# Patient Record
Sex: FEMALE | Race: WHITE | NOT HISPANIC OR LATINO | Employment: FULL TIME | URBAN - METROPOLITAN AREA
[De-identification: names, ages, dates, MRNs, and addresses within clinical notes are randomized per-mention and may not be internally consistent; named-entity substitution may affect disease eponyms.]

---

## 2017-02-15 ENCOUNTER — APPOINTMENT (OUTPATIENT)
Dept: NEUROLOGY | Facility: MEDICAL CENTER | Age: 22
End: 2017-02-15
Payer: COMMERCIAL

## 2017-04-19 ENCOUNTER — OFFICE VISIT (OUTPATIENT)
Dept: NEUROLOGY | Facility: MEDICAL CENTER | Age: 22
End: 2017-04-19
Payer: COMMERCIAL

## 2017-04-19 VITALS
OXYGEN SATURATION: 97 % | HEIGHT: 63 IN | SYSTOLIC BLOOD PRESSURE: 104 MMHG | HEART RATE: 64 BPM | TEMPERATURE: 98.9 F | DIASTOLIC BLOOD PRESSURE: 62 MMHG | BODY MASS INDEX: 21.79 KG/M2 | WEIGHT: 123 LBS | RESPIRATION RATE: 16 BRPM

## 2017-04-19 DIAGNOSIS — G43.019 INTRACTABLE MIGRAINE WITHOUT AURA AND WITHOUT STATUS MIGRAINOSUS: ICD-10-CM

## 2017-04-19 PROCEDURE — 99213 OFFICE O/P EST LOW 20 MIN: CPT | Performed by: PSYCHIATRY & NEUROLOGY

## 2017-04-19 RX ORDER — SUMATRIPTAN AND NAPROXEN SODIUM 85; 500 MG/1; MG/1
TABLET, FILM COATED ORAL
Qty: 9 TAB | Refills: 6 | Status: SHIPPED | OUTPATIENT
Start: 2017-04-19 | End: 2018-05-17

## 2017-04-19 RX ORDER — PROPRANOLOL HCL 60 MG
120 CAPSULE, EXTENDED RELEASE 24HR ORAL DAILY
Qty: 60 CAP | Refills: 6 | Status: SHIPPED | OUTPATIENT
Start: 2017-04-19 | End: 2018-05-17 | Stop reason: SDUPTHER

## 2017-04-19 RX ORDER — PROPRANOLOL HYDROCHLORIDE 10 MG/1
10 TABLET ORAL 3 TIMES DAILY
COMMUNITY
End: 2017-04-19 | Stop reason: CLARIF

## 2017-04-19 ASSESSMENT — ENCOUNTER SYMPTOMS
LOSS OF CONSCIOUSNESS: 0
NAUSEA: 1
HEADACHES: 1
DEPRESSION: 0
MEMORY LOSS: 0

## 2017-04-19 NOTE — MR AVS SNAPSHOT
"        Brenda Priest McUbaldo   2017 4:40 PM   Office Visit   MRN: [id number]    Department:  [facility]   Dept Phone:  [phone]    Description:  Female : 1995   Provider:  Juni Velásquez M.D.           Reason for Visit     Follow-Up Migraines      Allergies as of 2017     Allergen Noted Reactions    Ciprofloxacin 2013   Vomiting    Codeine 10/17/2011   Hives    Doxycycline 2012   Itching, Vomiting    Keflex 2012   Itching      You were diagnosed with     Intractable migraine without aura and without status migrainosus   [[id number]]         Vital Signs     Blood Pressure Pulse Temperature Respirations Height Weight    104/62 mmHg 64 37.2 °C (98.9 °F) 16 1.6 m (5' 3\") 55.792 kg (123 lb)    Body Mass Index Oxygen Saturation Smoking Status             21.79 kg/m2 97% Never Smoker          Basic Information     Date Of Birth Sex Race Ethnicity Preferred Language    1995 Female White Non- English      Problem List              ICD-10-CM Priority Class Noted - Resolved    Asthma, exercise induced J45.990   Unknown - Present    Chronic cholecystitis K81.1   3/12/2015 - Present    Intractable migraine without aura and without status migrainosus G43.019   2017 - Present      Health Maintenance        Date Due Completion Dates    IMM HEP B VACCINE (1 of 3 - Primary Series) 1994 ---    IMM HEP A VACCINE (1 of 2 - Standard Series) 1995 ---    IMM HPV VACCINE (1 of 3 - Female 3 Dose Series) 2005 ---    IMM VARICELLA (CHICKENPOX) VACCINE (1 of 2 - 2 Dose Adolescent Series) 2007 ---    IMM DTaP/Tdap/Td Vaccine (1 - Tdap) 2013 ---    PAP SMEAR 2015 ---            Current Immunizations     MMR/Varicella Combined Vaccine  Incomplete    Tdap Vaccine  Incomplete      Below and/or attached are the medications your provider expects you to take. Review all of your home medications and newly ordered medications with your provider and/or " pharmacist. Follow medication instructions as directed by your provider and/or pharmacist. Please keep your medication list with you and share with your provider. Update the information when medications are discontinued, doses are changed, or new medications (including over-the-counter products) are added; and carry medication information at all times in the event of emergency situations     Allergies:  CIPROFLOXACIN - Vomiting     CODEINE - Hives     DOXYCYCLINE - Itching,Vomiting     KEFLEX - Itching               Medications  Valid as of: April 19, 2017 -  5:10 PM    Generic Name Brand Name Tablet Size Instructions for use    Hydrocodone-Acetaminophen (Tab) NORCO 5-325 MG Take 1-2 Tabs by mouth every 6 hours as needed (pain).        Nitrofurantoin Monohyd Macro (Cap) MACROBID 100 MG Take 1 Cap by mouth 2 times a day.        NON SPECIFIED   Birth control begins w 'z'        Ondansetron (TABLET DISPERSIBLE) ZOFRAN ODT 4 MG Take 1 Tab by mouth every 8 hours as needed.        Propranolol HCl (CAPSULE SR 24 HR) INDERAL LA 60 MG Take 2 Caps by mouth every day.        Sertraline HCl (Tab) ZOLOFT 100 MG Take 100 mg by mouth every day.        Sumatriptan-Naproxen Sodium (Tab) Sumatriptan-Naproxen Sodium  MG 1 tab at headache onset; repeat in 1 hour prn        .                 Medicines prescribed today were sent to:     [facility] [address] [place] [address] AT [address] [address]    Phone: [phone] Fax: [phone]    Open 24 Hours?: No      Medication refill instructions:       If your prescription bottle indicates you have medication refills left, it is not necessary to call your provider’s office. Please contact your pharmacy and they will refill your medication.    If your prescription bottle indicates you do not have any refills left, you may request refills at any time through one of the following ways: The online [employer] system  (except Urgent Care), by calling your provider’s office, or by asking your pharmacy to contact your provider’s office with a refill request. Medication refills are processed only during regular business hours and may not be available until the next business day. Your provider may request additional information or to have a follow-up visit with you prior to refilling your medication.   *Please Note: Medication refills are assigned a new Rx number when refilled electronically. Your pharmacy may indicate that no refills were authorized even though a new prescription for the same medication is available at the pharmacy. Please request the medicine by name with the pharmacy before contacting your provider for a refill.           [employer] Access Code: Activation code not generated  Current Identiv Status: Active

## 2017-04-20 NOTE — PROGRESS NOTES
"Subjective:      Brenda Westbrook is a 22 y.o. female who presents for follow-up with a history of intractable migraine without aura.     HPI    Since last seen, she states that the headaches had responded nicely to Inderal LA 60 mg daily, this after she failed a trial of Zonegran. She was tolerating the drug well. Over the last month or 2, she began to notice the headaches were occurring, not necessarily more often, still with a 5-6 day duration and on a monthly basis, it is simply the intensity has increased noticeably. They start over and around the left eye, now moving over the central forehead. The intensity and associated symptoms are more prominent. Treximet was being used with good benefit, she did not fill her prescription, now uses Motrin only. Needless to say, she is suffering for days at a time until the headache spontaneously remits.    Medical, surgical and family history is reviewed, there are no known drug allergies. She is on Inderal LA 60 mg daily, has Treximet to use available, Motrin 600 mg when necessary, Zofran ODT 4 mg when necessary, and she has stopped the Zoloft for the postpartum depression.    Review of Systems   Constitutional: Positive for malaise/fatigue.   Gastrointestinal: Positive for nausea.   Neurological: Positive for headaches. Negative for loss of consciousness.   Psychiatric/Behavioral: Negative for depression and memory loss.   All other systems reviewed and are negative.       Objective:     /62 mmHg  Pulse 64  Temp(Src) 37.2 °C (98.9 °F)  Resp 16  Ht 1.6 m (5' 3\")  Wt 55.792 kg (123 lb)  BMI 21.79 kg/m2  SpO2 97%     Physical Exam    She appears in no acute distress. Well-developed and well-nourished, she is cooperative. Vital signs are stable. There is some tenderness over the left supraorbital ridge and forehead, mild tenderness bilaterally over the cervical paraspinal muscle bodies. Range of motion of the cervical spine is full. Carotid pulses are " present without asymmetry. Cardiac evaluation is unremarkable. Including mental status, cranial nerves, motor, reflex, coordination, and sensory evaluations, the complete neurologic examination was done and reveals no evidence of deficits or toxicities.     Assessment/Plan:     1. Intractable migraine without aura and without status migrainosus  I will increase Inderal LA to 120 mg daily. I suspect a dose response will still be seen, but why the headaches have simply increased in severity is still a question. Beta blockers can lose efficacy/develop tachyphylaxis, hopefully this is not the case. Treximet will be renewed as well, she was told to stop low-dose ibuprofen. Follow-up in about 4-5 months otherwise, phone contact in the interim if the Inderal is not well tolerated (side effects were reviewed), or if there is no additional benefit.    - propranolol LA (INDERAL LA) 60 MG CAPSULE SR 24 HR; Take 2 Caps by mouth every day.  Dispense: 60 Cap; Refill: 6  - Sumatriptan-Naproxen Sodium (TREXIMET)  MG Tab; 1 tab at headache onset; repeat in 1 hour prn  Dispense: 9 Tab; Refill: 6    Time: Evaluation 20 minutes for exam, review, discussion, and education  Discussion: As mentioned in the assessment, over 50% of time spent face-to-face counseling and coordinating care

## 2017-05-10 ENCOUNTER — APPOINTMENT (OUTPATIENT)
Dept: RADIOLOGY | Facility: MEDICAL CENTER | Age: 22
End: 2017-05-10
Attending: EMERGENCY MEDICINE
Payer: COMMERCIAL

## 2017-05-10 ENCOUNTER — HOSPITAL ENCOUNTER (EMERGENCY)
Facility: MEDICAL CENTER | Age: 22
End: 2017-05-10
Attending: EMERGENCY MEDICINE
Payer: COMMERCIAL

## 2017-05-10 VITALS
BODY MASS INDEX: 22.68 KG/M2 | RESPIRATION RATE: 16 BRPM | SYSTOLIC BLOOD PRESSURE: 129 MMHG | HEIGHT: 63 IN | OXYGEN SATURATION: 100 % | TEMPERATURE: 98.9 F | HEART RATE: 71 BPM | WEIGHT: 128 LBS | DIASTOLIC BLOOD PRESSURE: 72 MMHG

## 2017-05-10 DIAGNOSIS — S46.912A SHOULDER STRAIN, LEFT, INITIAL ENCOUNTER: ICD-10-CM

## 2017-05-10 PROCEDURE — 99284 EMERGENCY DEPT VISIT MOD MDM: CPT

## 2017-05-10 PROCEDURE — 307740 HCHG GREEN TRAUMA TEAM SERVICES

## 2017-05-10 PROCEDURE — 73030 X-RAY EXAM OF SHOULDER: CPT | Mod: LT

## 2017-05-10 PROCEDURE — 72040 X-RAY EXAM NECK SPINE 2-3 VW: CPT
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
## 2017-05-10 NOTE — ED AVS SNAPSHOT
5/10/2017    Brenda Gaviria  [address]  [facility] [address]    Dear Brenda:    [facility] wants to ensure your discharge home is safe and you or your loved ones have had all of your questions answered regarding your care after you leave the hospital.    Below is a list of resources and contact information should you have any questions regarding your hospital stay, follow-up instructions, or active medical symptoms.    Questions or Concerns Regarding… Contact   Medical Questions Related to Your Discharge  (7 days a week, 8am-5pm) Contact a Nurse Care Coordinator   [phone]   Medical Questions Not Related to Your Discharge  (24 hours a day / 7 days a week)  Contact the Nurse Health Line   [phone]    Medications or Discharge Instructions Refer to your discharge packet   or contact your [facility] Primary Care Provider   [phone]   Follow-up Appointment(s) Schedule your appointment via edenes   or contact Scheduling [phone]   Billing Review your statement via edenes  or contact Billing [phone]   Medical Records Review your records via edenes   or contact Medical Records [phone]     You may receive a telephone call within two days of discharge. This call is to make certain you understand your discharge instructions and have the opportunity to have any questions answered. You can also easily access your medical information, test results and upcoming appointments via the edenes free online health management tool. You can learn more and sign up at [employer]/edenes. For assistance setting up your edenes account, please call [phone].    Once again, we want to ensure your discharge home is safe and that you have a clear understanding of any next steps in your care. If you have any questions or concerns, please do not hesitate to contact us, we are here for you. Thank you for choosing [facility] for your healthcare needs.    Sincerely,    Your [facility] Healthcare Team

## 2017-05-10 NOTE — ED AVS SNAPSHOT
Weimob Access Code: [email or web address]  Expires: 6/9/2017 12:04 PM    Weimob  A secure, online tool to manage your health information     [employer]’s Weimob® is a secure, online tool that connects you to your personalized health information from the privacy of your home -- day or night - making it very easy for you to manage your healthcare. Once the activation process is completed, you can even access your medical information using the Weimob lydia, which is available for free in the Apple Lydia store or Google Play store.     Weimob provides the following levels of access (as shown below):   My Chart Features   [facility] Primary Care Doctor [facility]  Specialists [facility]  Urgent  Care Non-[facility]  Primary Care  Doctor   Email your healthcare team securely and privately 24/7 X X X X   Manage appointments: schedule your next appointment; view details of past/upcoming appointments X      Request prescription refills. X      View recent personal medical records, including lab and immunizations X X X X   View health record, including health history, allergies, medications X X X X   Read reports about your outpatient visits, procedures, consult and ER notes X X X X   See your discharge summary, which is a recap of your hospital and/or ER visit that includes your diagnosis, lab results, and care plan. X X       How to register for Weimob:  1. Go to  [email or web address].  2. Click on the Sign Up Now box, which takes you to the New Member Sign Up page. You will need to provide the following information:  a. Enter your Weimob Access Code exactly as it appears at the top of this page. (You will not need to use this code after you’ve completed the sign-up process. If you do not sign up before the expiration date, you must request a new code.)   b. Enter your date of birth.   c. Enter your home email address.   d. Click Submit, and follow the next screen’s instructions.  3. Create a Weimob ID. This will be your Weimob  login ID and cannot be changed, so think of one that is secure and easy to remember.  4. Create a [employer] password. You can change your password at any time.  5. Enter your Password Reset Question and Answer. This can be used at a later time if you forget your password.   6. Enter your e-mail address. This allows you to receive e-mail notifications when new information is available in GamePix.  7. Click Sign Up. You can now view your health information.    For assistance activating your GamePix account, call [phone]
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
## 2017-05-10 NOTE — DISCHARGE INSTRUCTIONS
Shoulder Pain  The shoulder is the joint that connects your arm to your body. Muscles and band-like tissues that connect bones to muscles (tendons) hold the joint together. Shoulder pain is felt if an injury or medical problem affects one or more parts of the shoulder.  HOME CARE   · Put ice on the sore area.  ¨ Put ice in a plastic bag.  ¨ Place a towel between your skin and the bag.  ¨ Leave the ice on for 15-20 minutes, 03-04 times a day for the first 2 days.  · Stop using cold packs if they do not help with the pain.  · If you were given something to keep your shoulder from moving (sling; shoulder immobilizer), wear it as told. Only take it off to shower or bathe.  · Move your arm as little as possible, but keep your hand moving to prevent puffiness (swelling).  · Squeeze a soft ball or foam pad as much as possible to help prevent swelling.  · Take medicine as told by your doctor.  GET HELP IF:  · You have progressing new pain in your arm, hand, or fingers.  · Your hand or fingers get cold.  · Your medicine does not help lessen your pain.  GET HELP RIGHT AWAY IF:   · Your arm, hand, or fingers are numb or tingling.  · Your arm, hand, or fingers are puffy (swollen), painful, or turn white or blue.  MAKE SURE YOU:   · Understand these instructions.  · Will watch your condition.  · Will get help right away if you are not doing well or get worse.     This information is not intended to replace advice given to you by your health care provider. Make sure you discuss any questions you have with your health care provider.     Document Released: 06/05/2009 Document Revised: 01/08/2016 Document Reviewed: 04/11/2016  [employer] Interactive Patient Education ©2016 [employer]

## 2017-05-10 NOTE — ED AVS SNAPSHOT
Home Care Instructions                                                                                                                Brenda Gaviria   MRN: [id number]    Department:  [facility], Emergency Dept   Date of Visit:  5/10/2017            [facility], Emergency Dept    [address]    [place] [address]    Phone:  [phone]      You were seen by     Arslan Dumas M.D.      Your Diagnosis Was     Shoulder strain, left, initial encounter     S46.912A       Follow-up Information     1. Follow up with your primary care doctor In 2 weeks.    Why:  As needed      Medication Information     Review all of your home medications and newly ordered medications with your primary doctor and/or pharmacist as soon as possible. Follow medication instructions as directed by your doctor and/or pharmacist.     Please keep your complete medication list with you and share with your physician. Update the information when medications are discontinued, doses are changed, or new medications (including over-the-counter products) are added; and carry medication information at all times in the event of emergency situations.               Medication List      Notice     You have not been prescribed any medications.            Procedures and tests performed during your visit     DX-CERVICAL SPINE-2 OR 3 VIEWS    DX-SHOULDER 2+ LEFT    SLING        Discharge Instructions       Shoulder Pain  The shoulder is the joint that connects your arm to your body. Muscles and band-like tissues that connect bones to muscles (tendons) hold the joint together. Shoulder pain is felt if an injury or medical problem affects one or more parts of the shoulder.  HOME CARE   · Put ice on the sore area.  ¨ Put ice in a plastic bag.  ¨ Place a towel between your skin and the bag.  ¨ Leave the ice on for 15-20 minutes, 03-04 times a day for the first 2 days.  · Stop using cold packs if they do not help with the  pain.  · If you were given something to keep your shoulder from moving (sling; shoulder immobilizer), wear it as told. Only take it off to shower or bathe.  · Move your arm as little as possible, but keep your hand moving to prevent puffiness (swelling).  · Squeeze a soft ball or foam pad as much as possible to help prevent swelling.  · Take medicine as told by your doctor.  GET HELP IF:  · You have progressing new pain in your arm, hand, or fingers.  · Your hand or fingers get cold.  · Your medicine does not help lessen your pain.  GET HELP RIGHT AWAY IF:   · Your arm, hand, or fingers are numb or tingling.  · Your arm, hand, or fingers are puffy (swollen), painful, or turn white or blue.  MAKE SURE YOU:   · Understand these instructions.  · Will watch your condition.  · Will get help right away if you are not doing well or get worse.     This information is not intended to replace advice given to you by your health care provider. Make sure you discuss any questions you have with your health care provider.     Document Released: 06/05/2009 Document Revised: 01/08/2016 Document Reviewed: 04/11/2016  "[employer] Interactive Patient Education ©2016 "[employer] Inc.            Patient Information     Patient Information    Following emergency treatment: all patient requiring follow-up care must return either to a private physician or a clinic if your condition worsens before you are able to obtain further medical attention, please return to the emergency room.     Billing Information    At [facility], we work to make the billing process streamlined for our patients.  Our Representatives are here to answer any questions you may have regarding your hospital bill.  If you have insurance coverage and have supplied your insurance information to us, we will submit a claim to your insurer on your behalf.  Should you have any questions regarding your bill, we can be reached online or by phone as follows:  Online: You are able pay  your bills online or live chat with our representatives about any billing questions you may have. We are here to help Monday - Friday from 8:00am to 7:30pm and 9:00am - 12:00pm on Saturdays.  Please visit [email or web address]  for more information.   Phone:  [phone] or [phone]    Please note that your emergency physician, surgeon, pathologist, radiologist, anesthesiologist, and other specialists are not employed by [facility] and will therefore bill separately for their services.  Please contact them directly for any questions concerning their bills at the numbers below:     Emergency Physician Services:  [phone]  [facility]:  [phone]  [facility]:  [phone]  [facility]:  [phone]    1. Your final bill may vary from the amount quoted upon discharge if all procedures are not complete at that time, or if your doctor has additional procedures of which we are not aware. You will receive an additional bill if you return to the Emergency Department at [facility] for suture removal regardless of the facility of which the sutures were placed.     2. Please arrange for settlement of this account at the emergency registration.    3. All self-pay accounts are due in full at the time of treatment.  If you are unable to meet this obligation then payment is expected within 4-5 days.     4. If you have had radiology studies (CT, X-ray, Ultrasound, MRI), you have received a preliminary result during your emergency department visit. Please contact the radiology department [phone] to receive a copy of your final result. Please discuss the Final result with your primary physician or with the follow up physician provided.     Crisis Hotline:  [place] Crisis Hotline:  9-187-KVDJRAZ or 1-797.432.6345  [place] Crisis Hotline:    1-364.694.1822 or 765-669-0410         ED Discharge Follow Up Questions    1. In order to  provide you with very good care, we would like to follow up with a phone call in the next few days.  May we have your permission to contact you?     YES /  NO    2. What is the best phone number to call you? (       )_____-__________    3. What is the best time to call you?      Morning  /  Afternoon  /  Evening                   Patient Signature:  ____________________________________________________________    Date:  ____________________________________________________________

## 2017-07-19 ENCOUNTER — APPOINTMENT (OUTPATIENT)
Dept: PHYSICAL THERAPY | Facility: REHABILITATION | Age: 22
End: 2017-07-19
Attending: FAMILY MEDICINE
Payer: COMMERCIAL

## 2017-07-24 ENCOUNTER — APPOINTMENT (OUTPATIENT)
Dept: PHYSICAL THERAPY | Facility: REHABILITATION | Age: 22
End: 2017-07-24
Attending: FAMILY MEDICINE
Payer: COMMERCIAL

## 2017-07-26 ENCOUNTER — APPOINTMENT (OUTPATIENT)
Dept: PHYSICAL THERAPY | Facility: REHABILITATION | Age: 22
End: 2017-07-26
Attending: FAMILY MEDICINE
Payer: COMMERCIAL

## 2017-07-27 ENCOUNTER — APPOINTMENT (OUTPATIENT)
Dept: PHYSICAL THERAPY | Facility: REHABILITATION | Age: 22
End: 2017-07-27
Attending: FAMILY MEDICINE
Payer: COMMERCIAL

## 2017-07-31 ENCOUNTER — APPOINTMENT (OUTPATIENT)
Dept: PHYSICAL THERAPY | Facility: REHABILITATION | Age: 22
End: 2017-07-31
Attending: FAMILY MEDICINE
Payer: COMMERCIAL

## 2017-08-01 ENCOUNTER — APPOINTMENT (OUTPATIENT)
Dept: PHYSICAL THERAPY | Facility: REHABILITATION | Age: 22
End: 2017-08-01
Attending: FAMILY MEDICINE
Payer: COMMERCIAL

## 2017-08-02 ENCOUNTER — APPOINTMENT (OUTPATIENT)
Dept: PHYSICAL THERAPY | Facility: REHABILITATION | Age: 22
End: 2017-08-02
Attending: FAMILY MEDICINE
Payer: COMMERCIAL

## 2017-08-03 ENCOUNTER — APPOINTMENT (OUTPATIENT)
Dept: PHYSICAL THERAPY | Facility: REHABILITATION | Age: 22
End: 2017-08-03
Attending: FAMILY MEDICINE
Payer: COMMERCIAL

## 2017-08-07 ENCOUNTER — APPOINTMENT (OUTPATIENT)
Dept: PHYSICAL THERAPY | Facility: REHABILITATION | Age: 22
End: 2017-08-07
Attending: FAMILY MEDICINE
Payer: COMMERCIAL

## 2017-08-08 ENCOUNTER — APPOINTMENT (OUTPATIENT)
Dept: PHYSICAL THERAPY | Facility: REHABILITATION | Age: 22
End: 2017-08-08
Attending: FAMILY MEDICINE
Payer: COMMERCIAL

## 2017-08-09 ENCOUNTER — APPOINTMENT (OUTPATIENT)
Dept: PHYSICAL THERAPY | Facility: REHABILITATION | Age: 22
End: 2017-08-09
Attending: FAMILY MEDICINE
Payer: COMMERCIAL

## 2017-08-14 ENCOUNTER — APPOINTMENT (OUTPATIENT)
Dept: PHYSICAL THERAPY | Facility: REHABILITATION | Age: 22
End: 2017-08-14
Attending: FAMILY MEDICINE
Payer: COMMERCIAL

## 2017-08-22 ENCOUNTER — APPOINTMENT (OUTPATIENT)
Dept: NEUROLOGY | Facility: MEDICAL CENTER | Age: 22
End: 2017-08-22
Payer: COMMERCIAL

## 2017-11-30 ENCOUNTER — APPOINTMENT (OUTPATIENT)
Dept: RADIOLOGY | Facility: MEDICAL CENTER | Age: 22
End: 2017-11-30
Attending: EMERGENCY MEDICINE
Payer: COMMERCIAL

## 2017-11-30 ENCOUNTER — HOSPITAL ENCOUNTER (EMERGENCY)
Facility: MEDICAL CENTER | Age: 22
End: 2017-11-30
Attending: EMERGENCY MEDICINE
Payer: COMMERCIAL

## 2017-11-30 VITALS
HEIGHT: 63 IN | TEMPERATURE: 98.3 F | DIASTOLIC BLOOD PRESSURE: 74 MMHG | BODY MASS INDEX: 22.32 KG/M2 | HEART RATE: 100 BPM | RESPIRATION RATE: 16 BRPM | OXYGEN SATURATION: 99 % | WEIGHT: 126 LBS | SYSTOLIC BLOOD PRESSURE: 136 MMHG

## 2017-11-30 DIAGNOSIS — S16.1XXA STRAIN OF NECK MUSCLE, INITIAL ENCOUNTER: ICD-10-CM

## 2017-11-30 DIAGNOSIS — M79.604 RIGHT LEG PAIN: ICD-10-CM
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
## 2017-11-30 LAB — HCG UR QL: NEGATIVE

## 2017-11-30 PROCEDURE — 72125 CT NECK SPINE W/O DYE: CPT

## 2017-11-30 PROCEDURE — 700102 HCHG RX REV CODE 250 W/ 637 OVERRIDE(OP): Performed by: EMERGENCY MEDICINE

## 2017-11-30 PROCEDURE — 73562 X-RAY EXAM OF KNEE 3: CPT | Mod: RT

## 2017-11-30 PROCEDURE — 81025 URINE PREGNANCY TEST: CPT

## 2017-11-30 PROCEDURE — 99284 EMERGENCY DEPT VISIT MOD MDM: CPT

## 2017-11-30 PROCEDURE — A9270 NON-COVERED ITEM OR SERVICE: HCPCS | Performed by: EMERGENCY MEDICINE

## 2017-11-30 RX ORDER — ACETAMINOPHEN 325 MG/1
650 TABLET ORAL ONCE
Status: COMPLETED | OUTPATIENT
Start: 2017-11-30 | End: 2017-11-30

## 2017-11-30 RX ORDER — HYDROCODONE BITARTRATE AND ACETAMINOPHEN 5; 325 MG/1; MG/1
1-2 TABLET ORAL EVERY 6 HOURS PRN
Qty: 15 TAB | Refills: 0 | Status: SHIPPED | OUTPATIENT
Start: 2017-11-30 | End: 2018-03-08

## 2017-11-30 RX ADMIN — ACETAMINOPHEN 650 MG: 325 TABLET, FILM COATED ORAL at 10:10

## 2017-11-30 ASSESSMENT — ENCOUNTER SYMPTOMS
NECK PAIN: 1
LOSS OF CONSCIOUSNESS: 0

## 2017-11-30 ASSESSMENT — PAIN SCALES - GENERAL: PAINLEVEL_OUTOF10: 8
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
## 2017-11-30 NOTE — ED NOTES
Patient given d/c instructions, crutches, and prescription. Verbalized understanding. AAOx4, VSS d/c'd home.

## 2017-11-30 NOTE — DISCHARGE INSTRUCTIONS
Cervical Sprain  Return to the ER if any worsening of your pain. Contact her doctor for follow-up next week  A cervical sprain is an injury in the neck in which the strong, fibrous tissues (ligaments) that connect your neck bones stretch or tear. Cervical sprains can range from mild to severe. Severe cervical sprains can cause the neck vertebrae to be unstable. This can lead to damage of the spinal cord and can result in serious nervous system problems. The amount of time it takes for a cervical sprain to get better depends on the cause and extent of the injury. Most cervical sprains heal in 1 to 3 weeks.  CAUSES   Severe cervical sprains may be caused by:   · Contact sport injuries (such as from football, rugby, wrestling, hockey, auto racing, gymnastics, diving, martial arts, or boxing).    · Motor vehicle collisions.    · Whiplash injuries. This is an injury from a sudden forward and backward whipping movement of the head and neck.   · Falls.    Mild cervical sprains may be caused by:   · Being in an awkward position, such as while cradling a telephone between your ear and shoulder.    · Sitting in a chair that does not offer proper support.    · Working at a poorly designed computer station.    · Looking up or down for long periods of time.    SYMPTOMS   · Pain, soreness, stiffness, or a burning sensation in the front, back, or sides of the neck. This discomfort may develop immediately after the injury or slowly, 24 hours or more after the injury.    · Pain or tenderness directly in the middle of the back of the neck.    · Shoulder or upper back pain.    · Limited ability to move the neck.    · Headache.    · Dizziness.    · Weakness, numbness, or tingling in the hands or arms.    · Muscle spasms.    · Difficulty swallowing or chewing.    · Tenderness and swelling of the neck.    DIAGNOSIS   Most of the time your health care provider can diagnose a cervical sprain by taking your history and doing a physical  exam. Your health care provider will ask about previous neck injuries and any known neck problems, such as arthritis in the neck. X-rays may be taken to find out if there are any other problems, such as with the bones of the neck. Other tests, such as a CT scan or MRI, may also be needed.   TREATMENT   Treatment depends on the severity of the cervical sprain. Mild sprains can be treated with rest, keeping the neck in place (immobilization), and pain medicines. Severe cervical sprains are immediately immobilized. Further treatment is done to help with pain, muscle spasms, and other symptoms and may include:  · Medicines, such as pain relievers, numbing medicines, or muscle relaxants.    · Physical therapy. This may involve stretching exercises, strengthening exercises, and posture training. Exercises and improved posture can help stabilize the neck, strengthen muscles, and help stop symptoms from returning.    HOME CARE INSTRUCTIONS   · Put ice on the injured area.    ¨ Put ice in a plastic bag.    ¨ Place a towel between your skin and the bag.    ¨ Leave the ice on for 15-20 minutes, 3-4 times a day.    · If your injury was severe, you may have been given a cervical collar to wear. A cervical collar is a two-piece collar designed to keep your neck from moving while it heals.  ¨ Do not remove the collar unless instructed by your health care provider.  ¨ If you have long hair, keep it outside of the collar.  ¨ Ask your health care provider before making any adjustments to your collar. Minor adjustments may be required over time to improve comfort and reduce pressure on your chin or on the back of your head.  ¨ If you are allowed to remove the collar for cleaning or bathing, follow your health care provider's instructions on how to do so safely.  ¨ Keep your collar clean by wiping it with mild soap and water and drying it completely. If the collar you have been given includes removable pads, remove them every 1-2 days  and hand wash them with soap and water. Allow them to air dry. They should be completely dry before you wear them in the collar.  ¨ If you are allowed to remove the collar for cleaning and bathing, wash and dry the skin of your neck. Check your skin for irritation or sores. If you see any, tell your health care provider.  ¨ Do not drive while wearing the collar.    · Only take over-the-counter or prescription medicines for pain, discomfort, or fever as directed by your health care provider.    · Keep all follow-up appointments as directed by your health care provider.    · Keep all physical therapy appointments as directed by your health care provider.    · Make any needed adjustments to your workstation to promote good posture.    · Avoid positions and activities that make your symptoms worse.    · Warm up and stretch before being active to help prevent problems.    SEEK MEDICAL CARE IF:   · Your pain is not controlled with medicine.    · You are unable to decrease your pain medicine over time as planned.    · Your activity level is not improving as expected.    SEEK IMMEDIATE MEDICAL CARE IF:   · You develop any bleeding.  · You develop stomach upset.  · You have signs of an allergic reaction to your medicine.    · Your symptoms get worse.    · You develop new, unexplained symptoms.    · You have numbness, tingling, weakness, or paralysis in any part of your body.    MAKE SURE YOU:   · Understand these instructions.  · Will watch your condition.  · Will get help right away if you are not doing well or get worse.     This information is not intended to replace advice given to you by your health care provider. Make sure you discuss any questions you have with your health care provider.     Document Released: 10/14/2008 Document Revised: 12/23/2014 Document Reviewed: 06/25/2014  [employer] Interactive Patient Education ©2016 [employer]  Motor Vehicle Collision  It is common to have multiple bruises and sore muscles  after a motor vehicle collision (MVC). These tend to feel worse for the first 24 hours. You may have the most stiffness and soreness over the first several hours. You may also feel worse when you wake up the first morning after your collision. After this point, you will usually begin to improve with each day. The speed of improvement often depends on the severity of the collision, the number of injuries, and the location and nature of these injuries.  HOME CARE INSTRUCTIONS  · Put ice on the injured area.  ¨ Put ice in a plastic bag.  ¨ Place a towel between your skin and the bag.  ¨ Leave the ice on for 15-20 minutes, 3-4 times a day, or as directed by your health care provider.  · Drink enough fluids to keep your urine clear or pale yellow. Do not drink alcohol.  · Take a warm shower or bath once or twice a day. This will increase blood flow to sore muscles.  · You may return to activities as directed by your caregiver. Be careful when lifting, as this may aggravate neck or back pain.  · Only take over-the-counter or prescription medicines for pain, discomfort, or fever as directed by your caregiver. Do not use aspirin. This may increase bruising and bleeding.  SEEK IMMEDIATE MEDICAL CARE IF:  · You have numbness, tingling, or weakness in the arms or legs.  · You develop severe headaches not relieved with medicine.  · You have severe neck pain, especially tenderness in the middle of the back of your neck.  · You have changes in bowel or bladder control.  · There is increasing pain in any area of the body.  · You have shortness of breath, light-headedness, dizziness, or fainting.  · You have chest pain.  · You feel sick to your stomach (nauseous), throw up (vomit), or sweat.  · You have increasing abdominal discomfort.  · There is blood in your urine, stool, or vomit.  · You have pain in your shoulder (shoulder strap areas).  · You feel your symptoms are getting worse.  MAKE SURE YOU:  · Understand these  instructions.  · Will watch your condition.  · Will get help right away if you are not doing well or get worse.     This information is not intended to replace advice given to you by your health care provider. Make sure you discuss any questions you have with your health care provider.     Document Released: 12/18/2006 Document Revised: 01/08/2016 Document Reviewed: 05/16/2012  [employer] Interactive Patient Education ©2016 [employer]

## 2017-12-01 ENCOUNTER — PATIENT OUTREACH (OUTPATIENT)
Dept: HEALTH INFORMATION MANAGEMENT | Facility: OTHER | Age: 22
End: 2017-12-01

## 2018-01-30 ENCOUNTER — HOSPITAL ENCOUNTER (OUTPATIENT)
Dept: RADIOLOGY | Facility: MEDICAL CENTER | Age: 23
End: 2018-01-30
Attending: OBSTETRICS & GYNECOLOGY
Payer: COMMERCIAL

## 2018-01-30 DIAGNOSIS — R10.31 ABDOMINAL PAIN, RIGHT LOWER QUADRANT: ICD-10-CM

## 2018-01-30 PROCEDURE — 76817 TRANSVAGINAL US OBSTETRIC: CPT

## 2018-02-01 ENCOUNTER — GYNECOLOGY VISIT (OUTPATIENT)
Dept: OBGYN | Facility: CLINIC | Age: 23
End: 2018-02-01
Payer: COMMERCIAL

## 2018-02-01 VITALS — HEIGHT: 63 IN

## 2018-02-01 DIAGNOSIS — Z34.81 PRENATAL CARE, SUBSEQUENT PREGNANCY IN FIRST TRIMESTER: ICD-10-CM

## 2018-02-01 DIAGNOSIS — O09.91 HIGH-RISK PREGNANCY IN FIRST TRIMESTER: ICD-10-CM

## 2018-02-01 PROCEDURE — 59401 PR NEW OB VISIT: CPT | Performed by: OBSTETRICS & GYNECOLOGY

## 2018-02-01 RX ORDER — DOXYLAMINE SUCCINATE AND PYRIDOXINE HYDROCHLORIDE, DELAYED RELEASE TABLETS 10 MG/10 MG 10; 10 MG/1; MG/1
2 TABLET, DELAYED RELEASE ORAL
Qty: 120 TAB | Refills: 3 | Status: SHIPPED | OUTPATIENT
Start: 2018-02-01 | End: 2018-05-17

## 2018-02-01 ASSESSMENT — ENCOUNTER SYMPTOMS
NAUSEA: 1
DEPRESSION: 0
CONSTIPATION: 0
HEARTBURN: 0
COUGH: 0
DIARRHEA: 0
BLURRED VISION: 0
ABDOMINAL PAIN: 1
WEIGHT LOSS: 1
VOMITING: 0
CHILLS: 0
MYALGIAS: 0
PALPITATIONS: 0
DIZZINESS: 0
FEVER: 0

## 2018-02-01 NOTE — PROGRESS NOTES
"Subjective:      Brenda Westbrook is a 23 y.o. female who presents with Other (DUB)            HPI   23-year-old  001 whose last menstrual cycle was 2017. Patient states she is sure of this date. This would make her approximately 7 weeks and 6 days with an SHERRI of . Patient is reporting some nausea but no vomiting does desire treatment at this time. Patient also has a large corpus luteum cyst on the right ovary. The patient is complaining of abdominal pain, worse with movement. Also complaining of abdominal pressure  Patient denies vaginal bleeding    Pap smear, patient denies history of abnormal Pap smear or sexually transmitted diseases    Review of Systems   Constitutional: Positive for weight loss. Negative for chills, fever and malaise/fatigue.   Eyes: Negative for blurred vision.   Respiratory: Negative for cough.    Cardiovascular: Negative for chest pain and palpitations.   Gastrointestinal: Positive for abdominal pain and nausea. Negative for constipation, diarrhea, heartburn and vomiting.   Genitourinary: Negative for dysuria.   Musculoskeletal: Negative for myalgias.   Skin: Negative for rash.   Neurological: Negative for dizziness.   Psychiatric/Behavioral: Negative for depression.          Objective:     Ht 1.6 m (5' 3\")   LMP 2017   Breastfeeding? No      Physical Exam   Constitutional: She is oriented to person, place, and time. She appears well-developed and well-nourished.   HENT:   Head: Normocephalic and atraumatic.   Neck: Normal range of motion. Neck supple.   Cardiovascular: Normal rate and regular rhythm.    Pulmonary/Chest: Effort normal and breath sounds normal.   Abdominal: Soft.   Neurological: She is alert and oriented to person, place, and time.   Skin: Skin is warm and dry.   Nursing note and vitals reviewed.      Remainder of physical exam is deferred, patient states she recently had a Pap GC and chlamydia done at previous providers office  Will " obtain to obtain records may need to repeat later            Assessment/Plan:     1. Prenatal care, subsequent pregnancy in first trimester    - Prenatal MV-Min-Fe Fum-FA-DHA (PRENATAL 1 PO); Take  by mouth.    2. High-risk pregnancy in first trimester  Previous , documented low transverse, discussed  with patient she is to consider options at this time    IUP at 7 weeks and 6 days  Follow-up in 4 weeks  Declined first trimester screening

## 2018-02-16 ENCOUNTER — GYNECOLOGY VISIT (OUTPATIENT)
Dept: OBGYN | Facility: CLINIC | Age: 23
End: 2018-02-16
Payer: COMMERCIAL

## 2018-02-16 ENCOUNTER — HOSPITAL ENCOUNTER (OUTPATIENT)
Facility: MEDICAL CENTER | Age: 23
End: 2018-02-16
Attending: OBSTETRICS & GYNECOLOGY
Payer: COMMERCIAL

## 2018-02-16 DIAGNOSIS — N39.0 URINARY TRACT INFECTION WITHOUT HEMATURIA, SITE UNSPECIFIED: ICD-10-CM

## 2018-02-16 LAB
AMBIGUOUS DTTM AMBI4: NORMAL
APPEARANCE UR: CLEAR
APPEARANCE UR: NORMAL
BACTERIA #/AREA URNS HPF: ABNORMAL /HPF
BILIRUB UR QL STRIP.AUTO: NEGATIVE
BILIRUB UR STRIP-MCNC: NORMAL MG/DL
CAOX CRY #/AREA URNS HPF: ABNORMAL /HPF
COLOR UR AUTO: NORMAL
COLOR UR: YELLOW
CULTURE IF INDICATED INDCX: YES UA CULTURE
EPI CELLS #/AREA URNS HPF: ABNORMAL /HPF
GLUCOSE UR STRIP.AUTO-MCNC: NEGATIVE MG/DL
GLUCOSE UR STRIP.AUTO-MCNC: NORMAL MG/DL
HYALINE CASTS #/AREA URNS LPF: ABNORMAL /LPF
KETONES UR STRIP.AUTO-MCNC: ABNORMAL MG/DL
KETONES UR STRIP.AUTO-MCNC: NORMAL MG/DL
LEUKOCYTE ESTERASE UR QL STRIP.AUTO: ABNORMAL
LEUKOCYTE ESTERASE UR QL STRIP.AUTO: NORMAL
MICRO URNS: ABNORMAL
NITRITE UR QL STRIP.AUTO: NEGATIVE
NITRITE UR QL STRIP.AUTO: NORMAL
PH UR STRIP.AUTO: 5.5 [PH]
PH UR STRIP.AUTO: 6 [PH] (ref 5–8)
PROT UR QL STRIP: NEGATIVE MG/DL
PROT UR QL STRIP: NORMAL MG/DL
RBC # URNS HPF: ABNORMAL /HPF
RBC UR QL AUTO: NEGATIVE
RBC UR QL AUTO: NORMAL
SP GR UR STRIP.AUTO: 1.02
SP GR UR STRIP.AUTO: 1.02
UROBILINOGEN UR STRIP-MCNC: NORMAL MG/DL
UROBILINOGEN UR STRIP.AUTO-MCNC: 1 MG/DL
WBC #/AREA URNS HPF: ABNORMAL /HPF

## 2018-02-16 PROCEDURE — 87086 URINE CULTURE/COLONY COUNT: CPT

## 2018-02-16 PROCEDURE — 81001 URINALYSIS AUTO W/SCOPE: CPT

## 2018-02-16 PROCEDURE — 81002 URINALYSIS NONAUTO W/O SCOPE: CPT | Performed by: OBSTETRICS & GYNECOLOGY

## 2018-02-18 LAB
BACTERIA UR CULT: NORMAL
SIGNIFICANT IND 70042: NORMAL
SITE SITE: NORMAL
SOURCE SOURCE: NORMAL

## 2018-03-08 ENCOUNTER — ROUTINE PRENATAL (OUTPATIENT)
Dept: OBGYN | Facility: CLINIC | Age: 23
End: 2018-03-08
Payer: COMMERCIAL

## 2018-03-08 ENCOUNTER — HOSPITAL ENCOUNTER (OUTPATIENT)
Facility: MEDICAL CENTER | Age: 23
End: 2018-03-08
Attending: OBSTETRICS & GYNECOLOGY
Payer: COMMERCIAL

## 2018-03-08 VITALS — SYSTOLIC BLOOD PRESSURE: 122 MMHG | WEIGHT: 132 LBS | DIASTOLIC BLOOD PRESSURE: 82 MMHG | BODY MASS INDEX: 23.38 KG/M2

## 2018-03-08 DIAGNOSIS — Z98.891 HX OF CESAREAN SECTION: ICD-10-CM

## 2018-03-08 DIAGNOSIS — Z12.4 SCREENING FOR MALIGNANT NEOPLASM OF CERVIX: ICD-10-CM

## 2018-03-08 DIAGNOSIS — Z11.3 SCREEN FOR SEXUALLY TRANSMITTED DISEASES: ICD-10-CM

## 2018-03-08 DIAGNOSIS — O09.91 HIGH-RISK PREGNANCY IN FIRST TRIMESTER: ICD-10-CM

## 2018-03-08 DIAGNOSIS — N83.201 RIGHT OVARIAN CYST: ICD-10-CM

## 2018-03-08 DIAGNOSIS — R76.8 BIOLOGICAL FALSE POSITIVE RPR TEST: ICD-10-CM

## 2018-03-08 PROBLEM — N80.9 ENDOMETRIOSIS DETERMINED BY LAPAROSCOPY: Status: ACTIVE | Noted: 2018-03-08

## 2018-03-08 PROCEDURE — 87591 N.GONORRHOEAE DNA AMP PROB: CPT

## 2018-03-08 PROCEDURE — 88175 CYTOPATH C/V AUTO FLUID REDO: CPT

## 2018-03-08 PROCEDURE — 87491 CHLMYD TRACH DNA AMP PROBE: CPT

## 2018-03-08 PROCEDURE — 90040 PR PRENATAL FOLLOW UP: CPT | Performed by: OBSTETRICS & GYNECOLOGY

## 2018-03-08 NOTE — PROGRESS NOTES
Cc: New OB visit    HPI:  The patient is a 23 y.o.  12w6d based upon LMP and 6wk u/s  Patient's last menstrual period was 2017.    She presents for her new obstetric visit.    She denies fetal movement, denies vaginal bleeding, denies leakage of fluid, denies contractions.  Has a rash on her lower legs since got pregnant, using lotion w/ topical steroid which is not helping, itches. Not on other parts of body.       She denies nausea/vomiting, headaches, or urinary symptoms.      OB History    Para Term  AB Living   2 1 1 0 0 1   SAB TAB Ectopic Molar Multiple Live Births   0 0 0     1      # Outcome Date GA Lbr Bhupinder/2nd Weight Sex Delivery Anes PTL Lv   2 Current            1 Term 16 41w0d  3.09 kg (6 lb 13 oz) M CS-Unspec Spinal N MARYSOL        Past Medical History:   Diagnosis Date   • Asthma, exercise induced    • Bronchitis    • Chlamydia     Oct 2011   • Cold     02/6/15   • Endometriosis    • Indigestion    • Migraine without aura, with intractable migraine, so stated, without mention of status migrainosus    • Other specified symptom associated with female genital organs     endometriosis   • Pain     pelvic   • Pain 12    stomach   • Psychiatric problem     depression   • Sleep disturbances    • Unspecified disorder of thyroid 2015    hyperactive in the past not taking any meds currently   • Urinary bladder disorder     frequent uti's     Past Surgical History:   Procedure Laterality Date   • PRIMARY C SECTION Bilateral 2016    Procedure: PRIMARY C SECTION;  Surgeon: Trever Beckman M.D.;  Location: LABOR AND DELIVERY;  Service:    • MANNY BY LAPAROSCOPY  3/12/2015    Procedure: MANNY BY LAPAROSCOPY;  Surgeon: Sean Mitchell M.D.;  Location: [facility];  Service:    • PELVISCOPY     • CYSTOSCOPY  2012    Performed by QUINTIN LOPES at SURGERY [facility]   • PELVISCOPY  2012    Performed by CHRISTIANO PUGH at [facility]   • MYRINGOTOMY     • STRABISMUS REPAIR      gerardo eyes   • TONSILLECTOMY       Social History     Social History   • Marital status:      Spouse name: N/A   • Number of children: N/A   • Years of education: N/A     Occupational History   • Not on file.     Social History Main Topics   • Smoking status: Never Smoker   • Smokeless tobacco: Never Used   • Alcohol use No   • Drug use: No   • Sexual activity: Yes     Partners: Male     Birth control/ protection: Condom     Other Topics Concern   • Not on file     Social History Narrative    ** Merged History Encounter **          Family History   Problem Relation Age of Onset   • Other Mother      Migraine   • Other Brother      Migraine   • Diabetes     • Hypertension     • Stroke     • Cancer     • Heart Disease       Allergies:   Allergies as of 2018 - Reviewed 2018   Allergen Reaction Noted   • Ciprofloxacin Vomiting 2013   • Ciprofloxacin  05/10/2017   • Codeine Hives 10/17/2011   • Codeine  05/10/2017   • Doxycycline Itching and Vomiting 2012   • Doxycycline  05/10/2017   • Keflex Itching 2012   • Keflex  05/10/2017       PE:    Blood pressure 122/82, weight 59.9 kg (132 lb), last menstrual period 2017, not currently breastfeeding.    see prenatal physical    LABS: Pending    A/P:  23 y.o.  12w6d based upon  Patient's last menstrual period was 2017..  She is here for her new obstetric visit.    No diagnosis found.    1.  Ultrasound for dates and anatomy to be scheduled in 6-7 weeks.  2.  Claritin or Benadryl for prn use for rash  3.  [facility] [place] will call lab for results  4.  NOB packet given with ACOG prenatal book.  5.  Increase water intake and encouraged healthy nutrition.  6.  Pap smear collected today.  7.  Begin prenatal vitamins.  8.  Follow-up in 4 weeks.

## 2018-03-10 LAB
C TRACH DNA GENITAL QL NAA+PROBE: NEGATIVE
CYTOLOGY REG CYTOL: NORMAL
N GONORRHOEA DNA GENITAL QL NAA+PROBE: NEGATIVE
SPECIMEN SOURCE: NORMAL

## 2018-03-30 ENCOUNTER — TELEPHONE (OUTPATIENT)
Dept: OBGYN | Facility: CLINIC | Age: 23
End: 2018-03-30

## 2018-03-30 NOTE — TELEPHONE ENCOUNTER
Called and explained that we have no providers in the office today and I advised her to go to urgent care and be seen about her migraines since she has tried the tylenol and it is not helping her at all. Pt verbalizes understanding and states she will comply.

## 2018-03-30 NOTE — TELEPHONE ENCOUNTER
----- Message from Rosalind Ceballos sent at 3/30/2018  8:34 AM PDT -----  Regarding: questions  Contact: [phone]  Patient called and states that for the past week she has been having really bad migraine and she cant even get out of bed. She states she has tried tylenol and it wont go away.She wants to know if there is anything else she can try? Thank you.

## 2018-04-03 ENCOUNTER — HOSPITAL ENCOUNTER (EMERGENCY)
Facility: MEDICAL CENTER | Age: 23
End: 2018-04-03
Attending: EMERGENCY MEDICINE
Payer: COMMERCIAL

## 2018-04-03 ENCOUNTER — APPOINTMENT (OUTPATIENT)
Dept: RADIOLOGY | Facility: MEDICAL CENTER | Age: 23
End: 2018-04-03
Attending: EMERGENCY MEDICINE
Payer: COMMERCIAL

## 2018-04-03 ENCOUNTER — TELEPHONE (OUTPATIENT)
Dept: OBGYN | Facility: MEDICAL CENTER | Age: 23
End: 2018-04-03

## 2018-04-03 VITALS
DIASTOLIC BLOOD PRESSURE: 62 MMHG | BODY MASS INDEX: 24.18 KG/M2 | TEMPERATURE: 98.3 F | RESPIRATION RATE: 16 BRPM | SYSTOLIC BLOOD PRESSURE: 118 MMHG | WEIGHT: 136.47 LBS | OXYGEN SATURATION: 100 % | HEART RATE: 72 BPM | HEIGHT: 63 IN

## 2018-04-03 DIAGNOSIS — R10.9 ABDOMINAL PAIN DURING PREGNANCY IN SECOND TRIMESTER: ICD-10-CM

## 2018-04-03 DIAGNOSIS — O26.892 ABDOMINAL PAIN DURING PREGNANCY IN SECOND TRIMESTER: ICD-10-CM
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
## 2018-04-03 LAB
B-HCG SERPL-ACNC: ABNORMAL MIU/ML (ref 0–5)
BASOPHILS # BLD AUTO: 0.4 % (ref 0–1.8)
BASOPHILS # BLD: 0.03 K/UL (ref 0–0.12)
EOSINOPHIL # BLD AUTO: 0.02 K/UL (ref 0–0.51)
EOSINOPHIL NFR BLD: 0.3 % (ref 0–6.9)
ERYTHROCYTE [DISTWIDTH] IN BLOOD BY AUTOMATED COUNT: 46.5 FL (ref 35.9–50)
HCT VFR BLD AUTO: 34.9 % (ref 37–47)
HGB BLD-MCNC: 11.8 G/DL (ref 12–16)
IMM GRANULOCYTES # BLD AUTO: 0.03 K/UL (ref 0–0.11)
IMM GRANULOCYTES NFR BLD AUTO: 0.4 % (ref 0–0.9)
LYMPHOCYTES # BLD AUTO: 1.43 K/UL (ref 1–4.8)
LYMPHOCYTES NFR BLD: 19.5 % (ref 22–41)
MCH RBC QN AUTO: 29.9 PG (ref 27–33)
MCHC RBC AUTO-ENTMCNC: 33.8 G/DL (ref 33.6–35)
MCV RBC AUTO: 88.6 FL (ref 81.4–97.8)
MONOCYTES # BLD AUTO: 0.37 K/UL (ref 0–0.85)
MONOCYTES NFR BLD AUTO: 5.1 % (ref 0–13.4)
NEUTROPHILS # BLD AUTO: 5.44 K/UL (ref 2–7.15)
NEUTROPHILS NFR BLD: 74.3 % (ref 44–72)
NRBC # BLD AUTO: 0 K/UL
NRBC BLD-RTO: 0 /100 WBC
NUMBER OF RH DOSES IND 8505RD: NORMAL
PLATELET # BLD AUTO: 209 K/UL (ref 164–446)
PMV BLD AUTO: 9.2 FL (ref 9–12.9)
RBC # BLD AUTO: 3.94 M/UL (ref 4.2–5.4)
RH BLD: NORMAL
WBC # BLD AUTO: 7.3 K/UL (ref 4.8–10.8)

## 2018-04-03 PROCEDURE — 85025 COMPLETE CBC W/AUTO DIFF WBC: CPT

## 2018-04-03 PROCEDURE — 76815 OB US LIMITED FETUS(S): CPT

## 2018-04-03 PROCEDURE — 84702 CHORIONIC GONADOTROPIN TEST: CPT

## 2018-04-03 PROCEDURE — A9270 NON-COVERED ITEM OR SERVICE: HCPCS | Performed by: EMERGENCY MEDICINE

## 2018-04-03 PROCEDURE — 99285 EMERGENCY DEPT VISIT HI MDM: CPT

## 2018-04-03 PROCEDURE — 86901 BLOOD TYPING SEROLOGIC RH(D): CPT

## 2018-04-03 PROCEDURE — 700102 HCHG RX REV CODE 250 W/ 637 OVERRIDE(OP): Performed by: EMERGENCY MEDICINE

## 2018-04-03 RX ORDER — ONDANSETRON 4 MG/1
4 TABLET, ORALLY DISINTEGRATING ORAL EVERY 8 HOURS PRN
Qty: 15 TAB | Refills: 0 | Status: ON HOLD | OUTPATIENT
Start: 2018-04-03 | End: 2018-09-15

## 2018-04-03 RX ORDER — ACETAMINOPHEN 325 MG/1
650 TABLET ORAL ONCE
Status: COMPLETED | OUTPATIENT
Start: 2018-04-03 | End: 2018-04-03

## 2018-04-03 RX ADMIN — ACETAMINOPHEN 650 MG: 325 TABLET, FILM COATED ORAL at 10:25

## 2018-04-03 ASSESSMENT — PAIN SCALES - GENERAL: PAINLEVEL_OUTOF10: 0

## 2018-04-03 NOTE — DISCHARGE INSTRUCTIONS
Abdominal Pain During Pregnancy  Belly (abdominal) pain is common during pregnancy. Most of the time, it is not a serious problem. Other times, it can be a sign that something is wrong with the pregnancy. Always tell your doctor if you have belly pain.  Follow these instructions at home:  Monitor your belly pain for any changes. The following actions may help you feel better:  · Do not have sex (intercourse) or put anything in your vagina until you feel better.  · Rest until your pain stops.  · Drink clear fluids if you feel sick to your stomach (nauseous). Do not eat solid food until you feel better.  · Only take medicine as told by your doctor.  · Keep all doctor visits as told.  Get help right away if:  · You are bleeding, leaking fluid, or pieces of tissue come out of your vagina.  · You have more pain or cramping.  · You keep throwing up (vomiting).  · You have pain when you pee (urinate) or have blood in your pee.  · You have a fever.  · You do not feel your baby moving as much.  · You feel very weak or feel like passing out.  · You have trouble breathing, with or without belly pain.  · You have a very bad headache and belly pain.  · You have fluid leaking from your vagina and belly pain.  · You keep having watery poop (diarrhea).  · Your belly pain does not go away after resting, or the pain gets worse.  This information is not intended to replace advice given to you by your health care provider. Make sure you discuss any questions you have with your health care provider.  Document Released: 12/06/2010 Document Revised: 07/26/2017 Document Reviewed: 07/17/2014  Elsevier Interactive Patient Education © 2017 [employer]        Subchorionic Hematoma  A subchorionic hematoma is a gathering of blood between the outer wall of the placenta and the inner wall of the womb (uterus). The placenta is the organ that connects the fetus to the wall of the uterus. The placenta performs the feeding, breathing (oxygen to the  fetus), and waste removal (excretory work) of the fetus.   Subchorionic hematoma is the most common abnormality found on a result from ultrasonography done during the first trimester or early second trimester of pregnancy. If there has been little or no vaginal bleeding, early small hematomas usually shrink on their own and do not affect your baby or pregnancy. The blood is gradually absorbed over 1-2 weeks. When bleeding starts later in pregnancy or the hematoma is larger or occurs in an older pregnant woman, the outcome may not be as good. Larger hematomas may get bigger, which increases the chances for miscarriage. Subchorionic hematoma also increases the risk of premature detachment of the placenta from the uterus,  (premature) labor, and stillbirth.  HOME CARE INSTRUCTIONS  · Stay on bed rest if your health care provider recommends this. Although bed rest will not prevent more bleeding or prevent a miscarriage, your health care provider may recommend bed rest until you are advised otherwise.  · Avoid heavy lifting (more than 10 lb [4.5 kg]), exercise, sexual intercourse, or douching as directed by your health care provider.  · Keep track of the number of pads you use each day and how soaked (saturated) they are. Write down this information.  · Do not use tampons.  · Keep all follow-up appointments as directed by your health care provider. Your health care provider may ask you to have follow-up blood tests or ultrasound tests or both.  SEEK IMMEDIATE MEDICAL CARE IF:  · You have severe cramps in your stomach, back, abdomen, or pelvis.  · You have a fever.  · You pass large clots or tissue. Save any tissue for your health care provider to look at.  · Your bleeding increases or you become lightheaded, feel weak, or have fainting episodes.  This information is not intended to replace advice given to you by your health care provider. Make sure you discuss any questions you have with your health care  provider.  Document Released: 04/03/2008 Document Revised: 01/08/2016 Document Reviewed: 07/17/2014  [employer] Interactive Patient Education © 2017 [employer]
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
## 2018-04-04 ENCOUNTER — ROUTINE PRENATAL (OUTPATIENT)
Dept: OBGYN | Facility: MEDICAL CENTER | Age: 23
End: 2018-04-04
Payer: COMMERCIAL

## 2018-04-04 ENCOUNTER — PATIENT OUTREACH (OUTPATIENT)
Dept: HEALTH INFORMATION MANAGEMENT | Facility: OTHER | Age: 23
End: 2018-04-04

## 2018-04-04 VITALS — SYSTOLIC BLOOD PRESSURE: 125 MMHG | BODY MASS INDEX: 23.38 KG/M2 | DIASTOLIC BLOOD PRESSURE: 80 MMHG | WEIGHT: 132 LBS

## 2018-04-04 DIAGNOSIS — Z34.82 ENCOUNTER FOR SUPERVISION OF OTHER NORMAL PREGNANCY IN SECOND TRIMESTER: ICD-10-CM

## 2018-04-04 DIAGNOSIS — N83.201 RIGHT OVARIAN CYST: ICD-10-CM

## 2018-04-04 DIAGNOSIS — R76.8 BIOLOGICAL FALSE POSITIVE RPR TEST: ICD-10-CM

## 2018-04-04 DIAGNOSIS — Z98.891 HX OF CESAREAN SECTION: ICD-10-CM

## 2018-04-04 PROCEDURE — 90040 PR PRENATAL FOLLOW UP: CPT | Performed by: OBSTETRICS & GYNECOLOGY
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
## 2018-04-10 ENCOUNTER — HOSPITAL ENCOUNTER (OUTPATIENT)
Dept: LAB | Facility: MEDICAL CENTER | Age: 23
End: 2018-04-10
Attending: OBSTETRICS & GYNECOLOGY
Payer: COMMERCIAL

## 2018-04-10 DIAGNOSIS — Z34.82 ENCOUNTER FOR SUPERVISION OF OTHER NORMAL PREGNANCY IN SECOND TRIMESTER: ICD-10-CM

## 2018-04-10 LAB
HBV SURFACE AG SER QL: NEGATIVE
HIV 1+2 AB+HIV1 P24 AG SERPL QL IA: NON REACTIVE
RUBV AB SER QL: 249.3 IU/ML
TREPONEMA PALLIDUM IGG+IGM AB [PRESENCE] IN SERUM OR PLASMA BY IMMUNOASSAY: NON REACTIVE

## 2018-04-10 PROCEDURE — 36415 COLL VENOUS BLD VENIPUNCTURE: CPT

## 2018-04-10 PROCEDURE — 87389 HIV-1 AG W/HIV-1&-2 AB AG IA: CPT

## 2018-04-10 PROCEDURE — 86762 RUBELLA ANTIBODY: CPT

## 2018-04-10 PROCEDURE — 86780 TREPONEMA PALLIDUM: CPT

## 2018-04-10 PROCEDURE — 87340 HEPATITIS B SURFACE AG IA: CPT

## 2018-04-16 ENCOUNTER — HOSPITAL ENCOUNTER (OUTPATIENT)
Dept: RADIOLOGY | Facility: MEDICAL CENTER | Age: 23
End: 2018-04-16
Attending: OBSTETRICS & GYNECOLOGY
Payer: COMMERCIAL

## 2018-04-16 DIAGNOSIS — Z34.82 ENCOUNTER FOR SUPERVISION OF OTHER NORMAL PREGNANCY IN SECOND TRIMESTER: ICD-10-CM

## 2018-04-16 PROCEDURE — 76805 OB US >/= 14 WKS SNGL FETUS: CPT

## 2018-05-02 ENCOUNTER — ROUTINE PRENATAL (OUTPATIENT)
Dept: OBGYN | Facility: CLINIC | Age: 23
End: 2018-05-02
Payer: COMMERCIAL

## 2018-05-02 VITALS — BODY MASS INDEX: 24.62 KG/M2 | SYSTOLIC BLOOD PRESSURE: 118 MMHG | DIASTOLIC BLOOD PRESSURE: 62 MMHG | WEIGHT: 139 LBS

## 2018-05-02 DIAGNOSIS — N83.201 RIGHT OVARIAN CYST: ICD-10-CM

## 2018-05-02 DIAGNOSIS — G44.52 NEW DAILY PERSISTENT HEADACHE: ICD-10-CM

## 2018-05-02 DIAGNOSIS — G43.019 INTRACTABLE MIGRAINE WITHOUT AURA AND WITHOUT STATUS MIGRAINOSUS: ICD-10-CM

## 2018-05-02 DIAGNOSIS — Z98.891 HX OF CESAREAN SECTION: ICD-10-CM

## 2018-05-02 DIAGNOSIS — R76.8 BIOLOGICAL FALSE POSITIVE RPR TEST: ICD-10-CM

## 2018-05-02 PROCEDURE — 90040 PR PRENATAL FOLLOW UP: CPT | Performed by: OBSTETRICS & GYNECOLOGY

## 2018-05-02 RX ORDER — BUTALBITAL, ACETAMINOPHEN AND CAFFEINE 50; 325; 40 MG/1; MG/1; MG/1
1 TABLET ORAL EVERY 4 HOURS PRN
Qty: 30 TAB | Refills: 0 | Status: SHIPPED | OUTPATIENT
Start: 2018-05-02 | End: 2018-05-17 | Stop reason: SDUPTHER

## 2018-05-02 NOTE — PROGRESS NOTES
Brenda Westbrook is a 23 y.o.  at 20w5d here today for obstetrical visit.  Patient is with complaints.    She reports fair fetal movement.  She denies vaginal bleeding.  She denies rupture of membranes.  She denies contractions.     has Asthma, exercise induced; Intractable migraine without aura and without status migrainosus; High-risk pregnancy in first trimester; Endometriosis determined by laparoscopy; Biological false positive RPR test; Hx of  section; and Right ovarian cyst on her problem list.    Patient reports daily headaches. She states that she has had a history of migraines prior to pregnancy but resolved with a piercing of the ear. She now states that her headaches have returned she has tried Tylenol and Phenergan without relief. Patient used to see Dr. Leone in neurology clinic will place referral for follow-up.  Prescription for Fioricet was given.    Discussed with patient appropriate candidacy for trial of labor after  delivery. Patient does have a previous  ×1, 2 layer closure. Patient is appropriate candidate for trial of labor if so desires. After discussion of risks and benefits associated with vaginal birth after  including risk of uterine rupture being one in 1000, also discussed with patient the possibility that if uterus ruptures may be impossible for us to deliver the baby without having fetal compromise. Also discussed the risks of repeat  delivery. Discussed office policy regarding  which is patient should go into spontaneous labor by 40 weeks otherwise repeat  will be scheduled at 40 weeks. Consent has been given patient to review and will return at next visit    A/P IUP at 20w5d  AFP declined  1 hour glucola   Rhogam   GBS     F/U in 4 weeks

## 2018-05-17 ENCOUNTER — OFFICE VISIT (OUTPATIENT)
Dept: NEUROLOGY | Facility: MEDICAL CENTER | Age: 23
End: 2018-05-17
Payer: COMMERCIAL

## 2018-05-17 VITALS
HEART RATE: 84 BPM | BODY MASS INDEX: 24.71 KG/M2 | SYSTOLIC BLOOD PRESSURE: 108 MMHG | HEIGHT: 63 IN | OXYGEN SATURATION: 96 % | TEMPERATURE: 97.7 F | RESPIRATION RATE: 16 BRPM | DIASTOLIC BLOOD PRESSURE: 58 MMHG | WEIGHT: 139.44 LBS

## 2018-05-17 DIAGNOSIS — G43.019 INTRACTABLE MIGRAINE WITHOUT AURA AND WITHOUT STATUS MIGRAINOSUS: Primary | ICD-10-CM

## 2018-05-17 PROCEDURE — 99214 OFFICE O/P EST MOD 30 MIN: CPT | Performed by: PSYCHIATRY & NEUROLOGY

## 2018-05-17 RX ORDER — PROPRANOLOL HYDROCHLORIDE 120 MG/1
120 CAPSULE, EXTENDED RELEASE ORAL DAILY
Qty: 30 CAP | Refills: 4 | Status: ON HOLD | OUTPATIENT
Start: 2018-05-17 | End: 2018-09-18

## 2018-05-17 RX ORDER — BUTALBITAL, ACETAMINOPHEN AND CAFFEINE 50; 325; 40 MG/1; MG/1; MG/1
1 TABLET ORAL EVERY 4 HOURS PRN
Qty: 30 TAB | Refills: 2 | Status: SHIPPED | OUTPATIENT
Start: 2018-05-17 | End: 2018-10-09

## 2018-05-17 ASSESSMENT — ENCOUNTER SYMPTOMS
MEMORY LOSS: 0
HEADACHES: 1
SEIZURES: 0
LOSS OF CONSCIOUSNESS: 0

## 2018-05-17 ASSESSMENT — PAIN SCALES - GENERAL: PAINLEVEL: 4=SLIGHT-MODERATE PAIN

## 2018-05-17 ASSESSMENT — PATIENT HEALTH QUESTIONNAIRE - PHQ9: CLINICAL INTERPRETATION OF PHQ2 SCORE: 0

## 2018-05-26 ENCOUNTER — HOSPITAL ENCOUNTER (OUTPATIENT)
Facility: MEDICAL CENTER | Age: 23
End: 2018-05-26
Attending: OBSTETRICS & GYNECOLOGY | Admitting: OBSTETRICS & GYNECOLOGY
Payer: COMMERCIAL

## 2018-05-26 VITALS
SYSTOLIC BLOOD PRESSURE: 106 MMHG | TEMPERATURE: 98.6 F | HEART RATE: 77 BPM | HEIGHT: 63 IN | RESPIRATION RATE: 16 BRPM | DIASTOLIC BLOOD PRESSURE: 55 MMHG | BODY MASS INDEX: 24.1 KG/M2 | WEIGHT: 136 LBS

## 2018-05-26 DIAGNOSIS — R10.2 PELVIC PAIN: ICD-10-CM

## 2018-05-26 DIAGNOSIS — N83.201 CYST OF RIGHT OVARY: ICD-10-CM

## 2018-05-27 NOTE — PROGRESS NOTES
1800-report received from TRICIA Pillai RN, care assumed, POC discussed,  1825-in to talk to pt, pt states that the pain she is experiencing feels like the ovarian cyst pain that she was diagnosed with earlier in pregnancy, states that today the pain is 5/10 and before she was 8/10  1845-report given to Dr Salamanca, will write slip for pt to have MRI done on Tuesday and then to follow up in [facility] for MRI results  1905-SARAH Oh CNM gave orders for MRI and discharge  1910-pt discharged home with PTL precautions and MRI slip, verbalized understanding, left ambulatory with SO

## 2018-05-27 NOTE — PROGRESS NOTES
, EDC  = 24-1, hx previous c/s, endometriosis, 6.9 cm right corpus luteum cyst    Pt presents with c/o constant right sided abdominal pain since Thursday which feels like the pain she's had when her ovarian cyst was first diagnosed early in pregnancy. Denies fevers, chills, vomiting. Had constant nausea t/o pregnancy. No real alleviating or aggravating symptom; position changes sometimes make it worse. Denies ctx, leaking, and bleeding. Only felt FM once today.     Pt unable to leave urine sample at this time. TOCO/EFM placed. Audible FM noted and perceived by pt.

## 2018-05-31 ENCOUNTER — ROUTINE PRENATAL (OUTPATIENT)
Dept: OBGYN | Facility: CLINIC | Age: 23
End: 2018-05-31
Payer: COMMERCIAL

## 2018-05-31 VITALS — BODY MASS INDEX: 24.8 KG/M2 | WEIGHT: 140 LBS | SYSTOLIC BLOOD PRESSURE: 98 MMHG | DIASTOLIC BLOOD PRESSURE: 58 MMHG

## 2018-05-31 DIAGNOSIS — Z98.891 HX OF CESAREAN SECTION: ICD-10-CM

## 2018-05-31 DIAGNOSIS — N83.201 RIGHT OVARIAN CYST: ICD-10-CM

## 2018-05-31 DIAGNOSIS — R76.8 BIOLOGICAL FALSE POSITIVE RPR TEST: ICD-10-CM

## 2018-05-31 PROCEDURE — 90040 PR PRENATAL FOLLOW UP: CPT | Performed by: OBSTETRICS & GYNECOLOGY

## 2018-05-31 RX ORDER — ONDANSETRON 4 MG/1
4 TABLET, FILM COATED ORAL EVERY 4 HOURS PRN
Qty: 20 TAB | Refills: 2 | Status: ON HOLD | OUTPATIENT
Start: 2018-05-31 | End: 2018-09-15

## 2018-05-31 NOTE — PROGRESS NOTES
Brenda Westbrook is a 23 y.o.  at 24w6d here today for obstetrical visit.  Patient is without complaints.    She reports good fetal movement.  She denies vaginal bleeding.  She denies rupture of membranes.  She denies contractions.     has Asthma, exercise induced; Intractable migraine without aura and without status migrainosus; High-risk pregnancy in first trimester; Endometriosis determined by laparoscopy; Biological false positive RPR test; Hx of  section; and Right ovarian cyst on her problem list.    Patient has MRI for abd pain with hx of ovarian cyst earlier in pregnancy    A/P IUP at 24w6d  AFP done  1 hour glucola ordered  Rhogam RH pos  GBS     F/U in 4 weeks

## 2018-05-31 NOTE — PROGRESS NOTES
Pt. Here for OB/FU today. Reports Good FM.   Good # [phone]  Pt states having a sharp pain on her right abdomen.   Pharmacy verified.   Pt given 1 HR GTT along with instructions.   Pt states went to [facility] on 5/26/18 for right abdomen pain has an MRI on 6/5/18

## 2018-06-05 ENCOUNTER — HOSPITAL ENCOUNTER (OUTPATIENT)
Dept: RADIOLOGY | Facility: MEDICAL CENTER | Age: 23
End: 2018-06-05
Attending: OBSTETRICS & GYNECOLOGY
Payer: COMMERCIAL

## 2018-06-05 DIAGNOSIS — R10.2 PELVIC PAIN: ICD-10-CM

## 2018-06-05 DIAGNOSIS — N83.201 CYST OF RIGHT OVARY: ICD-10-CM

## 2018-06-05 PROCEDURE — 72195 MRI PELVIS W/O DYE: CPT

## 2018-06-11 ENCOUNTER — HOSPITAL ENCOUNTER (OUTPATIENT)
Dept: LAB | Facility: MEDICAL CENTER | Age: 23
End: 2018-06-11
Attending: OBSTETRICS & GYNECOLOGY
Payer: COMMERCIAL

## 2018-06-11 DIAGNOSIS — Z98.891 HX OF CESAREAN SECTION: ICD-10-CM

## 2018-06-11 LAB
ABO GROUP BLD: NORMAL
BLD GP AB SCN SERPL QL: NORMAL
GLUCOSE 1H P 50 G GLC PO SERPL-MCNC: 109 MG/DL (ref 70–139)
RH BLD: NORMAL

## 2018-06-11 PROCEDURE — 86850 RBC ANTIBODY SCREEN: CPT

## 2018-06-11 PROCEDURE — 82950 GLUCOSE TEST: CPT

## 2018-06-11 PROCEDURE — 86900 BLOOD TYPING SEROLOGIC ABO: CPT

## 2018-06-11 PROCEDURE — 86901 BLOOD TYPING SEROLOGIC RH(D): CPT

## 2018-06-11 PROCEDURE — 36415 COLL VENOUS BLD VENIPUNCTURE: CPT

## 2018-06-18 ENCOUNTER — TELEPHONE (OUTPATIENT)
Dept: NEUROLOGY | Facility: MEDICAL CENTER | Age: 23
End: 2018-06-18

## 2018-06-18 NOTE — TELEPHONE ENCOUNTER
Left voicemail for patient to call me back at [facility]. She had called and stated she needs to change her pharmacy from [facility] to [facility] due to her insurance. I just need to know exactly which [facility] she would like her prescriptions sent to. Office number was provided for the patient.

## 2018-06-25 ENCOUNTER — ROUTINE PRENATAL (OUTPATIENT)
Dept: OBGYN | Facility: CLINIC | Age: 23
End: 2018-06-25
Payer: COMMERCIAL

## 2018-06-25 VITALS — SYSTOLIC BLOOD PRESSURE: 86 MMHG | DIASTOLIC BLOOD PRESSURE: 52 MMHG | WEIGHT: 145 LBS | BODY MASS INDEX: 25.69 KG/M2

## 2018-06-25 DIAGNOSIS — N83.201 RIGHT OVARIAN CYST: ICD-10-CM

## 2018-06-25 DIAGNOSIS — R76.8 BIOLOGICAL FALSE POSITIVE RPR TEST: ICD-10-CM

## 2018-06-25 DIAGNOSIS — Z98.891 HX OF CESAREAN SECTION: ICD-10-CM

## 2018-06-25 PROCEDURE — 90040 PR PRENATAL FOLLOW UP: CPT | Performed by: OBSTETRICS & GYNECOLOGY

## 2018-06-25 NOTE — PROGRESS NOTES
OB Followup;    28w3d    Patient Active Problem List    Diagnosis Date Noted   • Endometriosis determined by laparoscopy 2018   • Biological false positive RPR test 2018   • Hx of  section 2018   • Right ovarian cyst 2018   • High-risk pregnancy in first trimester 2018   • Intractable migraine without aura and without status migrainosus 2017   • Asthma, exercise induced        Vitals:    18 1441   BP: (!) 86/52   Weight: 65.8 kg (145 lb)       Patient presents for followup of OB care. Currently doing well . Good fetal movement no leakage of fluid no contractions or vaginal bleeding        Size equals dates, normal fetal heart rate      Labs; CBC GTT RPR normal    Labor precautions given  Increase oral hydration  Discussed proper weight gain during pregnancy  Continue prenatal vitamins  Increase water intake  Reviewed our group's policy on prenatal visits with all providers in the group    Followup in 2 weeks

## 2018-06-25 NOTE — LETTER
"Count Your Baby's Movements  Another step to a healthy delivery    Brenda Westbrook    How Many Weeks Pregnant? 28 weeks   Date to Begin Countin18              How to use this chart    One way for your physician to keep track of your baby's health is by knowing how often the baby moves (or \"kicks\") in your womb.  You can help your physician to do this by using this chart every day.    Every day, you should see how many hours it takes for your baby to move 10 times.  Start in the morning, as soon as you get up.    · First, write down the time your baby moves until you get to 10.  · Check off one box every time your baby moves until you get to 10.  · Write down the time you finished counting in the last column.  · Total how long it took to count up all 10 movements.  · Finally, fill in the box that shows how long this took.  After counting 10 movements, you no longer have to count any more that day.  The next morning, just start counting again as soon as you get up.    What should you call a \"movement\"?  It is hard to say, because it will feel different from one mother to another and from one pregnancy to the next.  The important thing is that you count the movements the same way throughout your pregnancy.  If you have more questions, you should ask your physician.    Count carefully every day!  SAMPLE:  Week 28    How many hours did it take to feel 10 movements?       Start  Time     1     2     3     4     5     6     7     8     9     10   Finish Time   Mon 8:20 ·  ·  ·  ·  ·  ·  ·  ·  ·  ·  11:40   Tu               [facility]                 IMPORTANT: You should contact your physician if it takes more than two hours for you to feel 10 movements.  Each morning, write down the time and start to count the movements of your baby.  Keep track by checking off one box every time you feel one movement.  When you have felt 10 \"kicks\", write down the " time you finished counting in the last column.  Then fill in the   box (over the check edgar) for the number of hours it took.  Be sure to read the complete instructions on the previous page.

## 2018-07-10 ENCOUNTER — ROUTINE PRENATAL (OUTPATIENT)
Dept: OBGYN | Facility: CLINIC | Age: 23
End: 2018-07-10
Payer: COMMERCIAL

## 2018-07-10 VITALS — BODY MASS INDEX: 26.22 KG/M2 | WEIGHT: 148 LBS | SYSTOLIC BLOOD PRESSURE: 98 MMHG | DIASTOLIC BLOOD PRESSURE: 64 MMHG

## 2018-07-10 DIAGNOSIS — Z98.891 HX OF CESAREAN SECTION: ICD-10-CM

## 2018-07-10 DIAGNOSIS — R76.8 BIOLOGICAL FALSE POSITIVE RPR TEST: ICD-10-CM

## 2018-07-10 DIAGNOSIS — N83.201 RIGHT OVARIAN CYST: ICD-10-CM

## 2018-07-10 DIAGNOSIS — O36.8130 DECREASED FETAL MOVEMENT AFFECTING MANAGEMENT OF PREGNANCY IN THIRD TRIMESTER, SINGLE OR UNSPECIFIED FETUS: ICD-10-CM

## 2018-07-10 LAB
NST ACOUSTIC STIMULATION: NO
NST ACTION NECESSARY: NORMAL
NST ASSESSMENT: NORMAL
NST BASELINE: 140
NST INDICATIONS: NORMAL
NST OTHER DATA: NORMAL
NST READ BY: NORMAL
NST RETURN: NORMAL
NST UTERINE ACTIVITY: NO

## 2018-07-10 PROCEDURE — 90040 PR PRENATAL FOLLOW UP: CPT | Performed by: OBSTETRICS & GYNECOLOGY

## 2018-07-10 PROCEDURE — 59025 FETAL NON-STRESS TEST: CPT | Performed by: OBSTETRICS & GYNECOLOGY

## 2018-07-10 NOTE — PROGRESS NOTES
Brenda Westbrook is a 23 y.o.  at 30w4d here today for obstetrical visit.  Patient is with complaints.    She reports poor fetal movement.  She denies vaginal bleeding.  She denies rupture of membranes.  She denies contractions.     has Asthma, exercise induced; Intractable migraine without aura and without status migrainosus; High-risk pregnancy in first trimester; Endometriosis determined by laparoscopy; Biological false positive RPR test; Hx of  section; and Right ovarian cyst - resolved on MRI pelvis 18 on her problem list.    NST today    A/P IUP at 30w4d  AFP done  1 hour glucola done  Rhogam a pos  GBS     F/U in 2 weeks

## 2018-07-10 NOTE — LETTER
"Count Your Baby's Movements  Another step to a healthy delivery        How Many Weeks Pregnant? 30 wks 4 days  Date to Begin Countin/10/18              How to use this chart    One way for your physician to keep track of your baby's health is by knowing how often the baby moves (or \"kicks\") in your womb.  You can help your physician to do this by using this chart every day.    Every day, you should see how many hours it takes for your baby to move 10 times.  Start in the morning, as soon as you get up.    · First, write down the time your baby moves until you get to 10.  · Check off one box every time your baby moves until you get to 10.  · Write down the time you finished counting in the last column.  · Total how long it took to count up all 10 movements.  · Finally, fill in the box that shows how long this took.  After counting 10 movements, you no longer have to count any more that day.  The next morning, just start counting again as soon as you get up.    What should you call a \"movement\"?  It is hard to say, because it will feel different from one mother to another and from one pregnancy to the next.  The important thing is that you count the movements the same way throughout your pregnancy.  If you have more questions, you should ask your physician.    Count carefully every day!  SAMPLE:  Week 28    How many hours did it take to feel 10 movements?       Start  Time     1     2     3     4     5     6     7     8     9     10   Finish Time   Mon 8:20 ·  ·  ·  ·  ·  ·  ·  ·  ·  ·  11:40   Tu               Sat               Sun                 IMPORTANT: You should contact your physician if it takes more than two hours for you to feel 10 movements.  Each morning, write down the time and start to count the movements of your baby.  Keep track by checking off one box every time you feel one movement.  When you have felt 10 \"kicks\", write down the time you " finished counting in the last column.  Then fill in the   box (over the check edgar) for the number of hours it took.  Be sure to read the complete instructions on the previous page.

## 2018-07-19 ENCOUNTER — HOSPITAL ENCOUNTER (OUTPATIENT)
Facility: MEDICAL CENTER | Age: 23
End: 2018-07-19
Attending: OBSTETRICS & GYNECOLOGY | Admitting: OBSTETRICS & GYNECOLOGY
Payer: COMMERCIAL

## 2018-07-19 VITALS
HEIGHT: 63 IN | SYSTOLIC BLOOD PRESSURE: 110 MMHG | WEIGHT: 140 LBS | BODY MASS INDEX: 24.8 KG/M2 | TEMPERATURE: 98.7 F | DIASTOLIC BLOOD PRESSURE: 57 MMHG | HEART RATE: 90 BPM

## 2018-07-19 LAB
A1 MICROGLOB PLACENTAL VAG QL: NEGATIVE
APPEARANCE UR: ABNORMAL
COLOR UR AUTO: YELLOW
GLUCOSE UR QL STRIP.AUTO: NEGATIVE MG/DL
KETONES UR QL STRIP.AUTO: NEGATIVE MG/DL
LEUKOCYTE ESTERASE UR QL STRIP.AUTO: NEGATIVE
NITRITE UR QL STRIP.AUTO: NEGATIVE
PH UR STRIP.AUTO: 7 [PH]
PROT UR QL STRIP: NEGATIVE MG/DL
RBC UR QL AUTO: NEGATIVE
SP GR UR: 1.01

## 2018-07-19 PROCEDURE — 59025 FETAL NON-STRESS TEST: CPT | Performed by: OBSTETRICS & GYNECOLOGY

## 2018-07-19 PROCEDURE — 84112 EVAL AMNIOTIC FLUID PROTEIN: CPT

## 2018-07-19 PROCEDURE — 81002 URINALYSIS NONAUTO W/O SCOPE: CPT

## 2018-07-20 NOTE — PROGRESS NOTES
24 YO  SHERRI  and GA 31.6.     pt presents to floor w/ c/o decreased FM. Pt reports small gush of fluid on Monday afternoon that continued to 'feel wet' for the rest of the day, but has since stopped. Pt reports cramping starting on Monday, irregular but no large breaks in time between feeling cramping in lower pelvi and backs. Pt reports needing to 'have cxns stopped twice' with previous pregnancy starting at 28 weeks. Pt denies vaginal bleeding. Pt placed on monitor- VSS. Will notify MD. FOB at bedside.     MD notified of pt. Orders for SVE and amnisure received.     pt updated on POC. Pt understands. All questions answered.     sterile spec exam-no pooling noted. amnisure collected. SVE ft/thick. Pt reports increased FM.    amnisure negative.     Coty SPAULDING notified. Discharge orders received.     Pt updated on POC. Discharge instructions given and discussed. All questions answered. Pt denies further needs at this time.     Pt off floor w/ DC instructions and all belongings in hand. Pt stable and alert w/ steady gait.

## 2018-07-23 ENCOUNTER — ROUTINE PRENATAL (OUTPATIENT)
Dept: OBGYN | Facility: CLINIC | Age: 23
End: 2018-07-23
Payer: COMMERCIAL

## 2018-07-23 VITALS — BODY MASS INDEX: 26.22 KG/M2 | SYSTOLIC BLOOD PRESSURE: 112 MMHG | WEIGHT: 148 LBS | DIASTOLIC BLOOD PRESSURE: 60 MMHG

## 2018-07-23 DIAGNOSIS — O09.91 HIGH-RISK PREGNANCY IN FIRST TRIMESTER: ICD-10-CM

## 2018-07-23 DIAGNOSIS — O36.8130 DECREASED FETAL MOVEMENTS IN THIRD TRIMESTER, SINGLE OR UNSPECIFIED FETUS: ICD-10-CM

## 2018-07-23 DIAGNOSIS — R76.8 BIOLOGICAL FALSE POSITIVE RPR TEST: ICD-10-CM

## 2018-07-23 DIAGNOSIS — Z98.891 HX OF CESAREAN SECTION: ICD-10-CM

## 2018-07-23 DIAGNOSIS — O36.8121: ICD-10-CM

## 2018-07-23 LAB
NST ACOUSTIC STIMULATION: NO
NST ACTION NECESSARY: NORMAL
NST ASSESSMENT: REACTIVE
NST BASELINE: 135
NST INDICATIONS: NORMAL
NST OTHER DATA: NORMAL
NST READ BY: NORMAL
NST RETURN: NORMAL
NST UTERINE ACTIVITY: NORMAL

## 2018-07-23 PROCEDURE — 59025 FETAL NON-STRESS TEST: CPT | Performed by: OBSTETRICS & GYNECOLOGY

## 2018-07-23 PROCEDURE — 90040 PR PRENATAL FOLLOW UP: CPT | Performed by: OBSTETRICS & GYNECOLOGY

## 2018-07-23 NOTE — PROGRESS NOTES
Ob visit @ 32w4d. Pt was seen on L+D last week Thursday for DFM. Sent home with reactive NST after 3 hours. Pt now states continued DFM and is not able to get kick counts even after drinking cold and sugary fluids/foods. Denies bleeding or leaking fluid.

## 2018-07-23 NOTE — PROGRESS NOTES
OB Followup;    32w3d    Patient Active Problem List    Diagnosis Date Noted   • Endometriosis determined by laparoscopy 2018   • Biological false positive RPR test 2018   • Hx of  section 2018   • Right ovarian cyst - resolved on MRI pelvis 2018   • High-risk pregnancy in first trimester 2018   • Intractable migraine without aura and without status migrainosus 2017   • Asthma, exercise induced        Vitals:    18 1450   Weight: 67.1 kg (148 lb)       Patient presents for followup of OB care. Currently doing well . Good fetal movement no leakage of fluid no contractions or vaginal bleeding    Decreased fetal movement-NST pending    Size equals dates, normal fetal heart rate        Labor precautions given  Increase oral hydration  Discussed proper weight gain during pregnancy  Continue prenatal vitamins  Increase water intake  Reviewed our group's policy on prenatal visits with all providers in the group    Followup in  2 weeks

## 2018-08-07 ENCOUNTER — ROUTINE PRENATAL (OUTPATIENT)
Dept: OBGYN | Facility: CLINIC | Age: 23
End: 2018-08-07
Payer: COMMERCIAL

## 2018-08-07 VITALS — WEIGHT: 150 LBS | DIASTOLIC BLOOD PRESSURE: 66 MMHG | SYSTOLIC BLOOD PRESSURE: 104 MMHG | BODY MASS INDEX: 26.57 KG/M2

## 2018-08-07 DIAGNOSIS — R76.8 BIOLOGICAL FALSE POSITIVE RPR TEST: ICD-10-CM

## 2018-08-07 DIAGNOSIS — Z98.891 HX OF CESAREAN SECTION: ICD-10-CM

## 2018-08-07 DIAGNOSIS — N83.201 RIGHT OVARIAN CYST: ICD-10-CM

## 2018-08-07 PROCEDURE — 90040 PR PRENATAL FOLLOW UP: CPT | Performed by: OBSTETRICS & GYNECOLOGY

## 2018-08-07 NOTE — PROGRESS NOTES
OB Followup;    34w4d    Patient Active Problem List    Diagnosis Date Noted   • Endometriosis determined by laparoscopy 2018   • Biological false positive RPR test 2018   • Hx of  section 2018   • Right ovarian cyst - resolved on MRI pelvis 2018   • High-risk pregnancy in first trimester 2018   • Intractable migraine without aura and without status migrainosus 2017   • Asthma, exercise induced        Vitals:    18 0905   BP: 104/66   Weight: 68 kg (150 lb)       Patient presents for followup of OB care. Currently doing well . Good fetal movement no leakage of fluid no contractions or vaginal bleeding        Size equals dates, normal fetal heart rate      Patient's previous primary low transverse uterine  section-documented in record for fetal intolerance to labor-patient elects to have  after counseling  Patient understands that  section will be scheduled for her due date if she does not deliver on her own-referral placed    Labor precautions given  Increase oral hydration  Discussed proper weight gain during pregnancy  Continue prenatal vitamins  Increase water intake  Reviewed our group's policy on prenatal visits with all providers in the group    Followup in  1 weeks

## 2018-08-15 ENCOUNTER — ROUTINE PRENATAL (OUTPATIENT)
Dept: OBGYN | Facility: CLINIC | Age: 23
End: 2018-08-15
Payer: COMMERCIAL

## 2018-08-15 ENCOUNTER — HOSPITAL ENCOUNTER (OUTPATIENT)
Facility: MEDICAL CENTER | Age: 23
End: 2018-08-15
Attending: OBSTETRICS & GYNECOLOGY
Payer: COMMERCIAL

## 2018-08-15 VITALS — SYSTOLIC BLOOD PRESSURE: 100 MMHG | BODY MASS INDEX: 26.57 KG/M2 | DIASTOLIC BLOOD PRESSURE: 70 MMHG | WEIGHT: 150 LBS

## 2018-08-15 DIAGNOSIS — R76.8 BIOLOGICAL FALSE POSITIVE RPR TEST: ICD-10-CM

## 2018-08-15 DIAGNOSIS — N83.201 RIGHT OVARIAN CYST: ICD-10-CM

## 2018-08-15 DIAGNOSIS — O09.93 HIGH-RISK PREGNANCY IN THIRD TRIMESTER: ICD-10-CM

## 2018-08-15 DIAGNOSIS — Z98.891 HX OF CESAREAN SECTION: ICD-10-CM

## 2018-08-15 PROCEDURE — 90715 TDAP VACCINE 7 YRS/> IM: CPT | Performed by: OBSTETRICS & GYNECOLOGY

## 2018-08-15 PROCEDURE — 90471 IMMUNIZATION ADMIN: CPT | Performed by: OBSTETRICS & GYNECOLOGY

## 2018-08-15 PROCEDURE — 90040 PR PRENATAL FOLLOW UP: CPT | Performed by: OBSTETRICS & GYNECOLOGY

## 2018-08-15 PROCEDURE — 87653 STREP B DNA AMP PROBE: CPT

## 2018-08-15 NOTE — PROGRESS NOTES
Pt here today for OB follow up, good fetal movement, denies LOF, VB. tdap today, informed pt about GBS today.

## 2018-08-15 NOTE — PROGRESS NOTES
S: Pt presents for routine OB follow up. Good fetal movement.  Periods of regular painful contractions lasting an hour that resolve past couple of days  No vaginal bleeding or leakage of fluid.    Questions answered.    O: /70   Wt 68 kg (150 lb)   LMP 2017   BMI 26.57 kg/m²   Patients' weight gain, fluid intake and exercise level discussed.  Vitals, fundal height , fetal position, and FHR reviewed on flowsheet    Lab:No results found for this or any previous visit (from the past 336 hour(s)).    A/P:  23 y.o.  at 35w5d presents for routine obstetric follow-up.  Size equals dates and/or scan    1.  Continue prenatal vitamins.  2.  Fetal kick counts.  3.  Exercise at least 30 minutes daily.  4.  Increase water intake.  5.  Labor precautions educated.  6.  Follow-up in 1-2 weeks.  7.  GBS done today  8.  Tdap vaccine today

## 2018-08-16 DIAGNOSIS — O09.93 HIGH-RISK PREGNANCY IN THIRD TRIMESTER: ICD-10-CM

## 2018-08-16 DIAGNOSIS — Z98.891 HX OF CESAREAN SECTION: ICD-10-CM

## 2018-08-17 LAB — GP B STREP DNA SPEC QL NAA+PROBE: NEGATIVE

## 2018-08-19 ENCOUNTER — HOSPITAL ENCOUNTER (OUTPATIENT)
Facility: MEDICAL CENTER | Age: 23
End: 2018-08-19
Attending: OBSTETRICS & GYNECOLOGY | Admitting: OBSTETRICS & GYNECOLOGY
Payer: COMMERCIAL

## 2018-08-19 VITALS
HEIGHT: 63 IN | TEMPERATURE: 99 F | SYSTOLIC BLOOD PRESSURE: 108 MMHG | DIASTOLIC BLOOD PRESSURE: 64 MMHG | HEART RATE: 75 BPM | BODY MASS INDEX: 26.58 KG/M2 | WEIGHT: 150 LBS

## 2018-08-19 LAB
APPEARANCE UR: ABNORMAL
COLOR UR AUTO: YELLOW
GLUCOSE UR QL STRIP.AUTO: NEGATIVE MG/DL
KETONES UR QL STRIP.AUTO: NEGATIVE MG/DL
LEUKOCYTE ESTERASE UR QL STRIP.AUTO: NEGATIVE
NITRITE UR QL STRIP.AUTO: NEGATIVE
PH UR STRIP.AUTO: 7 [PH]
PROT UR QL STRIP: NEGATIVE MG/DL
RBC UR QL AUTO: NEGATIVE
SP GR UR: 1.02

## 2018-08-19 PROCEDURE — 59025 FETAL NON-STRESS TEST: CPT | Performed by: OBSTETRICS & GYNECOLOGY

## 2018-08-19 PROCEDURE — 700111 HCHG RX REV CODE 636 W/ 250 OVERRIDE (IP): Performed by: OBSTETRICS & GYNECOLOGY

## 2018-08-19 PROCEDURE — 81002 URINALYSIS NONAUTO W/O SCOPE: CPT

## 2018-08-19 PROCEDURE — 96372 THER/PROPH/DIAG INJ SC/IM: CPT

## 2018-08-19 RX ORDER — TERBUTALINE SULFATE 1 MG/ML
0.25 INJECTION, SOLUTION SUBCUTANEOUS ONCE
Status: COMPLETED | OUTPATIENT
Start: 2018-08-19 | End: 2018-08-19

## 2018-08-19 RX ADMIN — TERBUTALINE SULFATE 0.25 MG: 1 INJECTION, SOLUTION SUBCUTANEOUS at 16:08

## 2018-08-19 NOTE — PROGRESS NOTES
EDC- , EGA- 36.2    1530- Pt arrived to L&D with c/o UC's that started last night.  Denies LOF or VB, reports +FM.  SVE- /-2, no change from last exam in office.  Abdomen palpates soft.  1556- Report to Dr. Alvarenga via phone.  Orders received to give terbutaline then continue to monitor UC's.  1608- Terbutaline given per orders (See MAR).  1626- Pt up to bathroom, urine sample obtained for POC UA.  1720- Pt reports cramping feels the same.  Abdomen palpates soft.  Report to Dr. Alvarenga via phone.  Orders received to discharge pt home.  Discussed labor precautions and kick counts with pt and FOB, verbalize understanding.  Pt reports she feels comfortable going home at this time.  1725- Pt discharged home ambulatory in stable condition with FOB at side.

## 2018-08-22 ENCOUNTER — ROUTINE PRENATAL (OUTPATIENT)
Dept: OBGYN | Facility: CLINIC | Age: 23
End: 2018-08-22
Payer: COMMERCIAL

## 2018-08-22 VITALS — WEIGHT: 151 LBS | BODY MASS INDEX: 26.75 KG/M2 | DIASTOLIC BLOOD PRESSURE: 66 MMHG | SYSTOLIC BLOOD PRESSURE: 112 MMHG

## 2018-08-22 DIAGNOSIS — O09.93 HIGH-RISK PREGNANCY IN THIRD TRIMESTER: ICD-10-CM

## 2018-08-22 DIAGNOSIS — N83.201 RIGHT OVARIAN CYST: ICD-10-CM

## 2018-08-22 DIAGNOSIS — R76.8 BIOLOGICAL FALSE POSITIVE RPR TEST: ICD-10-CM

## 2018-08-22 DIAGNOSIS — Z98.891 HX OF CESAREAN SECTION: ICD-10-CM

## 2018-08-22 PROCEDURE — 90040 PR PRENATAL FOLLOW UP: CPT | Performed by: OBSTETRICS & GYNECOLOGY

## 2018-08-22 NOTE — PROGRESS NOTES
OB Followup;    36w5d    Patient Active Problem List    Diagnosis Date Noted   • Endometriosis determined by laparoscopy 2018   • Biological false positive RPR test 2018   • Hx of  section 2018   • Right ovarian cyst - resolved on MRI pelvis 2018   • High-risk pregnancy in third trimester 2018   • Intractable migraine without aura and without status migrainosus 2017   • Asthma, exercise induced        Vitals:    18 1013   BP: 112/66   Weight: 68.5 kg (151 lb)       Patient presents for followup of OB care. Currently doing well . Good fetal movement no leakage of fluid no contractions or vaginal bleeding        Size equals dates, normal fetal heart rate      Labs; GBS culture negative    Labor precautions given  Increase oral hydration  Discussed proper weight gain during pregnancy  Continue prenatal vitamins  Increase water intake  Reviewed our group's policy on prenatal visits with all providers in the group    Followup in one weeks    Patient still wants

## 2018-08-28 ENCOUNTER — ROUTINE PRENATAL (OUTPATIENT)
Dept: OBGYN | Facility: CLINIC | Age: 23
End: 2018-08-28
Payer: COMMERCIAL

## 2018-08-28 VITALS — BODY MASS INDEX: 26.57 KG/M2 | WEIGHT: 150 LBS | DIASTOLIC BLOOD PRESSURE: 71 MMHG | SYSTOLIC BLOOD PRESSURE: 110 MMHG

## 2018-08-28 DIAGNOSIS — R76.8 BIOLOGICAL FALSE POSITIVE RPR TEST: ICD-10-CM

## 2018-08-28 DIAGNOSIS — N83.201 RIGHT OVARIAN CYST: ICD-10-CM

## 2018-08-28 DIAGNOSIS — O09.93 HIGH-RISK PREGNANCY IN THIRD TRIMESTER: ICD-10-CM

## 2018-08-28 DIAGNOSIS — Z98.891 HX OF CESAREAN SECTION: ICD-10-CM

## 2018-08-28 PROCEDURE — 90040 PR PRENATAL FOLLOW UP: CPT | Performed by: OBSTETRICS & GYNECOLOGY

## 2018-08-28 NOTE — PROGRESS NOTES
OB Followup;    37w4d    Patient Active Problem List    Diagnosis Date Noted   • Endometriosis determined by laparoscopy 2018   • Biological false positive RPR test 2018   • Hx of  section 2018   • Right ovarian cyst - resolved on MRI pelvis 2018   • High-risk pregnancy in third trimester 2018   • Intractable migraine without aura and without status migrainosus 2017   • Asthma, exercise induced        Vitals:    18 0934   BP: 110/71   Weight: 68 kg (150 lb)       Patient presents for followup of OB care. Currently doing well . Good fetal movement no leakage of fluid no contractions or vaginal bleeding        Size equals dates, normal fetal heart rate      Brief bedside ultrasound-cephalic presentation, NATALIA greater than 10    Labor precautions given  Increase oral hydration  Discussed proper weight gain during pregnancy  Continue prenatal vitamins  Increase water intake  Reviewed our group's policy on prenatal visits with all providers in the group    Followup in 1 weeks     Referral has been placed for  section at 40 weeks if patient does not go into labor on her own but it has not been scheduled yet.

## 2018-08-28 NOTE — PROGRESS NOTES
OB Visit @37w4d Pt states active Fm. Denies Bleeding and leaking. Contractions 12 minutes apart. Pt denies any concerns.

## 2018-09-02 ENCOUNTER — HOSPITAL ENCOUNTER (OUTPATIENT)
Facility: MEDICAL CENTER | Age: 23
End: 2018-09-02
Attending: OBSTETRICS & GYNECOLOGY | Admitting: OBSTETRICS & GYNECOLOGY
Payer: COMMERCIAL

## 2018-09-02 VITALS
BODY MASS INDEX: 26.58 KG/M2 | HEIGHT: 63 IN | DIASTOLIC BLOOD PRESSURE: 78 MMHG | SYSTOLIC BLOOD PRESSURE: 121 MMHG | WEIGHT: 150 LBS | TEMPERATURE: 97.7 F | HEART RATE: 114 BPM

## 2018-09-02 LAB
APPEARANCE UR: ABNORMAL
COLOR UR AUTO: YELLOW
GLUCOSE UR QL STRIP.AUTO: NEGATIVE MG/DL
KETONES UR QL STRIP.AUTO: 15 MG/DL
LEUKOCYTE ESTERASE UR QL STRIP.AUTO: ABNORMAL
NITRITE UR QL STRIP.AUTO: NEGATIVE
PH UR STRIP.AUTO: 5.5 [PH]
PROT UR QL STRIP: 30 MG/DL
RBC UR QL AUTO: NEGATIVE
SP GR UR: 1.02

## 2018-09-02 PROCEDURE — 81002 URINALYSIS NONAUTO W/O SCOPE: CPT

## 2018-09-02 PROCEDURE — 96374 THER/PROPH/DIAG INJ IV PUSH: CPT

## 2018-09-02 PROCEDURE — 59025 FETAL NON-STRESS TEST: CPT | Performed by: OBSTETRICS & GYNECOLOGY

## 2018-09-02 PROCEDURE — 700111 HCHG RX REV CODE 636 W/ 250 OVERRIDE (IP)

## 2018-09-02 PROCEDURE — 700105 HCHG RX REV CODE 258: Performed by: OBSTETRICS & GYNECOLOGY

## 2018-09-02 RX ORDER — SODIUM CHLORIDE, SODIUM LACTATE, POTASSIUM CHLORIDE, CALCIUM CHLORIDE 600; 310; 30; 20 MG/100ML; MG/100ML; MG/100ML; MG/100ML
1000 INJECTION, SOLUTION INTRAVENOUS ONCE
Status: COMPLETED | OUTPATIENT
Start: 2018-09-02 | End: 2018-09-02

## 2018-09-02 RX ORDER — ONDANSETRON 2 MG/ML
INJECTION INTRAMUSCULAR; INTRAVENOUS
Status: COMPLETED
Start: 2018-09-02 | End: 2018-09-02

## 2018-09-02 RX ORDER — ONDANSETRON 2 MG/ML
4 INJECTION INTRAMUSCULAR; INTRAVENOUS ONCE
Status: COMPLETED | OUTPATIENT
Start: 2018-09-02 | End: 2018-09-02

## 2018-09-02 RX ADMIN — ONDANSETRON 4 MG: 2 INJECTION INTRAMUSCULAR; INTRAVENOUS at 11:23

## 2018-09-02 RX ADMIN — SODIUM CHLORIDE, POTASSIUM CHLORIDE, SODIUM LACTATE AND CALCIUM CHLORIDE 1000 ML: 600; 310; 30; 20 INJECTION, SOLUTION INTRAVENOUS at 11:23

## 2018-09-02 NOTE — PROGRESS NOTES
" SHERRI  EGA 38.2    Denies LOF or VB    PT presenting with CO decreased FM and vomiting x6 since last night.  She tried 2 anti nausea meds without relieve.     1105 Call to MD orders per MAR.     update to Dr. Wagner. Per MD give PT water and cracers.    PT able to tolerate PO, PT still denies feeling FM, While adjusting monitors PT did say she is feeling \"pushing\".    1205 Update to MD, tracing reviewed, no additional orders okay to DC with precautions.    1215 RN at BS for DC, PT given instructions and verbalized understanding.  Discussed fetal movement in length and when to return.     "

## 2018-09-07 ENCOUNTER — ROUTINE PRENATAL (OUTPATIENT)
Dept: OBGYN | Facility: CLINIC | Age: 23
End: 2018-09-07
Payer: COMMERCIAL

## 2018-09-07 VITALS — SYSTOLIC BLOOD PRESSURE: 125 MMHG | BODY MASS INDEX: 26.75 KG/M2 | DIASTOLIC BLOOD PRESSURE: 78 MMHG | WEIGHT: 151 LBS

## 2018-09-07 DIAGNOSIS — R76.8 BIOLOGICAL FALSE POSITIVE RPR TEST: ICD-10-CM

## 2018-09-07 DIAGNOSIS — Z98.891 HX OF CESAREAN SECTION: ICD-10-CM

## 2018-09-07 DIAGNOSIS — N83.201 RIGHT OVARIAN CYST: ICD-10-CM

## 2018-09-07 DIAGNOSIS — O09.93 HIGH-RISK PREGNANCY IN THIRD TRIMESTER: ICD-10-CM

## 2018-09-07 PROCEDURE — 90040 PR PRENATAL FOLLOW UP: CPT | Performed by: OBSTETRICS & GYNECOLOGY

## 2018-09-07 NOTE — PROGRESS NOTES
OB Visit @39w0d. Active Fm. Pt denies bleeding and leaking and no concerns at this time. Pt aware of C section date and time.

## 2018-09-07 NOTE — PROGRESS NOTES
OB Followup;    39w0d    Patient Active Problem List    Diagnosis Date Noted   • Endometriosis determined by laparoscopy 2018   • Biological false positive RPR test 2018   • Hx of  section 2018   • Right ovarian cyst - resolved on MRI pelvis 2018   • High-risk pregnancy in third trimester 2018   • Intractable migraine without aura and without status migrainosus 2017   • Asthma, exercise induced        Vitals:    18 0921   BP: 125/78   Weight: 68.5 kg (151 lb)       Patient presents for followup of OB care. Currently doing well . Good fetal movement no leakage of fluid no contractions or vaginal bleeding        Size equals dates, normal fetal heart rate      Labs; patient is scheduled for repeat  section on September 15 if she does not go into labor prior to that and deliver via     Labor precautions given  Increase oral hydration  Discussed proper weight gain during pregnancy  Continue prenatal vitamins  Increase water intake  Reviewed our group's policy on prenatal visits with all providers in the group    Followup in 1 weeks

## 2018-09-11 ENCOUNTER — HOSPITAL ENCOUNTER (OUTPATIENT)
Facility: MEDICAL CENTER | Age: 23
End: 2018-09-11
Attending: OBSTETRICS & GYNECOLOGY | Admitting: OBSTETRICS & GYNECOLOGY
Payer: COMMERCIAL

## 2018-09-11 VITALS
SYSTOLIC BLOOD PRESSURE: 120 MMHG | HEIGHT: 63 IN | WEIGHT: 150 LBS | DIASTOLIC BLOOD PRESSURE: 74 MMHG | TEMPERATURE: 99.1 F | HEART RATE: 122 BPM | BODY MASS INDEX: 26.58 KG/M2

## 2018-09-11 PROCEDURE — 59025 FETAL NON-STRESS TEST: CPT | Performed by: OBSTETRICS & GYNECOLOGY

## 2018-09-12 NOTE — PROGRESS NOTES
2P1 SHERRI 9/14 39w4d. Pt presents to L&D with complaints of UC's. Pt taken to triage for assessment.     1902 TOCO and EFM applied, VSS. Pt states that she has been jasmin irregularly since this morning. Pt reports +FM, denies LOF or VB. Pt has a history of a c/s but has signed consents to TOLAC. SVE 1/30/-3. Will update Dr. Yeh once out of delivery.     1948 Dr. Yeh still in delivery, will update ASAP.     1952 Dr. Yeh updated on pt status, orders for discharge received.     1955 RN at bedside, labor precautions given and pt educated on when to return to L&D.     2000 Pt discharged home in stable condition.

## 2018-09-14 ENCOUNTER — ROUTINE PRENATAL (OUTPATIENT)
Dept: OBGYN | Facility: CLINIC | Age: 23
End: 2018-09-14
Payer: COMMERCIAL

## 2018-09-14 VITALS — BODY MASS INDEX: 26.57 KG/M2 | SYSTOLIC BLOOD PRESSURE: 112 MMHG | WEIGHT: 150 LBS | DIASTOLIC BLOOD PRESSURE: 60 MMHG

## 2018-09-14 DIAGNOSIS — O09.93 HIGH-RISK PREGNANCY IN THIRD TRIMESTER: ICD-10-CM

## 2018-09-14 PROCEDURE — 90040 PR PRENATAL FOLLOW UP: CPT | Performed by: OBSTETRICS & GYNECOLOGY

## 2018-09-14 NOTE — PROGRESS NOTES
OB Followup;    40w0d    Patient Active Problem List    Diagnosis Date Noted   • Endometriosis determined by laparoscopy 2018   • Biological false positive RPR test 2018   • Hx of  section 2018   • Right ovarian cyst - resolved on MRI pelvis 2018   • High-risk pregnancy in third trimester 2018   • Intractable migraine without aura and without status migrainosus 2017   • Asthma, exercise induced        Vitals:    18 0841   BP: 112/60   Weight: 68 kg (150 lb)       Patient presents for followup of OB care. Currently doing well . Good fetal movement no leakage of fluid no contractions or vaginal bleeding        Size equals dates, normal fetal heart rate      Patient is scheduled for repeat  section on 9/15 with Dr. Faith  Questions answered regarding  section.    Labor precautions given  Increase oral hydration  Discussed proper weight gain during pregnancy  Continue prenatal vitamins  Increase water intake  Reviewed our group's policy on prenatal visits with all providers in the group    Followup in 1 weeks for postop

## 2018-09-15 ENCOUNTER — HOSPITAL ENCOUNTER (INPATIENT)
Facility: MEDICAL CENTER | Age: 23
LOS: 3 days | End: 2018-09-18
Attending: OBSTETRICS & GYNECOLOGY | Admitting: OBSTETRICS & GYNECOLOGY
Payer: COMMERCIAL

## 2018-09-15 DIAGNOSIS — Z98.891 S/P CESAREAN SECTION: ICD-10-CM

## 2018-09-15 LAB
BASOPHILS # BLD AUTO: 0.7 % (ref 0–1.8)
BASOPHILS # BLD: 0.05 K/UL (ref 0–0.12)
EOSINOPHIL # BLD AUTO: 0.04 K/UL (ref 0–0.51)
EOSINOPHIL NFR BLD: 0.5 % (ref 0–6.9)
ERYTHROCYTE [DISTWIDTH] IN BLOOD BY AUTOMATED COUNT: 50.2 FL (ref 35.9–50)
HCT VFR BLD AUTO: 36.4 % (ref 37–47)
HGB BLD-MCNC: 11.7 G/DL (ref 12–16)
HOLDING TUBE BB 8507: NORMAL
IMM GRANULOCYTES # BLD AUTO: 0.05 K/UL (ref 0–0.11)
IMM GRANULOCYTES NFR BLD AUTO: 0.7 % (ref 0–0.9)
LYMPHOCYTES # BLD AUTO: 1.85 K/UL (ref 1–4.8)
LYMPHOCYTES NFR BLD: 24.9 % (ref 22–41)
MCH RBC QN AUTO: 28.6 PG (ref 27–33)
MCHC RBC AUTO-ENTMCNC: 32.1 G/DL (ref 33.6–35)
MCV RBC AUTO: 89 FL (ref 81.4–97.8)
MONOCYTES # BLD AUTO: 0.57 K/UL (ref 0–0.85)
MONOCYTES NFR BLD AUTO: 7.7 % (ref 0–13.4)
NEUTROPHILS # BLD AUTO: 4.86 K/UL (ref 2–7.15)
NEUTROPHILS NFR BLD: 65.5 % (ref 44–72)
NRBC # BLD AUTO: 0 K/UL
NRBC BLD-RTO: 0 /100 WBC
PLATELET # BLD AUTO: 222 K/UL (ref 164–446)
PMV BLD AUTO: 10.5 FL (ref 9–12.9)
RBC # BLD AUTO: 4.09 M/UL (ref 4.2–5.4)
WBC # BLD AUTO: 7.4 K/UL (ref 4.8–10.8)

## 2018-09-15 PROCEDURE — 700112 HCHG RX REV CODE 229: Performed by: OBSTETRICS & GYNECOLOGY

## 2018-09-15 PROCEDURE — 700101 HCHG RX REV CODE 250

## 2018-09-15 PROCEDURE — 59514 CESAREAN DELIVERY ONLY: CPT

## 2018-09-15 PROCEDURE — 305385 HCHG SURGICAL SERVICES 1/4 HOUR: Performed by: OBSTETRICS & GYNECOLOGY

## 2018-09-15 PROCEDURE — 700111 HCHG RX REV CODE 636 W/ 250 OVERRIDE (IP)

## 2018-09-15 PROCEDURE — 304964 HCHG RECOVERY ROOM TIME 1HR: Performed by: OBSTETRICS & GYNECOLOGY

## 2018-09-15 PROCEDURE — 36415 COLL VENOUS BLD VENIPUNCTURE: CPT

## 2018-09-15 PROCEDURE — 700102 HCHG RX REV CODE 250 W/ 637 OVERRIDE(OP): Performed by: ANESTHESIOLOGY

## 2018-09-15 PROCEDURE — 700111 HCHG RX REV CODE 636 W/ 250 OVERRIDE (IP): Performed by: ANESTHESIOLOGY

## 2018-09-15 PROCEDURE — 700105 HCHG RX REV CODE 258: Performed by: ANESTHESIOLOGY

## 2018-09-15 PROCEDURE — A9270 NON-COVERED ITEM OR SERVICE: HCPCS | Performed by: ANESTHESIOLOGY

## 2018-09-15 PROCEDURE — 85025 COMPLETE CBC W/AUTO DIFF WBC: CPT

## 2018-09-15 PROCEDURE — 306828 HCHG ANES-TIME GENERAL: Performed by: OBSTETRICS & GYNECOLOGY

## 2018-09-15 PROCEDURE — A9270 NON-COVERED ITEM OR SERVICE: HCPCS | Performed by: OBSTETRICS & GYNECOLOGY

## 2018-09-15 PROCEDURE — 770002 HCHG ROOM/CARE - OB PRIVATE (112)

## 2018-09-15 RX ORDER — KETOROLAC TROMETHAMINE 30 MG/ML
30 INJECTION, SOLUTION INTRAMUSCULAR; INTRAVENOUS EVERY 6 HOURS
Status: DISCONTINUED | OUTPATIENT
Start: 2018-09-15 | End: 2018-09-16

## 2018-09-15 RX ORDER — DIPHENHYDRAMINE HYDROCHLORIDE 50 MG/ML
12.5 INJECTION INTRAMUSCULAR; INTRAVENOUS EVERY 6 HOURS PRN
Status: DISCONTINUED | OUTPATIENT
Start: 2018-09-15 | End: 2018-09-16

## 2018-09-15 RX ORDER — SODIUM CHLORIDE, SODIUM LACTATE, POTASSIUM CHLORIDE, CALCIUM CHLORIDE 600; 310; 30; 20 MG/100ML; MG/100ML; MG/100ML; MG/100ML
INJECTION, SOLUTION INTRAVENOUS PRN
Status: DISCONTINUED | OUTPATIENT
Start: 2018-09-15 | End: 2018-09-18 | Stop reason: HOSPADM

## 2018-09-15 RX ORDER — METOCLOPRAMIDE HYDROCHLORIDE 5 MG/ML
10 INJECTION INTRAMUSCULAR; INTRAVENOUS EVERY 6 HOURS PRN
Status: DISCONTINUED | OUTPATIENT
Start: 2018-09-15 | End: 2018-09-16

## 2018-09-15 RX ORDER — CITRIC ACID/SODIUM CITRATE 334-500MG
30 SOLUTION, ORAL ORAL ONCE
Status: COMPLETED | OUTPATIENT
Start: 2018-09-15 | End: 2018-09-15

## 2018-09-15 RX ORDER — METOCLOPRAMIDE HYDROCHLORIDE 5 MG/ML
10 INJECTION INTRAMUSCULAR; INTRAVENOUS ONCE
Status: COMPLETED | OUTPATIENT
Start: 2018-09-15 | End: 2018-09-15

## 2018-09-15 RX ORDER — MISOPROSTOL 200 UG/1
800 TABLET ORAL
Status: DISCONTINUED | OUTPATIENT
Start: 2018-09-15 | End: 2018-09-18 | Stop reason: HOSPADM

## 2018-09-15 RX ORDER — CLINDAMYCIN PHOSPHATE 900 MG/50ML
900 INJECTION, SOLUTION INTRAVENOUS ONCE
Status: DISCONTINUED | OUTPATIENT
Start: 2018-09-15 | End: 2018-09-15 | Stop reason: HOSPADM

## 2018-09-15 RX ORDER — HYDROMORPHONE HYDROCHLORIDE 2 MG/ML
0.4 INJECTION, SOLUTION INTRAMUSCULAR; INTRAVENOUS; SUBCUTANEOUS
Status: DISCONTINUED | OUTPATIENT
Start: 2018-09-15 | End: 2018-09-16

## 2018-09-15 RX ORDER — ONDANSETRON 2 MG/ML
4 INJECTION INTRAMUSCULAR; INTRAVENOUS
Status: DISCONTINUED | OUTPATIENT
Start: 2018-09-15 | End: 2018-09-15

## 2018-09-15 RX ORDER — KETOROLAC TROMETHAMINE 30 MG/ML
30 INJECTION, SOLUTION INTRAMUSCULAR; INTRAVENOUS EVERY 6 HOURS
Status: CANCELLED | OUTPATIENT
Start: 2018-09-15 | End: 2018-09-16

## 2018-09-15 RX ORDER — OXYCODONE HYDROCHLORIDE AND ACETAMINOPHEN 5; 325 MG/1; MG/1
2 TABLET ORAL EVERY 4 HOURS PRN
Status: DISCONTINUED | OUTPATIENT
Start: 2018-09-15 | End: 2018-09-16

## 2018-09-15 RX ORDER — DIPHENHYDRAMINE HYDROCHLORIDE 50 MG/ML
25 INJECTION INTRAMUSCULAR; INTRAVENOUS EVERY 6 HOURS PRN
Status: DISCONTINUED | OUTPATIENT
Start: 2018-09-15 | End: 2018-09-16

## 2018-09-15 RX ORDER — SIMETHICONE 80 MG
80 TABLET,CHEWABLE ORAL 4 TIMES DAILY PRN
Status: DISCONTINUED | OUTPATIENT
Start: 2018-09-15 | End: 2018-09-18 | Stop reason: HOSPADM

## 2018-09-15 RX ORDER — DOCUSATE SODIUM 100 MG/1
100 CAPSULE, LIQUID FILLED ORAL 2 TIMES DAILY PRN
Status: DISCONTINUED | OUTPATIENT
Start: 2018-09-15 | End: 2018-09-18 | Stop reason: HOSPADM

## 2018-09-15 RX ORDER — HYDROMORPHONE HYDROCHLORIDE 2 MG/ML
0.2 INJECTION, SOLUTION INTRAMUSCULAR; INTRAVENOUS; SUBCUTANEOUS
Status: DISCONTINUED | OUTPATIENT
Start: 2018-09-15 | End: 2018-09-16

## 2018-09-15 RX ORDER — SODIUM CHLORIDE, SODIUM LACTATE, POTASSIUM CHLORIDE, CALCIUM CHLORIDE 600; 310; 30; 20 MG/100ML; MG/100ML; MG/100ML; MG/100ML
INJECTION, SOLUTION INTRAVENOUS CONTINUOUS
Status: DISCONTINUED | OUTPATIENT
Start: 2018-09-15 | End: 2018-09-15 | Stop reason: HOSPADM

## 2018-09-15 RX ORDER — NALOXONE HYDROCHLORIDE 0.4 MG/ML
0.1 INJECTION, SOLUTION INTRAMUSCULAR; INTRAVENOUS; SUBCUTANEOUS PRN
Status: DISCONTINUED | OUTPATIENT
Start: 2018-09-15 | End: 2018-09-16

## 2018-09-15 RX ORDER — OXYCODONE HYDROCHLORIDE AND ACETAMINOPHEN 5; 325 MG/1; MG/1
1 TABLET ORAL EVERY 4 HOURS PRN
Status: DISCONTINUED | OUTPATIENT
Start: 2018-09-15 | End: 2018-09-16

## 2018-09-15 RX ORDER — SODIUM CHLORIDE, SODIUM GLUCONATE, SODIUM ACETATE, POTASSIUM CHLORIDE AND MAGNESIUM CHLORIDE 526; 502; 368; 37; 30 MG/100ML; MG/100ML; MG/100ML; MG/100ML; MG/100ML
1500 INJECTION, SOLUTION INTRAVENOUS ONCE
Status: COMPLETED | OUTPATIENT
Start: 2018-09-15 | End: 2018-09-15

## 2018-09-15 RX ORDER — MISOPROSTOL 200 UG/1
800 TABLET ORAL
Status: DISCONTINUED | OUTPATIENT
Start: 2018-09-15 | End: 2018-09-15 | Stop reason: HOSPADM

## 2018-09-15 RX ORDER — ONDANSETRON 2 MG/ML
4 INJECTION INTRAMUSCULAR; INTRAVENOUS EVERY 6 HOURS PRN
Status: DISCONTINUED | OUTPATIENT
Start: 2018-09-15 | End: 2018-09-16

## 2018-09-15 RX ORDER — MIDAZOLAM HYDROCHLORIDE 1 MG/ML
0.5 INJECTION INTRAMUSCULAR; INTRAVENOUS
Status: DISCONTINUED | OUTPATIENT
Start: 2018-09-15 | End: 2018-09-15

## 2018-09-15 RX ADMIN — FENTANYL CITRATE 50 MCG: 50 INJECTION, SOLUTION INTRAMUSCULAR; INTRAVENOUS at 16:09

## 2018-09-15 RX ADMIN — OXYCODONE HYDROCHLORIDE AND ACETAMINOPHEN 2 TABLET: 5; 325 TABLET ORAL at 17:15

## 2018-09-15 RX ADMIN — METOCLOPRAMIDE 10 MG: 5 INJECTION, SOLUTION INTRAMUSCULAR; INTRAVENOUS at 13:27

## 2018-09-15 RX ADMIN — OXYCODONE HYDROCHLORIDE AND ACETAMINOPHEN 1 TABLET: 5; 325 TABLET ORAL at 21:12

## 2018-09-15 RX ADMIN — Medication 20 UNITS: at 16:05

## 2018-09-15 RX ADMIN — DOCUSATE SODIUM 100 MG: 100 CAPSULE, LIQUID FILLED ORAL at 21:12

## 2018-09-15 RX ADMIN — SODIUM CITRATE AND CITRIC ACID MONOHYDRATE 30 ML: 500; 334 SOLUTION ORAL at 13:27

## 2018-09-15 RX ADMIN — KETOROLAC TROMETHAMINE 30 MG: 30 INJECTION, SOLUTION INTRAMUSCULAR; INTRAVENOUS at 21:10

## 2018-09-15 RX ADMIN — SODIUM CHLORIDE, SODIUM GLUCONATE, SODIUM ACETATE, POTASSIUM CHLORIDE AND MAGNESIUM CHLORIDE 1500 ML: 526; 502; 368; 37; 30 INJECTION, SOLUTION INTRAVENOUS at 12:45

## 2018-09-15 RX ADMIN — FAMOTIDINE 20 MG: 10 INJECTION, SOLUTION INTRAVENOUS at 13:27

## 2018-09-15 ASSESSMENT — LIFESTYLE VARIABLES
ALCOHOL_USE: NO
EVER_SMOKED: NEVER

## 2018-09-15 ASSESSMENT — COPD QUESTIONNAIRES
DO YOU EVER COUGH UP ANY MUCUS OR PHLEGM?: NO/ONLY WITH OCCASIONAL COLDS OR INFECTIONS
HAVE YOU SMOKED AT LEAST 100 CIGARETTES IN YOUR ENTIRE LIFE: NO/DON'T KNOW
DURING THE PAST 4 WEEKS HOW MUCH DID YOU FEEL SHORT OF BREATH: NONE/LITTLE OF THE TIME
IN THE PAST 12 MONTHS DO YOU DO LESS THAN YOU USED TO BECAUSE OF YOUR BREATHING PROBLEMS: DISAGREE/UNSURE
COPD SCREENING SCORE: 0

## 2018-09-15 ASSESSMENT — PATIENT HEALTH QUESTIONNAIRE - PHQ9
SUM OF ALL RESPONSES TO PHQ9 QUESTIONS 1 AND 2: 0
2. FEELING DOWN, DEPRESSED, IRRITABLE, OR HOPELESS: NOT AT ALL
1. LITTLE INTEREST OR PLEASURE IN DOING THINGS: NOT AT ALL

## 2018-09-15 ASSESSMENT — PAIN SCALES - GENERAL
PAINLEVEL_OUTOF10: 3
PAINLEVEL_OUTOF10: 0
PAINLEVEL_OUTOF10: 7
PAINLEVEL_OUTOF10: 0
PAINLEVEL_OUTOF10: 0
PAINLEVEL_OUTOF10: 4
PAINLEVEL_OUTOF10: 0

## 2018-09-15 NOTE — PROGRESS NOTES
All charting by MASSIMO Marquez RN, reviewed by LORENE Squires RN. Any changes have been discussed.

## 2018-09-15 NOTE — OR SURGEON
Immediate Post OP Note    PreOp Diagnosis: Term 40 weeks and 1 day, Previous LTCS x 1, desired a TOLAC,                                 Not in labor    PostOp Diagnosis: same    Procedure(s):  REPEAT LOW TRANSVERSE  SECTION VIA PFANNESTIEL - Wound Class: Clean Contaminated  Scar Revision    Surgeon(s):  ROX Adams MD PGY-1    Anesthesiologist/Type of Anesthesia:  No anesthesia staff entered./Spinal    Surgical Staff:  * No surgical staff found *    Specimens removed if any:  Cord gas    Estimated Blood Loss: 600 cc    Findings: delivered to live born baby boy, vertex nuchal cord x 2, tight                  BW - 2545 gm (5 lb 9.8 oz)   AS                   Clear AF                  Placenta complete and intact 3 VC                  Uterus bilateral tubes and ovaries grossly normal    Complications: none    9/15/2018 3:03 PM Rachna Patterson M.D.

## 2018-09-15 NOTE — PROGRESS NOTES
1615 Pt transferred via gurney with baby in arms to [id number]. Report to SLOAN Cortez, at bedside. Cuddles and bands checked by 3 RNs.

## 2018-09-15 NOTE — H&P
History and Physical      Brenda Westbrook is a 23 y.o. female  at 40w1d who presents for elective repeat  section    Subjective:   positive  For CTXS.   negative Feels pain   negative for LOF  negative for vaginal bleeding.   positive for fetal movement    ROS: A comprehensive review of systems was negative.    Past Medical History:   Diagnosis Date   • Asthma, exercise induced    • Bronchitis    • Chlamydia     Oct 2011   • Cold     02/6/15   • Endometriosis    • Head ache    • Indigestion    • Intractable migraine without aura and without status migrainosus 2017    Referral to neuro   • Other specified symptom associated with female genital organs     endometriosis   • Pain     pelvic   • Pain 12    stomach   • Psychiatric problem     depression   • Sleep disturbances    • Unspecified disorder of thyroid 2015    hyperactive in the past not taking any meds currently   • Urinary bladder disorder     frequent uti's     Past Surgical History:   Procedure Laterality Date   • PRIMARY C SECTION Bilateral 2016    Procedure: PRIMARY C SECTION;  Surgeon: Trever Beckman M.D.;  Location: LABOR AND DELIVERY;  Service:    • MANNY BY LAPAROSCOPY  3/12/2015    Procedure: MANNY BY LAPAROSCOPY;  Surgeon: Sean Mitchell M.D.;  Location: SURGERY [facility];  Service:    • PELVISCOPY     • CYSTOSCOPY  2012    Performed by QUINTIN LOPES at SURGERY [facility] ORS   • PELVISCOPY  2012    Performed by CHRISTIANO PUGH at SURGERY SAME DAY [facility] ORS   • MYRINGOTOMY     • STRABISMUS REPAIR      gerardo eyes   • TONSILLECTOMY       OB History    Para Term  AB Living   2 1 1 0 0 1   SAB TAB Ectopic Molar Multiple Live Births   0 0 0     1      # Outcome Date GA Lbr Bhupinder/2nd Weight Sex Delivery Anes PTL Lv   2 Current            1 Term 16 41w0d  3.09 kg (6 lb 13 oz) M CS-LTranv Spinal N MARYSOL      Complications: Fetal Intolerance      Birth Comments: double  nuchal cord, NRFS        Social History     Social History   • Marital status:      Spouse name: N/A   • Number of children: N/A   • Years of education: N/A     Occupational History   • Not on file.     Social History Main Topics   • Smoking status: Never Smoker   • Smokeless tobacco: Never Used   • Alcohol use No   • Drug use: No   • Sexual activity: Yes     Partners: Male     Birth control/ protection: Condom     Other Topics Concern   • Not on file     Social History Narrative    ** Merged History Encounter **          Allergies: Ciprofloxacin; Ciprofloxacin; Codeine; Codeine; Doxycycline; Doxycycline; Keflex; and Keflex    Current Facility-Administered Medications:   •  electrolyte-A (PLASMALYTE-A) infusion 1,500 mL, 1,500 mL, Intravenous, Once, Eldon Friedman M.D.  •  Sod Citrate-Citric Acid (BICITRA) 500-334 MG/5ML solution 30 mL, 30 mL, Oral, Once, Eldon Friedman M.D.  •  famotidine (PEPCID) injection 20 mg, 20 mg, Intravenous, Once, Eldon Friedman M.D.  •  metoclopramide (REGLAN) injection 10 mg, 10 mg, Intravenous, Once, Eldon Friedman M.D.  •  lactated ringers (LR) infusion, , Intravenous, Continuous, Rachna Patterson M.D.  •  LR infusion, , Intravenous, Continuous, Rachna Patterson M.D.  •  clindamycin (CLEOCIN) IVPB premix 900 mg, 900 mg, Intravenous, Once, Rachna Patterson M.D.  •  miSOPROStol (CYTOTEC) tablet 800 mcg, 800 mcg, Rectal, Once PRN, Rachna Patterson M.D.    Prenatal care with [facility] starting at 12 weeks with following problems:  Patient Active Problem List    Diagnosis Date Noted   • Endometriosis determined by laparoscopy 2018   • Biological false positive RPR test 2018   • Hx of  section 2018   • Right ovarian cyst - resolved on MRI pelvis 2018   • High-risk pregnancy in third trimester 2018   • Intractable migraine without aura and without status migrainosus 2017   •  "Asthma, exercise induced      Objective:      Blood pressure 132/83, pulse 80, height 1.6 m (5' 2.99\"), weight 68 kg (150 lb), last menstrual period 12/08/2017.    General:   no acute distress, alert, cooperative   Skin:   normal   HEENT:  Sclera clear, anicteric   Lungs:   CTA bilateral   Heart:   S1, S2 normal, no murmur, click, rub or gallop, regular rate and rhythm   Abdomen:   gravid, NT   EFW:  [id number]   Pelvis:  adequate with gynecoid pelvis   FHT:  150 BPM; Category I   Uterine Size: S=D   Presentations: Cephalic   Cervix:     Dilation: 1cm    Effacement: Long    Station:  0    Consistency: Soft    Position: Anterior     Lab Review  Lab:   Blood type: A     Recent Results (from the past 5880 hour(s))   POCT Urinalysis    Collection Time: 02/16/18 10:05 AM   Result Value Ref Range    POC Color  Negative    POC Appearance  Negative    POC Leukocyte Esterase neg Negative    POC Nitrites neg Negative    POC Urobiligen  Negative (0.2) mg/dL    POC Protein trace Negative mg/dL    POC Urine PH 6.0 5.0 - 8.0    POC Blood neg Negative    POC Specific Gravity 1.025 <1.005 - >1.030    POC Ketones neg Negative mg/dL    POC Bilirubin  Negative mg/dL    POC Glucose neg Negative mg/dL   URINALYSIS,CULTURE IF INDICATED    Collection Time: 02/16/18 10:12 AM   Result Value Ref Range    Color Yellow     Character Clear     Specific Gravity 1.023 <1.035    Ph 5.5 5.0 - 8.0    Glucose Negative Negative mg/dL    Ketones Trace (A) Negative mg/dL    Protein Negative Negative mg/dL    Bilirubin Negative Negative    Urobilinogen, Urine 1.0 Negative    Nitrite Negative Negative    Leukocyte Esterase Trace (A) Negative    Occult Blood Negative Negative    Micro Urine Req Microscopic     Culture Indicated Yes UA Culture   URINE CULTURE(NEW)    Collection Time: 02/16/18 10:12 AM   Result Value Ref Range    Significant Indicator NEG     Source UR     Site      Urine Culture No growth at 48 hours.    URINE MICROSCOPIC (W/UA)    " Collection Time: 02/16/18 10:12 AM   Result Value Ref Range    WBC 10-20 (A) /hpf    RBC 0-2 /hpf    Bacteria Moderate (A) None /hpf    Epithelial Cells Few /hpf    Ca Oxalate Crystal Rare /hpf    Hyaline Cast 3-5 (A) /lpf   AMBIGUOUS DATE/TIME    Collection Time: 02/16/18 12:21 PM   Result Value Ref Range    Ambiguous Date/Time See Below:    THINPREP RFLX HPV ASCUS W/CTNG    Collection Time: 03/08/18  1:02 PM   Result Value Ref Range    Cytology Reg See Path Report     Source Cervical     C. trachomatis by PCR Negative Negative    N. gonorrhoeae by PCR Negative Negative   RH TYPE FOR RHOGAM FROM E.D.    Collection Time: 04/03/18 10:22 AM   Result Value Ref Range    Emergency Department Rh Typing POS     Number Of Rh Doses Indicated ZERO    HCG QUANTITATIVE SERUM    Collection Time: 04/03/18 10:25 AM   Result Value Ref Range    Bhcg 82415.7 (H) 0.0 - 5.0 mIU/mL   CBC WITH DIFFERENTIAL    Collection Time: 04/03/18 10:26 AM   Result Value Ref Range    WBC 7.3 4.8 - 10.8 K/uL    RBC 3.94 (L) 4.20 - 5.40 M/uL    Hemoglobin 11.8 (L) 12.0 - 16.0 g/dL    Hematocrit 34.9 (L) 37.0 - 47.0 %    MCV 88.6 81.4 - 97.8 fL    MCH 29.9 27.0 - 33.0 pg    MCHC 33.8 33.6 - 35.0 g/dL    RDW 46.5 35.9 - 50.0 fL    Platelet Count 209 164 - 446 K/uL    MPV 9.2 9.0 - 12.9 fL    Neutrophils-Polys 74.30 (H) 44.00 - 72.00 %    Lymphocytes 19.50 (L) 22.00 - 41.00 %    Monocytes 5.10 0.00 - 13.40 %    Eosinophils 0.30 0.00 - 6.90 %    Basophils 0.40 0.00 - 1.80 %    Immature Granulocytes 0.40 0.00 - 0.90 %    Nucleated RBC 0.00 /100 WBC    Neutrophils (Absolute) 5.44 2.00 - 7.15 K/uL    Lymphs (Absolute) 1.43 1.00 - 4.80 K/uL    Monos (Absolute) 0.37 0.00 - 0.85 K/uL    Eos (Absolute) 0.02 0.00 - 0.51 K/uL    Baso (Absolute) 0.03 0.00 - 0.12 K/uL    Immature Granulocytes (abs) 0.03 0.00 - 0.11 K/uL    NRBC (Absolute) 0.00 K/uL   HIV AG/AB COMBO ASSAY SCREENING    Collection Time: 04/10/18 12:22 PM   Result Value Ref Range    HIV Ag/Ab Combo  Assay Non Reactive Non Reactive   HEP B SURFACE ANTIGEN    Collection Time: 04/10/18 12:22 PM   Result Value Ref Range    Hepatitis B Surface Antigen Negative Negative   RUBELLA ABS IGG    Collection Time: 04/10/18 12:22 PM   Result Value Ref Range    Rubella IgG Antibody 249.30 IU/mL   T.PALLIDUM AB EIA    Collection Time: 04/10/18 12:22 PM   Result Value Ref Range    Syphilis, Treponemal Qual Non Reactive Non Reactive   GLUCOSE 1HR GESTATIONAL    Collection Time: 06/11/18  9:56 AM   Result Value Ref Range    Glucose, Post Dose 109 70 - 139 mg/dL   ABO AND RH DETERMINATION    Collection Time: 06/11/18  9:56 AM   Result Value Ref Range    ABO Grouping Only A     Rh Grouping Only POS    ANTIBODY SCREEN    Collection Time: 06/11/18  9:56 AM   Result Value Ref Range    Antibody Screen Scrn NEG    POCT Fetal Nonstress Test    Collection Time: 07/10/18 11:22 AM   Result Value Ref Range    NST Indications decreased FM     NST Baseline 140     NST Uterine Activity no     NST Acoustic Stimulation no     NST Assessment Appropriate for gestational age     NST Action Necessary      NST Other Data      NST Return      NST Read By     POC UA    Collection Time: 07/19/18  8:00 PM   Result Value Ref Range    POC Color Yellow     POC Appearance Slightly Cloudy (A)     POC Glucose Negative Negative mg/dL    POC Ketones Negative Negative mg/dL    POC Specific Gravity 1.015 1.005 - 1.030    POC Blood Negative Negative    POC Urine PH 7.0 5.0 - 8.0    POC Protein Negative Negative mg/dL    POC Nitrites Negative Negative    POC Leukocyte Esterase Negative Negative   AMNISURE ROM ASSAY    Collection Time: 07/19/18  8:05 PM   Result Value Ref Range    AmniSure ROM Negative Negative   POCT Fetal Nonstress Test    Collection Time: 07/23/18  3:46 PM   Result Value Ref Range    NST Indications DFM     NST Baseline 135     NST Uterine Activity      NST Acoustic Stimulation no     NST Assessment reactive     NST Action Necessary none     NST  Other Data      NST Return      NST Read By     GRP B STREP, BY PCR (RICHARD BROTH)    Collection Time: 08/15/18  3:34 PM   Result Value Ref Range    Strep Gp B DNA PCR Negative Negative   POC UA    Collection Time: 18  4:20 PM   Result Value Ref Range    POC Color Yellow     POC Appearance Cloudy (A)     POC Glucose Negative Negative mg/dL    POC Ketones Negative Negative mg/dL    POC Specific Gravity 1.020 1.005 - 1.030    POC Blood Negative Negative    POC Urine PH 7.0 5.0 - 8.0    POC Protein Negative Negative mg/dL    POC Nitrites Negative Negative    POC Leukocyte Esterase Negative Negative   POC UA    Collection Time: 18 10:22 AM   Result Value Ref Range    POC Color Yellow     POC Appearance Slightly Cloudy (A)     POC Glucose Negative Negative mg/dL    POC Ketones 15 (A) Negative mg/dL    POC Specific Gravity 1.025 1.005 - 1.030    POC Blood Negative Negative    POC Urine PH 5.5 5.0 - 8.0    POC Protein 30 (A) Negative mg/dL    POC Nitrites Negative Negative    POC Leukocyte Esterase Small (A) Negative        Assessment:   Brenda Westbrook at 40w1d  Labor status: Not in labor.  Obstetrical history significant for   Patient Active Problem List    Diagnosis Date Noted   • Endometriosis determined by laparoscopy 2018   • Biological false positive RPR test 2018   • Hx of  section 2018   • Right ovarian cyst - resolved on MRI pelvis 2018   • High-risk pregnancy in third trimester 2018   • Intractable migraine without aura and without status migrainosus 2017   • Asthma, exercise induced    .      Plan:     Admit to L&D  For repeat LTCS. Pt was desiring to TOLAC, however she is past her due date today. Cervix on exam is unfavorable.   Patient and  discussed about the R/I/B/A of the procedure. Risks which include but not limited to bleeding requiring blood transfusion, injury to bowel and bladder and other intraabdominal organs; injury to the  baby during the delivery process.  Risk of infection and wound dehiscence. intraop and postop DVT or PE development which could be fatal.  Questions answered to stated satisfaction and the patient is agreeable to proceed. Signed consents.

## 2018-09-15 NOTE — PROGRESS NOTES
Pt up from L&D via paulina. Assessment complete. VSS. Fundus firm, lochia light. Pt oriented to room, call light, emergency light, skylight, bulb syringe and putting baby on back to sleep. Call light within reach. Will continue to monitor.

## 2018-09-15 NOTE — OP REPORT
Section Operative Note    Date of Operation: 09/15/18    Preoperative Diagnosis:  Term 40 weeks and 1 day, Previous LTCS x 1, desired a TOLAC,                                                Not in labor    Postoperative Diagnosis: same    Principal Procedure: Repeat low vertical  Section via Pfannenstiel                                       Scar Revision    Surgeon: Rachna Patterson MD    Assistant: Joselyn Olivarez MD PGY-1    Anesthesiologist: Eldon Friedman MD/Spinal    Anesthesia: regional    Principal Procedure As Follows:  After adequate regional anesthesia was ascertained, the patient was prepped and draped in a normal supine position with a wedge under the right hip.  A pfannensteil incision was made and the previous scar was removed.  The incision was taken down to the fascia, which was incised medial to lateral.  The rectus muscles were dissected off the rectus sheath.  There was separation of the rectus sheath superiorly and the peritoneum was entered atraumatically, extended superiorly and inferiorly.  The lower uterine segment was identified.  A low transverse incision was made in the uterus.  It was then extended digitally and the membranes were intact with clear fluid.  The infant was born in a vertex position.  Nuchal cord x 2, tight. It was a viable male infant, Apgar 8 and 9, and weight 2545 gm.  Placenta was manually delivered.  The uterus was exteriorized, cleansed of all clots and membranes.  The lower segment was dilated for lochia egress.  Attention was made towards closing the uterus in a 1-layer fashion with 0 Chromic suture ligature in a running interlocking stitch with hemostatis assured.  The gutters were cleansed of all clots.  Tubal ligation was not performed.  The uterus was reduced with normal tubes and ovaries and multiple sheets of Interceed were placed in the peritoneal cavity to prevent additional adhesion formation.  The peritoneum was closed with  running 3-0 Vicryl.  The rectus muscles were inspected, found to be hemostatic, and were closed with 0 Chromic in a simple mattress fashion.  The fascia was closed with 0 Vicryl going right to left, left to right, crossing in the midline with a running interlocking stitch.  The subcutaneous tissues was copiously irrigated.  Small capillary bleeders individually coagulated.  The subcutaneous tissues were approximated with interrupted 3-0 Vicryl and the skin with Insorb. Mepilex dressing placed.  Estimated blood loss 600 mL and sponge and needle count were correct x3.    Rachna Patterson M.D.

## 2018-09-16 LAB
ERYTHROCYTE [DISTWIDTH] IN BLOOD BY AUTOMATED COUNT: 49.1 FL (ref 35.9–50)
HCT VFR BLD AUTO: 30.7 % (ref 37–47)
HGB BLD-MCNC: 9.8 G/DL (ref 12–16)
MCH RBC QN AUTO: 28.3 PG (ref 27–33)
MCHC RBC AUTO-ENTMCNC: 31.9 G/DL (ref 33.6–35)
MCV RBC AUTO: 88.7 FL (ref 81.4–97.8)
PLATELET # BLD AUTO: 163 K/UL (ref 164–446)
PMV BLD AUTO: 10.4 FL (ref 9–12.9)
RBC # BLD AUTO: 3.46 M/UL (ref 4.2–5.4)
WBC # BLD AUTO: 8.3 K/UL (ref 4.8–10.8)

## 2018-09-16 PROCEDURE — 36415 COLL VENOUS BLD VENIPUNCTURE: CPT

## 2018-09-16 PROCEDURE — 700111 HCHG RX REV CODE 636 W/ 250 OVERRIDE (IP): Performed by: OBSTETRICS & GYNECOLOGY

## 2018-09-16 PROCEDURE — A9270 NON-COVERED ITEM OR SERVICE: HCPCS | Performed by: ANESTHESIOLOGY

## 2018-09-16 PROCEDURE — 90686 IIV4 VACC NO PRSV 0.5 ML IM: CPT | Performed by: OBSTETRICS & GYNECOLOGY

## 2018-09-16 PROCEDURE — A9270 NON-COVERED ITEM OR SERVICE: HCPCS | Performed by: OBSTETRICS & GYNECOLOGY

## 2018-09-16 PROCEDURE — 770002 HCHG ROOM/CARE - OB PRIVATE (112)

## 2018-09-16 PROCEDURE — 90471 IMMUNIZATION ADMIN: CPT

## 2018-09-16 PROCEDURE — 700102 HCHG RX REV CODE 250 W/ 637 OVERRIDE(OP): Performed by: ANESTHESIOLOGY

## 2018-09-16 PROCEDURE — 85027 COMPLETE CBC AUTOMATED: CPT

## 2018-09-16 PROCEDURE — 700112 HCHG RX REV CODE 229: Performed by: OBSTETRICS & GYNECOLOGY

## 2018-09-16 PROCEDURE — 700111 HCHG RX REV CODE 636 W/ 250 OVERRIDE (IP): Performed by: ANESTHESIOLOGY

## 2018-09-16 PROCEDURE — 700102 HCHG RX REV CODE 250 W/ 637 OVERRIDE(OP): Performed by: OBSTETRICS & GYNECOLOGY

## 2018-09-16 RX ORDER — OXYCODONE HYDROCHLORIDE AND ACETAMINOPHEN 5; 325 MG/1; MG/1
1 TABLET ORAL EVERY 4 HOURS PRN
Status: DISCONTINUED | OUTPATIENT
Start: 2018-09-16 | End: 2018-09-18 | Stop reason: HOSPADM

## 2018-09-16 RX ORDER — OXYCODONE HYDROCHLORIDE AND ACETAMINOPHEN 5; 325 MG/1; MG/1
2 TABLET ORAL EVERY 4 HOURS PRN
Status: DISCONTINUED | OUTPATIENT
Start: 2018-09-16 | End: 2018-09-18 | Stop reason: HOSPADM

## 2018-09-16 RX ORDER — FERROUS SULFATE 325(65) MG
325 TABLET ORAL
Status: DISCONTINUED | OUTPATIENT
Start: 2018-09-16 | End: 2018-09-18 | Stop reason: HOSPADM

## 2018-09-16 RX ORDER — ONDANSETRON 4 MG/1
4 TABLET, ORALLY DISINTEGRATING ORAL EVERY 6 HOURS PRN
Status: DISCONTINUED | OUTPATIENT
Start: 2018-09-16 | End: 2018-09-18 | Stop reason: HOSPADM

## 2018-09-16 RX ORDER — ONDANSETRON 2 MG/ML
4 INJECTION INTRAMUSCULAR; INTRAVENOUS EVERY 6 HOURS PRN
Status: DISCONTINUED | OUTPATIENT
Start: 2018-09-16 | End: 2018-09-18 | Stop reason: HOSPADM

## 2018-09-16 RX ORDER — IBUPROFEN 600 MG/1
600 TABLET ORAL EVERY 6 HOURS PRN
Status: DISCONTINUED | OUTPATIENT
Start: 2018-09-16 | End: 2018-09-18 | Stop reason: HOSPADM

## 2018-09-16 RX ADMIN — DOCUSATE SODIUM 100 MG: 100 CAPSULE, LIQUID FILLED ORAL at 12:09

## 2018-09-16 RX ADMIN — DIPHENHYDRAMINE HYDROCHLORIDE 25 MG: 50 INJECTION, SOLUTION INTRAMUSCULAR; INTRAVENOUS at 04:33

## 2018-09-16 RX ADMIN — INFLUENZA A VIRUS A/MICHIGAN/45/2015 X-275 (H1N1) ANTIGEN (FORMALDEHYDE INACTIVATED), INFLUENZA A VIRUS A/SINGAPORE/INFIMH-16-0019/2016 IVR-186 (H3N2) ANTIGEN (FORMALDEHYDE INACTIVATED), INFLUENZA B VIRUS B/PHUKET/3073/2013 ANTIGEN (FORMALDEHYDE INACTIVATED), AND INFLUENZA B VIRUS B/MARYLAND/15/2016 BX-69A ANTIGEN (FORMALDEHYDE INACTIVATED) 0.5 ML: 15; 15; 15; 15 INJECTION, SUSPENSION INTRAMUSCULAR at 04:46

## 2018-09-16 RX ADMIN — OXYCODONE HYDROCHLORIDE AND ACETAMINOPHEN 1 TABLET: 5; 325 TABLET ORAL at 06:53

## 2018-09-16 RX ADMIN — FERROUS SULFATE TAB 325 MG (65 MG ELEMENTAL FE) 325 MG: 325 (65 FE) TAB at 12:09

## 2018-09-16 RX ADMIN — OXYCODONE HYDROCHLORIDE AND ACETAMINOPHEN 1 TABLET: 5; 325 TABLET ORAL at 20:00

## 2018-09-16 RX ADMIN — KETOROLAC TROMETHAMINE 30 MG: 30 INJECTION, SOLUTION INTRAMUSCULAR; INTRAVENOUS at 04:34

## 2018-09-16 RX ADMIN — DIPHENHYDRAMINE HYDROCHLORIDE 25 MG: 50 INJECTION, SOLUTION INTRAMUSCULAR; INTRAVENOUS at 12:20

## 2018-09-16 RX ADMIN — FERROUS SULFATE TAB 325 MG (65 MG ELEMENTAL FE) 325 MG: 325 (65 FE) TAB at 20:02

## 2018-09-16 RX ADMIN — KETOROLAC TROMETHAMINE 30 MG: 30 INJECTION, SOLUTION INTRAMUSCULAR; INTRAVENOUS at 12:09

## 2018-09-16 RX ADMIN — IBUPROFEN 600 MG: 600 TABLET, FILM COATED ORAL at 20:00

## 2018-09-16 RX ADMIN — OXYCODONE HYDROCHLORIDE AND ACETAMINOPHEN 2 TABLET: 5; 325 TABLET ORAL at 01:13

## 2018-09-16 RX ADMIN — OXYCODONE HYDROCHLORIDE AND ACETAMINOPHEN 1 TABLET: 5; 325 TABLET ORAL at 15:04

## 2018-09-16 RX ADMIN — FERROUS SULFATE TAB 325 MG (65 MG ELEMENTAL FE) 325 MG: 325 (65 FE) TAB at 07:37

## 2018-09-16 ASSESSMENT — PAIN SCALES - GENERAL
PAINLEVEL_OUTOF10: 0
PAINLEVEL_OUTOF10: 3
PAINLEVEL_OUTOF10: 3
PAINLEVEL_OUTOF10: 0
PAINLEVEL_OUTOF10: 1
PAINLEVEL_OUTOF10: 6
PAINLEVEL_OUTOF10: 4
PAINLEVEL_OUTOF10: 4
PAINLEVEL_OUTOF10: 0
PAINLEVEL_OUTOF10: 7

## 2018-09-16 NOTE — CARE PLAN
Problem: Altered physiologic condition related to postoperative  delivery  Goal: Patient physiologically stable as evidenced by normal lochia, palpable uterine involution and vital signs within normal limits  Outcome: PROGRESSING AS EXPECTED  FF@U with light lochia    Problem: Potential for postpartum infection related to surgical incision, compromised uterine condition, urinary tract or respiratory compromise  Goal: Patient will be afebrile and free from signs and symptoms of infection  No signs of infection at time of assessment

## 2018-09-16 NOTE — PROGRESS NOTES
Post Partum Progress Note    Name:   Brenda Westbrook   Date/Time:  2018 - 7:06 AM  Chief Admitting Dx:  Pregnancy  Repeat   Indication for care in labor or delivery  Previous  section  Delivery Type:   for repeat  Post-Op/Post Partum Days #:  1    Subjective:  Abdominal pain: no  Ambulating:   yes  Tolerating liquids:  yes  Tolerating food:  yes common adult  Flatus:   yes  BM:    no  Bleeding:   without any bleeding  Voiding:   yes  Dizziness:   no  Feeding:   breast    Vitals:    18 0200 18 0300 18 0400 18 0500   BP:   116/75    Pulse: 83 80 71 85   Resp:    Temp:   36.6 °C (97.9 °F)    TempSrc:       SpO2: 94% 94% 95% 94%   Weight:       Height:           Exam:  Breast: Tenderness no  Abdomen: Abdomen soft, non-tender. BS normal. No masses,  No organomegaly  Fundal Tenderness:  no  Fundus Firm: yes  Incision: dry and intact   Below umbilicus: yes  Perineum: perineum intact  Lochia: mild  Extremities: Normal extremities, peripheral pulses and reflexes normal    Meds:  Current Facility-Administered Medications   Medication Dose   • ferrous sulfate tablet 325 mg  325 mg   • oxytocin (PITOCIN) infusion (for postpartum)   mL/hr   • LR infusion     • PRN oxytocin (PITOCIN) (20 Units/1000 mL) PRN for excessive uterine bleeding - See Admin Instr  125-999 mL/hr   • miSOPROStol (CYTOTEC) tablet 800 mcg  800 mcg   • docusate sodium (COLACE) capsule 100 mg  100 mg   • simethicone (MYLICON) chewable tab 80 mg  80 mg   • naloxone (NARCAN) injection 0.1 mg  0.1 mg   • ketorolac (TORADOL) injection 30 mg  30 mg   • oxyCODONE-acetaminophen (PERCOCET) 5-325 MG per tablet 1 Tab  1 Tab   • oxyCODONE-acetaminophen (PERCOCET) 5-325 MG per tablet 2 Tab  2 Tab   • HYDROmorphone (DILAUDID) injection 0.2 mg  0.2 mg   • HYDROmorphone (DILAUDID) injection 0.4 mg  0.4 mg   • ePHEDrine injection 10 mg  10 mg   • ondansetron (ZOFRAN) syringe/vial injection 4 mg  4 mg    • metoclopramide (REGLAN) injection 10 mg  10 mg   • naloxone (NARCAN) 0.4 mg in NS 1,000 mL infusion  0.4 mg   • diphenhydrAMINE (BENADRYL) injection 12.5 mg  12.5 mg   • diphenhydrAMINE (BENADRYL) injection 25 mg  25 mg   • naloxone (NARCAN) 0.4 mg in NS 1,000 mL infusion  0.4 mg       Labs:   Recent Labs      09/15/18   1230  18   0006   WBC  7.4  8.3   RBC  4.09*  3.46*   HEMOGLOBIN  11.7*  9.8*   HEMATOCRIT  36.4*  30.7*   MCV  89.0  88.7   MCH  28.6  28.3   MCHC  32.1*  31.9*   RDW  50.2*  49.1   PLATELETCT  222  163*   MPV  10.5  10.4       Assessment:  Chief Admitting Dx:  Pregnancy  Repeat   Indication for care in labor or delivery  Previous  section  Delivery Type:   for repeat POD 1  Tubal Ligation:  no    Plan:  Continue routine post partum care.  Ambulate  Continue to breast feed  Ferrous sulfate TID    Rachna Patterson M.D.

## 2018-09-16 NOTE — CONSULTS
Mother states she BF her older problem without problem, states this baby BF well, denies pain and/or need for assistance with BF, states understanding of expected feeding frequency and duration, discussed outpatient assistance available at [facility] after discharge, encouraged to call for assistance as needed.

## 2018-09-16 NOTE — PROGRESS NOTES
Mother states she BF her older problem without problem, states this baby BF well, denies pain and/or need for assistance with BF, states understanding of expected feeding frequency and duration, discussed outpatient assistance available at [facility] after discharge, encouraged to call for assistance as needed.

## 2018-09-16 NOTE — CARE PLAN
Problem: Pain Management  Goal: Pain level will decrease to patient's comfort goal  Outcome: PROGRESSING AS EXPECTED  Patient taking scheduled toradol, she calls for PRN pain meds as needed.     Problem: Altered physiologic condition related to postoperative  delivery  Goal: Patient physiologically stable as evidenced by normal lochia, palpable uterine involution and vital signs within normal limits  Outcome: PROGRESSING AS EXPECTED  Patient ambulated to bathroom with hand held assistance, Taking adequate PO fluids and having adequate urine output. Breastfeeding infant successfully. All questions and concerns answered.

## 2018-09-16 NOTE — CARE PLAN
Problem: Altered physiologic condition related to postoperative  delivery  Goal: Patient physiologically stable as evidenced by normal lochia, palpable uterine involution and vital signs within normal limits  Outcome: PROGRESSING AS EXPECTED  VSS. Fundus firm, lochia light.    Problem: Potential for postpartum infection related to surgical incision, compromised uterine condition, urinary tract or respiratory compromise  Goal: Patient will be afebrile and free from signs and symptoms of infection  Outcome: PROGRESSING AS EXPECTED  Pt. Afebrile, VSS. No s/s of infection

## 2018-09-17 PROCEDURE — A9270 NON-COVERED ITEM OR SERVICE: HCPCS | Performed by: OBSTETRICS & GYNECOLOGY

## 2018-09-17 PROCEDURE — 770002 HCHG ROOM/CARE - OB PRIVATE (112)

## 2018-09-17 PROCEDURE — 700112 HCHG RX REV CODE 229: Performed by: OBSTETRICS & GYNECOLOGY

## 2018-09-17 PROCEDURE — 302131 K PAD MOTOR: Performed by: OBSTETRICS & GYNECOLOGY

## 2018-09-17 PROCEDURE — 700102 HCHG RX REV CODE 250 W/ 637 OVERRIDE(OP): Performed by: OBSTETRICS & GYNECOLOGY

## 2018-09-17 PROCEDURE — 302151 K-PAD 3X20: Performed by: OBSTETRICS & GYNECOLOGY

## 2018-09-17 RX ADMIN — OXYCODONE HYDROCHLORIDE AND ACETAMINOPHEN 2 TABLET: 5; 325 TABLET ORAL at 20:03

## 2018-09-17 RX ADMIN — FERROUS SULFATE TAB 325 MG (65 MG ELEMENTAL FE) 325 MG: 325 (65 FE) TAB at 17:14

## 2018-09-17 RX ADMIN — IBUPROFEN 600 MG: 600 TABLET, FILM COATED ORAL at 02:12

## 2018-09-17 RX ADMIN — DOCUSATE SODIUM 100 MG: 100 CAPSULE, LIQUID FILLED ORAL at 20:03

## 2018-09-17 RX ADMIN — IBUPROFEN 600 MG: 600 TABLET, FILM COATED ORAL at 10:37

## 2018-09-17 RX ADMIN — OXYCODONE HYDROCHLORIDE AND ACETAMINOPHEN 1 TABLET: 5; 325 TABLET ORAL at 16:09

## 2018-09-17 RX ADMIN — OXYCODONE HYDROCHLORIDE AND ACETAMINOPHEN 1 TABLET: 5; 325 TABLET ORAL at 06:15

## 2018-09-17 RX ADMIN — OXYCODONE HYDROCHLORIDE AND ACETAMINOPHEN 1 TABLET: 5; 325 TABLET ORAL at 02:13

## 2018-09-17 RX ADMIN — DOCUSATE SODIUM 100 MG: 100 CAPSULE, LIQUID FILLED ORAL at 08:01

## 2018-09-17 RX ADMIN — OXYCODONE HYDROCHLORIDE AND ACETAMINOPHEN 2 TABLET: 5; 325 TABLET ORAL at 12:01

## 2018-09-17 RX ADMIN — FERROUS SULFATE TAB 325 MG (65 MG ELEMENTAL FE) 325 MG: 325 (65 FE) TAB at 08:01

## 2018-09-17 RX ADMIN — FERROUS SULFATE TAB 325 MG (65 MG ELEMENTAL FE) 325 MG: 325 (65 FE) TAB at 12:01

## 2018-09-17 RX ADMIN — IBUPROFEN 600 MG: 600 TABLET, FILM COATED ORAL at 23:39

## 2018-09-17 RX ADMIN — IBUPROFEN 600 MG: 600 TABLET, FILM COATED ORAL at 17:14

## 2018-09-17 RX ADMIN — SIMETHICONE 80 MG: 80 TABLET, CHEWABLE ORAL at 19:31

## 2018-09-17 ASSESSMENT — PAIN SCALES - GENERAL
PAINLEVEL_OUTOF10: 0
PAINLEVEL_OUTOF10: 2
PAINLEVEL_OUTOF10: 0
PAINLEVEL_OUTOF10: 3
PAINLEVEL_OUTOF10: 4
PAINLEVEL_OUTOF10: 3
PAINLEVEL_OUTOF10: 4
PAINLEVEL_OUTOF10: 7
PAINLEVEL_OUTOF10: 7
PAINLEVEL_OUTOF10: 2
PAINLEVEL_OUTOF10: 4
PAINLEVEL_OUTOF10: 0

## 2018-09-17 NOTE — PROGRESS NOTES
Assessment complete. Fundus firm, lochia light. VSS. Tolerating diet. Voiding without difficulty. Incision dressing CDI. Pt states passing gas. Pain controlled with prn medications per mar. Will offer pain medications as they become available. FOB at bedside, bonding with baby. POC discussed with pt. Encouraged to call with needs. Call light in place.

## 2018-09-17 NOTE — CARE PLAN
Problem: Pain Management  Goal: Pain level will decrease to patient's comfort goal  Outcome: PROGRESSING AS EXPECTED  Patient likes to call for PRN pain medication as needed.     Problem: Altered physiologic condition related to postoperative  delivery  Goal: Patient physiologically stable as evidenced by normal lochia, palpable uterine involution and vital signs within normal limits  Outcome: PROGRESSING AS EXPECTED  Patient ambulating, taking adequate PO fluids and voiding. Breastfeeding infant. Incision site looks Within normal limits no drainage noted. All questions and concerns answered.

## 2018-09-17 NOTE — PROGRESS NOTES
Post Partum Progress Note    Name:   Brenda Westbrook   Date/Time:  2018 - 7:09 AM  Chief Admitting Dx:  Pregnancy  Repeat   Indication for care in labor or delivery  Previous  section  Delivery Type:   for repeat  Post-Op/Post Partum Days #:  2    Subjective:  Abdominal pain: no  Ambulating:   yes  Tolerating liquids:  yes  Tolerating food:  yes common adult  Flatus:   yes  BM:    no  Bleeding:   with a small amount of bleeding  Voiding:   yes  Dizziness:   no  Feeding:   breast    Vitals:    18 0500 18 0800 18 1200 18   BP:  110/68 114/68 117/72   Pulse: 85 71 89 96   Resp:    Temp:  36.5 °C (97.7 °F) 37.1 °C (98.7 °F) 37 °C (98.6 °F)   TempSrc:       SpO2: 94% 97% 98% 99%   Weight:       Height:           Exam:  Breast: Lactating yes  Abdomen: Abdomen soft, non-tender. BS normal. No masses,  No organomegaly  Fundal Tenderness:  no  Fundus Firm: yes  Incision:dressing dry and intact  Below umbilicus: yes  Perineum: perineum intact  Lochia: mild  Extremities: no edema extremities, peripheral pulses and reflexes normal, Homans sign is negative, no sign of DVT    Meds:  Current Facility-Administered Medications   Medication Dose   • ibuprofen (MOTRIN) tablet 600 mg  600 mg   • oxyCODONE-acetaminophen (PERCOCET) 5-325 MG per tablet 1 Tab  1 Tab   • oxyCODONE-acetaminophen (PERCOCET) 5-325 MG per tablet 2 Tab  2 Tab   • ondansetron (ZOFRAN) syringe/vial injection 4 mg  4 mg    Or   • ondansetron (ZOFRAN ODT) dispertab 4 mg  4 mg   • ferrous sulfate tablet 325 mg  325 mg   • oxytocin (PITOCIN) infusion (for postpartum)   mL/hr   • LR infusion     • PRN oxytocin (PITOCIN) (20 Units/1000 mL) PRN for excessive uterine bleeding - See Admin Instr  125-999 mL/hr   • miSOPROStol (CYTOTEC) tablet 800 mcg  800 mcg   • docusate sodium (COLACE) capsule 100 mg  100 mg   • simethicone (MYLICON) chewable tab 80 mg  80 mg       Labs:   Recent Labs       09/15/18   1230  18   0006   WBC  7.4  8.3   RBC  4.09*  3.46*   HEMOGLOBIN  11.7*  9.8*   HEMATOCRIT  36.4*  30.7*   MCV  89.0  88.7   MCH  28.6  28.3   MCHC  32.1*  31.9*   RDW  50.2*  49.1   PLATELETCT  222  163*   MPV  10.5  10.4       Assessment:  Chief Admitting Dx:  Pregnancy  Repeat   Indication for care in labor or delivery  Previous  section  Delivery Type:   for repeat  Tubal Ligation:  no    Plan:  Continue routine post partum care.  Anticipate discharge tomorrow.    Kelly Hicks M.D.

## 2018-09-18 VITALS
TEMPERATURE: 98.6 F | BODY MASS INDEX: 26.58 KG/M2 | RESPIRATION RATE: 18 BRPM | OXYGEN SATURATION: 97 % | HEIGHT: 63 IN | WEIGHT: 150 LBS | DIASTOLIC BLOOD PRESSURE: 71 MMHG | SYSTOLIC BLOOD PRESSURE: 112 MMHG | HEART RATE: 81 BPM

## 2018-09-18 PROBLEM — Z98.891 S/P CESAREAN SECTION: Status: ACTIVE | Noted: 2018-09-18

## 2018-09-18 PROCEDURE — A9270 NON-COVERED ITEM OR SERVICE: HCPCS | Performed by: OBSTETRICS & GYNECOLOGY

## 2018-09-18 PROCEDURE — 700102 HCHG RX REV CODE 250 W/ 637 OVERRIDE(OP): Performed by: OBSTETRICS & GYNECOLOGY

## 2018-09-18 RX ORDER — FERROUS SULFATE 325(65) MG
325 TABLET ORAL DAILY
Qty: 30 TAB | Refills: 0 | Status: SHIPPED | OUTPATIENT
Start: 2018-09-18 | End: 2018-10-09

## 2018-09-18 RX ORDER — OXYCODONE HYDROCHLORIDE AND ACETAMINOPHEN 5; 325 MG/1; MG/1
1 TABLET ORAL EVERY 4 HOURS PRN
Qty: 20 TAB | Refills: 0 | Status: SHIPPED | OUTPATIENT
Start: 2018-09-18 | End: 2018-09-25

## 2018-09-18 RX ORDER — IBUPROFEN 600 MG/1
600 TABLET ORAL EVERY 6 HOURS PRN
Qty: 30 TAB | Refills: 0 | Status: SHIPPED | OUTPATIENT
Start: 2018-09-18 | End: 2019-07-16

## 2018-09-18 RX ORDER — PSEUDOEPHEDRINE HCL 30 MG
100 TABLET ORAL 2 TIMES DAILY PRN
Qty: 60 CAP | Refills: 0 | Status: SHIPPED | OUTPATIENT
Start: 2018-09-18 | End: 2018-10-09

## 2018-09-18 RX ADMIN — FERROUS SULFATE TAB 325 MG (65 MG ELEMENTAL FE) 325 MG: 325 (65 FE) TAB at 08:44

## 2018-09-18 RX ADMIN — OXYCODONE HYDROCHLORIDE AND ACETAMINOPHEN 1 TABLET: 5; 325 TABLET ORAL at 02:30

## 2018-09-18 RX ADMIN — IBUPROFEN 600 MG: 600 TABLET, FILM COATED ORAL at 05:39

## 2018-09-18 RX ADMIN — IBUPROFEN 600 MG: 600 TABLET, FILM COATED ORAL at 11:43

## 2018-09-18 RX ADMIN — FERROUS SULFATE TAB 325 MG (65 MG ELEMENTAL FE) 325 MG: 325 (65 FE) TAB at 11:43

## 2018-09-18 ASSESSMENT — PAIN SCALES - GENERAL
PAINLEVEL_OUTOF10: 0
PAINLEVEL_OUTOF10: 7
PAINLEVEL_OUTOF10: 4
PAINLEVEL_OUTOF10: 0

## 2018-09-18 NOTE — PROGRESS NOTES
Patient discharged to home with family. All discharge instructions and prescriptions given to and explained to patient. Patient verbalizes understanding. Signed copy of discharge instructions in patients chart. All personal belongings sent home with patient. Patient taken out in wheel chair by hospital volunteer.

## 2018-09-18 NOTE — CARE PLAN
Problem: Altered physiologic condition related to postoperative  delivery  Goal: Patient physiologically stable as evidenced by normal lochia, palpable uterine involution and vital signs within normal limits  Outcome: PROGRESSING AS EXPECTED  Fundus firm @ U, lochia rubra minimal. Surgical incision with mepilex dressing CDI. V/S within normal limits.    Problem: Potential knowledge deficit related to lack of understanding of self and  care  Goal: Patient will demonstrate ability to care for self and infant  Outcome: PROGRESSING AS EXPECTED  Patient is able to care for self and infant.

## 2018-09-18 NOTE — DISCHARGE SUMMARY
Discharge Summary:      Brenda Westbrook      Admit Date:   9/15/2018  Discharge Date:  2018     Admitting diagnosis:    Pregnancy @ 40w1d  Hx of c/s x1    Discharge Diagnosis: Status post repeat  delivery.  Pregnancy Complications: none  Tubal Ligation:  no        History:  Past Medical History:   Diagnosis Date   • Asthma, exercise induced    • Bronchitis    • Chlamydia     Oct 2011   • Cold     02/6/15   • Endometriosis    • Head ache    • Indigestion    • Intractable migraine without aura and without status migrainosus 2017    Referral to neuro   • Other specified symptom associated with female genital organs     endometriosis   • Pain     pelvic   • Pain 12    stomach   • Psychiatric problem     depression   • Sleep disturbances    • Unspecified disorder of thyroid 2015    hyperactive in the past not taking any meds currently   • Urinary bladder disorder     frequent uti's     OB History    Para Term  AB Living   2 1 1 0 0 1   SAB TAB Ectopic Molar Multiple Live Births   0 0 0     1      # Outcome Date GA Lbr Bhupinder/2nd Weight Sex Delivery Anes PTL Lv   2 Current            1 Term 16 41w0d  3.09 kg (6 lb 13 oz) M CS-LTranv Spinal N MARYSOL      Complications: Fetal Intolerance      Birth Comments: double nuchal cord, NRFS           Ciprofloxacin; Ciprofloxacin; Codeine; Codeine; Doxycycline; Doxycycline; Keflex; and Keflex  Patient Active Problem List    Diagnosis Date Noted   • S/P  section 2018   • Endometriosis determined by laparoscopy 2018   • Biological false positive RPR test 2018   • Hx of  section 2018   • Right ovarian cyst - resolved on MRI pelvis 2018   • High-risk pregnancy in third trimester 2018   • Intractable migraine without aura and without status migrainosus 2017   • Asthma, exercise induced         Hospital Course:   23 y.o. , now para , was admitted for scheduled repeat   delivery.  She underwent uncomplicated repeat c/s with EBL 600cc.  Baby had baby boy, wt 2545g and APGARs 8/9.  Patient's postpartum course was unremarkable, with progressive advancement in diet , ambulation and toleration of oral analgesia. Patient without complaints today and desires discharge, is breastfeeding.  Declines interim contraception and is planning on mirena for contraception.      Vitals:    18 1200 18 2000 18 0800 18   BP: 114/68 117/72 110/72 105/71   Pulse: 89 96 64 82   Resp:    Temp: 37.1 °C (98.7 °F) 37 °C (98.6 °F) 36.7 °C (98.1 °F) 36.9 °C (98.4 °F)   TempSrc:       SpO2: 98% 99% 95% 93%   Weight:       Height:           Current Facility-Administered Medications   Medication Dose   • ibuprofen (MOTRIN) tablet 600 mg  600 mg   • oxyCODONE-acetaminophen (PERCOCET) 5-325 MG per tablet 1 Tab  1 Tab   • oxyCODONE-acetaminophen (PERCOCET) 5-325 MG per tablet 2 Tab  2 Tab   • ondansetron (ZOFRAN) syringe/vial injection 4 mg  4 mg    Or   • ondansetron (ZOFRAN ODT) dispertab 4 mg  4 mg   • ferrous sulfate tablet 325 mg  325 mg   • oxytocin (PITOCIN) infusion (for postpartum)   mL/hr   • LR infusion     • PRN oxytocin (PITOCIN) (20 Units/1000 mL) PRN for excessive uterine bleeding - See Admin Instr  125-999 mL/hr   • miSOPROStol (CYTOTEC) tablet 800 mcg  800 mcg   • docusate sodium (COLACE) capsule 100 mg  100 mg   • simethicone (MYLICON) chewable tab 80 mg  80 mg       Exam:  Gen: AAO, NAD  Resp: ctab  CV: RRR  Abdomen: Abdomen soft, non-tender, ND, +BS, fundus firm below umbilicus   Incision w/ bandage in place C/D/I  Extremity: NT, no edema   Labs:  Recent Labs      09/15/18   1230  18   0006   WBC  7.4  8.3   RBC  4.09*  3.46*   HEMOGLOBIN  11.7*  9.8*   HEMATOCRIT  36.4*  30.7*   MCV  89.0  88.7   MCH  28.6  28.3   MCHC  32.1*  31.9*   RDW  50.2*  49.1   PLATELETCT  222  163*   MPV  10.5  10.4        Activity:   Discharge to home  Pelvic  Rest x 6 weeks  No driving while taking pain medication  No heavy lifting until seen in office    Assessment:  normal postpartum course     Follow up: .TPC 1 week for incision check, approx 5wks for routine postpartum.     Discharge Meds:   Current Outpatient Prescriptions   Medication Sig Dispense Refill   • oxyCODONE-acetaminophen (PERCOCET) 5-325 MG Tab Take 1 Tab by mouth every four hours as needed for Moderate Pain for up to 7 days. 20 Tab 0   • ibuprofen (MOTRIN) 600 MG Tab Take 1 Tab by mouth every 6 hours as needed (For cramping after delivery; do not give if patient is receiving ketorolac (Toradol)). 30 Tab 0   • docusate sodium 100 MG Cap Take 100 mg by mouth 2 times a day as needed for Constipation. 60 Cap 0   • ferrous sulfate 325 (65 Fe) MG tablet Take 1 Tab by mouth every day. 30 Tab 0       Yuliana Rogers M.D.

## 2018-09-18 NOTE — DISCHARGE INSTRUCTIONS
POSTPARTUM DISCHARGE INSTRUCTIONS FOR MOM    YOB: 1995   Age: 23 y.o.               Admit Date: 9/15/2018     Discharge Date: 2018  Attending Doctor:  Rachna Rodriguez*                  Allergies:  Ciprofloxacin; Ciprofloxacin; Codeine; Codeine; Doxycycline; Doxycycline; Keflex; and Keflex    Discharged to home by car. Discharged via wheelchair, hospital escort: Yes.  Special equipment needed: Not Applicable  Belongings with: Personal  Be sure to schedule a follow-up appointment with your primary care doctor or any specialists as instructed.     Discharge Plan:   Influenza Vaccine Indication: Indicated: 9 to 64 years of age  Influenza Vaccine Given - only chart on this line when given: Influenza Vaccine Given (See MAR)    REASONS TO CALL YOUR OBSTETRICIAN:  1.   Persistent fever or shaking chills (Temperature higher than 100.4)  2.   Heavy bleeding (soaking more than 1 pad per hour); Passing clots  3.   Foul odor from vagina  4.   Mastitis (Breast infection; breast pain, chills, fever, redness)  5.   Urinary pain, burning or frequency  6.   Episiotomy infection  7.   Abdominal incision infection  8.   Severe depression longer than 24 hours    HAND WASHING  · Prior to handling the baby.  · Before breastfeeding or bottle feeding baby.  · After using the bathroom or changing the baby's diaper.    WOUND CARE  Ask your physician for additional care instructions.  In general:    ·  Incision:      · Keep clean and dry.    · Do NOT lift anything heavier than your baby for up to 6 weeks.    · There should not be any opening or pus.      VAGINAL CARE  · Nothing inside vagina for 6 weeks: no sexual intercourse, tampons or douching.  · Bleeding may continue for 2-4 weeks.  Amount may vary.    · Call your physician for heavy bleeding which means soaking more than 1 pad per hour    BIRTH CONTROL  · It is possible to become pregnant at any time after delivery and while breastfeeding.  · Plan to  "discuss a method of birth control with your physician at your follow up visit. visit.    DIET AND ELIMINATION  · Eating more fiber (bran cereal, fruits, and vegetables) and drinking plenty of fluids will help to avoid constipation.  · Urinary frequency after childbirth is normal.    POSTPARTUM BLUES  During the first few days after birth, you may experience a sense of the \"blues\" which may include impatience, irritability or even crying.  These feeling come and go quickly.  However, as many as 1 in 10 women experience emotional symptoms known as postpartum depression.    Postpartum depression:  May start as early as the second or third day after delivery or take several weeks or months to develop.  Symptoms of \"blues\" are present, but are more intense:  Crying spells; loss of appetite; feelings of hopelessness or loss of control; fear of touching the baby; over concern or no concern at all about the baby; little or no concern about your own appearance/caring for yourself; and/or inability to sleep or excessive sleeping.  Contact your physician if you are experiencing any of these symptoms.    Crisis Hotline:  · [place] Crisis Hotline:  2-870-WPOJAQV  Or 1-329.331.9371  · [place] Crisis Hotline:  1-196.677.3720  Or 728-847-5415    PREVENTING SHAKEN BABY:  If you are angry or stressed, PUT THE BABY IN THE CRIB, step away, take some deep breaths, and wait until you are calm to care for the baby.  DO NOT SHAKE THE BABY.  You are not alone, call a supporter for help.    · Crisis Call Center 24/7 crisis line 642-632-1053 or 1-806.169.6542  · You can also text them, text \"ANSWER\" to 195113    QUIT SMOKING/TOBACCO USE:  I understand the use of any tobacco products increases my chance of suffering from future heart disease and could cause other illnesses which may shorten my life. Quitting the use of tobacco products is the single most important thing I can do to improve my health. For further information on smoking / " tobacco cessation call a Toll Free Quit Line at 1-917.218.6827 (*[employer] [place]) or 1-534.896.2298 (American Lung Association) or you can access the web based program at www.lungusa.org.    · [place] Tobacco Users Help Line:  (843) 472-6837       Toll Free: 1-596.291.9697  · Quit Tobacco Program [facility] [place], operated by [facility] (643)175-1409    DEPRESSION / SUICIDE RISK:  As you are discharged from this [facility], it is important to learn how to keep safe from harming yourself.    Recognize the warning signs:  · Abrupt changes in personality, positive or negative- including increase in energy   · Giving away possessions  · Change in eating patterns- significant weight changes-  positive or negative  · Change in sleeping patterns- unable to sleep or sleeping all the time   · Unwillingness or inability to communicate  · Depression  · Unusual sadness, discouragement and loneliness  · Talk of wanting to die  · Neglect of personal appearance   · Rebelliousness- reckless behavior  · Withdrawal from people/activities they love  · Confusion- inability to concentrate     If you or a loved one observes any of these behaviors or has concerns about self-harm, here's what you can do:  · Talk about it- your feelings and reasons for harming yourself  · Remove any means that you might use to hurt yourself (examples: pills, rope, extension cords, firearm)  · Get professional help from the community (Mental Health, Substance Abuse, psychological counseling)  · Do not be alone:Call your Safe Contact- someone whom you trust who will be there for you.  · Call your local CRISIS HOTLINE 920-1023 or 619-063-3278  · Call your local Children's Mobile Crisis Response Team [place] (271) 423-8690 or www.DVTel  · Call the toll free National Suicide Prevention Hotlines   · National Suicide Prevention Lifeline 580-826-DAVO (6095)  · National Hope Line Network 800-SUICIDE (175-1189)    DISCHARGE  SURVEY:  Thank you for choosing [facility].  We hope we provided you with very good care.  You may be receiving a survey in the mail.  Please fill it out.  Your opinion is valuable to us.    ADDITIONAL EDUCATIONAL MATERIALS GIVEN TO PATIENT:        My signature on this form indicates that:  1.  I have reviewed and understand the above information  2.  My questions regarding this information have been answered to my satisfaction.  3.  I have formulated a plan with my discharge nurse to obtain my prescribed medication for home.

## 2018-10-04 ENCOUNTER — POST PARTUM (OUTPATIENT)
Dept: OBGYN | Facility: CLINIC | Age: 23
End: 2018-10-04
Payer: COMMERCIAL

## 2018-10-04 VITALS — DIASTOLIC BLOOD PRESSURE: 60 MMHG | SYSTOLIC BLOOD PRESSURE: 100 MMHG | WEIGHT: 132 LBS | BODY MASS INDEX: 23.39 KG/M2

## 2018-10-04 DIAGNOSIS — Z09 POSTOP CHECK: ICD-10-CM

## 2018-10-04 PROCEDURE — 99024 POSTOP FOLLOW-UP VISIT: CPT | Performed by: OBSTETRICS & GYNECOLOGY

## 2018-10-04 NOTE — PROGRESS NOTES
Pt. Here today for C/S check delivered on 9/15/18 by Dr. Marcelle Briscoe  Currently : breast and bottle feeding  Pt. States no complaints

## 2018-10-09 ENCOUNTER — OFFICE VISIT (OUTPATIENT)
Dept: NEUROLOGY | Facility: MEDICAL CENTER | Age: 23
End: 2018-10-09
Payer: COMMERCIAL

## 2018-10-09 VITALS
BODY MASS INDEX: 23.8 KG/M2 | SYSTOLIC BLOOD PRESSURE: 112 MMHG | RESPIRATION RATE: 16 BRPM | TEMPERATURE: 98.2 F | DIASTOLIC BLOOD PRESSURE: 58 MMHG | OXYGEN SATURATION: 96 % | HEIGHT: 63 IN | WEIGHT: 134.3 LBS | HEART RATE: 80 BPM

## 2018-10-09 DIAGNOSIS — G43.019 INTRACTABLE MIGRAINE WITHOUT AURA AND WITHOUT STATUS MIGRAINOSUS: Primary | ICD-10-CM

## 2018-10-09 PROCEDURE — 99213 OFFICE O/P EST LOW 20 MIN: CPT | Performed by: PSYCHIATRY & NEUROLOGY

## 2018-10-09 RX ORDER — SUMATRIPTAN AND NAPROXEN SODIUM 85; 500 MG/1; MG/1
TABLET, FILM COATED ORAL
Qty: 9 TAB | Refills: 6 | Status: SHIPPED | OUTPATIENT
Start: 2018-10-09 | End: 2018-10-18

## 2018-10-09 RX ORDER — PROPRANOLOL HYDROCHLORIDE 120 MG/1
120 CAPSULE, EXTENDED RELEASE ORAL DAILY
Qty: 30 CAP | Refills: 4 | Status: SHIPPED | OUTPATIENT
Start: 2018-10-09 | End: 2019-07-16 | Stop reason: SDUPTHER

## 2018-10-09 ASSESSMENT — ENCOUNTER SYMPTOMS
MEMORY LOSS: 0
PHOTOPHOBIA: 1
HEADACHES: 1
CONSTIPATION: 0

## 2018-10-09 ASSESSMENT — PAIN SCALES - GENERAL: PAINLEVEL: 4=SLIGHT-MODERATE PAIN

## 2018-10-09 NOTE — PROGRESS NOTES
"Subjective:      Brenda Westbrook is a 23 y.o. female who presents with her  Doni, for follow-up, now status post delivery of a very healthy young boy, with a history of migraine without aura.    HPI    When last seen, at approximately 23 week gestation, headaches had begun to increase, we got her back on Inderal  mg daily, the headaches responded nicely. After her delivery on this September 15 past, she was told to not take any of the medications she had been using, and thus her headaches have increased once again. She was also told not to take any of her rescue medications including her Fioricet.    Needless to say, she has had a near daily moderate to severe headache ever since. There was nothing unusual about the nature of the headaches, simply their intensity and duration off medication. Her little one is sleeping throughout most of the day, he has put on quite a few pounds, is otherwise healthy. As a result of his sleeping hygiene, her sleep hygiene has suffered significantly.    Medical, surgical and family histories are reviewed in electronic health record, there are no new drug allergies. She is taking only prenatal vitamins.    Review of Systems   Constitutional: Positive for malaise/fatigue.   Eyes: Positive for photophobia.   Cardiovascular: Negative for leg swelling.   Gastrointestinal: Negative for constipation.   Neurological: Positive for headaches.   Psychiatric/Behavioral: Negative for memory loss.   All other systems reviewed and are negative.       Objective:     /58 (BP Location: Right arm, Patient Position: Sitting)   Pulse 80   Temp 36.8 °C (98.2 °F) (Temporal)   Resp 16   Ht 1.6 m (5' 3\")   Wt 60.9 kg (134 lb 4.8 oz)   LMP 12/08/2017   SpO2 96%   BMI 23.79 kg/m²      Physical Exam    She appears in some mild distress related to the headache though there is no significant photophobia. She is still quite cooperative and very pleasant in severity and demeanor. " Vital signs are stable. There is no malar rash. There is no jaw claudication. The neck is supple without meningismus. Cardiac evaluation reveals a regular rhythm. There is no lower extremity edema.    Including mental status, cranial nerves, motor, reflexes, coordination, and sensory evaluations, there is no evidence of deficit or toxicity.     Assessment/Plan:     1. Intractable migraine without aura and without status migrainosus  I reviewed the pharmacologic literature in regards to beta blockers and lactation risk, Inderal enters into breast milk at a less than 1% of the adult dose, the risk is small, but I suspect nothing of significance. We talked about this, she is comfortable about getting back on the Inderal  mg daily dosing regimen. She knows the side effects to watch for. When comes to her previous rescue, Treximet, she was told that simply pumping breast milk if she uses the drug within 1-2 hours of breast-feeding, would be adequate to rid the drug from the breast milk and she can breast feed safely.    I will make sure that her pediatrician, Dr. Asencio, is aware of all of this. Hopefully he will not have a major problem with it. She can follow-up with me otherwise in 6 months. Phone contact in the interim depending on need, especially if use of Inderal needs to be changed.    - propranolol CR (INDERAL LA) 120 MG CAPSULE SR 24 HR; Take 1 Cap by mouth every day.  Dispense: 30 Cap; Refill: 4  - Sumatriptan-Naproxen Sodium (TREXIMET)  MG Tab; 1 tab at headache onset; repeat in 1 hour prn  Dispense: 9 Tab; Refill: 6    Time: 20% of the time spent face-to-face for exam, review, discussion, and education, of this over 50% of the time spent counseling and coordinating care.

## 2018-10-09 NOTE — Clinical Note
Jose, just an FYI. Now that she has delivered, I would like to get her back on Inderal  mg daily to help with her migraines, the headaches have taken off since she delivered and the drug is been held. It does not look that the pregnancy risk is significant with this drug. I told her to start taking it but that it would also let you know. Treximet is safe to use, patient's or simply told to pump and dump if they use the drug within 2 hours of breast-feeding. I hope you're comfortable with these recommendations. Let me know if you were not. Juni

## 2018-10-18 ENCOUNTER — TELEPHONE (OUTPATIENT)
Dept: NEUROLOGY | Facility: MEDICAL CENTER | Age: 23
End: 2018-10-18

## 2018-10-18 DIAGNOSIS — G43.019 INTRACTABLE MIGRAINE WITHOUT AURA AND WITHOUT STATUS MIGRAINOSUS: ICD-10-CM

## 2018-10-18 RX ORDER — SUMATRIPTAN 100 MG/1
TABLET, FILM COATED ORAL
Qty: 9 TAB | Refills: 6 | Status: SHIPPED | OUTPATIENT
Start: 2018-10-18 | End: 2019-07-16 | Stop reason: SDUPTHER

## 2018-10-18 RX ORDER — NAPROXEN SODIUM 550 MG/1
TABLET ORAL
Qty: 45 TAB | Refills: 1 | Status: SHIPPED | OUTPATIENT
Start: 2018-10-18 | End: 2019-07-16 | Stop reason: SDUPTHER

## 2018-10-18 NOTE — TELEPHONE ENCOUNTER
Copay for Treximet is too expensive for patient. Can you please prescribe separate Imitrex and naproxen prescriptions?

## 2018-10-31 ENCOUNTER — POST PARTUM (OUTPATIENT)
Dept: OBGYN | Facility: CLINIC | Age: 23
End: 2018-10-31
Payer: COMMERCIAL

## 2018-10-31 VITALS — BODY MASS INDEX: 23.74 KG/M2 | WEIGHT: 134 LBS | DIASTOLIC BLOOD PRESSURE: 70 MMHG | SYSTOLIC BLOOD PRESSURE: 110 MMHG

## 2018-10-31 PROCEDURE — 90050 PR POSTPARTUM VISIT: CPT | Performed by: OBSTETRICS & GYNECOLOGY

## 2018-10-31 NOTE — PROGRESS NOTES
Pt here today for postpartum exam.  Operation Date: 09/15/2018  Currently: Breast feeding   BCM[facility], [facility]   Good ph:[phone]  Pt states that she had has really sharp pain above incision for about 3 weeks   Chaperone offered and accepted.

## 2018-10-31 NOTE — PROGRESS NOTES
Postpartum;    Brenda Westbrook is 23 y.o. female  status post  section, doing well today. Currently nursing without difficulty    Physical examination;    Breast examination-no dominant masses, no skin retraction, no axillary adenopathy, no evidence of mastitis    Pelvic examination;  External genitalia-no visible lesions  Vagina-no discharge or blood  Cervix-no gross lesions  Uterus-normal size and shape, nontender  Adnexa without mass or tenderness     Abdomen-nondistended positive bowel sounds soft nontender without masses or hepatosplenomegaly      Impression;  Normal postpartum    Plan;  Birth control patient elects to have Mirena IUD after extensive counseling

## 2018-12-07 ENCOUNTER — TELEPHONE (OUTPATIENT)
Dept: OBGYN | Facility: CLINIC | Age: 23
End: 2018-12-07

## 2018-12-07 NOTE — TELEPHONE ENCOUNTER
----- Message from Brenda Westbrook sent at 12/6/2018  8:38 AM PST -----  Regarding: Non-Urgent Medical Question  Contact: [phone]  Dr. Alvarenga has received my Mirena, however I was informed I wouldn't be able to get it put in until the end of January at the earliest. I was also told that I couldn't get a different form of birth control until then. With that being said, I called Dr. Flores and my insurance company and they both said that the IUD belongs to me and I can pick it up and take it to Dr. Flores who can have it put in within the next two weeks. I would like to do that, and I told the person who called me to set everything up and she was supposed to have someone call me. If I could just get a good time for when I can pick it up that would be great. I can't wait 2 months for birth control. Please let me know, thanks.        12/7/18 1404 called pt back and offered her to see Dr. Calero (Dr. Calero agreed) on 12/11/18 to have her place her IUD since Dr. Alvarenga does not have anything until January 2019. Pt agreed. Pt scheduled for 12/11/18 at 1300 for IUD placement with Dr. Calero. Pt agreed.

## 2018-12-11 ENCOUNTER — GYNECOLOGY VISIT (OUTPATIENT)
Dept: OBGYN | Facility: CLINIC | Age: 23
End: 2018-12-11
Payer: COMMERCIAL

## 2018-12-11 VITALS
DIASTOLIC BLOOD PRESSURE: 72 MMHG | BODY MASS INDEX: 24.09 KG/M2 | HEART RATE: 68 BPM | SYSTOLIC BLOOD PRESSURE: 114 MMHG | WEIGHT: 136 LBS

## 2018-12-11 DIAGNOSIS — Z30.430 ENCOUNTER FOR IUD INSERTION: ICD-10-CM

## 2018-12-11 PROCEDURE — 58300 INSERT INTRAUTERINE DEVICE: CPT | Performed by: OBSTETRICS & GYNECOLOGY

## 2018-12-11 NOTE — PROGRESS NOTES
23 y.o.    Female presents here today for her Mirena IUD insertion:     UPT:    Today the patient is counseled on the risks of IUD insertion. I also discussed with the patient the risk of infection on insertion, and had asked the patient to remain on pelvic rest for one week following the insertion. We also discussed the risk of IUD expulsion, the risk of uterine perforation and IUD migration. If the IUD does migrate the patient may require a separate procedure such as a laparoscopy to retrieve the migrated IUD. I also discussed the 1% risk of pregnancy with IUD use. Also discussed with patient today increased risk of ectopic pregnancy with IUD use. Discussed specific side effects of ParaGard IUD which can be increased vaginal bleeding during menses or increased dysmenorrhea. Also discussed today the possibility that IUD may need to be removed secondary to bleeding profile or pain. Also discussed were the possibility that partner can feel the IUD during intercourse. I also discussed the side effects of Mirena which can be amenorrhea or dysfunctional uterine bleeding or spotting.  Patient had the opportunity to ask questions regarding insertion, risks and benefits, all questions are answered in their entirety.  Informed consent is signed.    Procedure note  Urine pregnancy test is negative, informed consent was previously signed  The bimanual exam is performed the uterus is noted to be 8 weeks in size and is mid position  A speculum was inserted into the vagina, the cervix was cleansed with Betadine swabs x3  Tenaculum was placed on the anterior lip of the cervix  The uterus was sounded to a depth of 7 centimeters  The IUD is placed under sterile condition without difficulty  The strings trimmed to approximately 3 cm  Tenaculum was removed from the cervix and hemostasis was achieved with silver nitrate  The patient tolerated the procedure well    Patient is asked to followup in 4 to 6 weeks for IUD check. The  patient is asked to remain on pelvic rest for one week. She is asked to return to office sooner as needed for heavy vaginal bleeding, uncontrolled pain, fever, or any other concerns.

## 2019-02-01 ENCOUNTER — APPOINTMENT (OUTPATIENT)
Dept: OBGYN | Facility: CLINIC | Age: 24
End: 2019-02-01

## 2019-03-28 ENCOUNTER — OFFICE VISIT (OUTPATIENT)
Dept: URGENT CARE | Facility: PHYSICIAN GROUP | Age: 24
End: 2019-03-28
Payer: COMMERCIAL

## 2019-03-28 VITALS
RESPIRATION RATE: 12 BRPM | HEART RATE: 103 BPM | TEMPERATURE: 98.4 F | HEIGHT: 63 IN | WEIGHT: 136 LBS | BODY MASS INDEX: 24.1 KG/M2 | DIASTOLIC BLOOD PRESSURE: 70 MMHG | SYSTOLIC BLOOD PRESSURE: 102 MMHG | OXYGEN SATURATION: 97 %

## 2019-03-28 DIAGNOSIS — J01.00 ACUTE NON-RECURRENT MAXILLARY SINUSITIS: ICD-10-CM

## 2019-03-28 PROCEDURE — 99214 OFFICE O/P EST MOD 30 MIN: CPT | Performed by: PHYSICIAN ASSISTANT

## 2019-03-28 RX ORDER — AMOXICILLIN AND CLAVULANATE POTASSIUM 875; 125 MG/1; MG/1
1 TABLET, FILM COATED ORAL 2 TIMES DAILY
Qty: 20 TAB | Refills: 0 | Status: SHIPPED | OUTPATIENT
Start: 2019-03-28 | End: 2019-07-16

## 2019-03-28 ASSESSMENT — ENCOUNTER SYMPTOMS
FEVER: 0
SHORTNESS OF BREATH: 0
CARDIOVASCULAR NEGATIVE: 1
COUGH: 1
SINUS PAIN: 0
MYALGIAS: 1
WHEEZING: 0
RHINORRHEA: 1
HEADACHES: 1
SORE THROAT: 1
CHILLS: 1
GASTROINTESTINAL NEGATIVE: 1
SPUTUM PRODUCTION: 1

## 2019-03-28 NOTE — PROGRESS NOTES
Subjective:      Brenda Westbrook is a 24 y.o. female who presents with Cough (onset 2 weeks// ear pain// chest congestion// sore throat )            Cough   This is a new problem. The current episode started 1 to 4 weeks ago. The problem has been unchanged. The problem occurs every few minutes. The cough is productive of sputum. Associated symptoms include chills, ear pain, headaches, myalgias, nasal congestion, postnasal drip, rhinorrhea and a sore throat. Pertinent negatives include no fever, shortness of breath or wheezing. The symptoms are aggravated by lying down. She has tried OTC cough suppressant for the symptoms. The treatment provided mild relief. There is no history of asthma, bronchitis or pneumonia.       PMH:  has a past medical history of Asthma, exercise induced; Bronchitis; Chlamydia; Cold; Endometriosis; Head ache; Indigestion; Intractable migraine without aura and without status migrainosus (4/19/2017); Other specified symptom associated with female genital organs; Pain; Pain (8/23/12); Psychiatric problem; Sleep disturbances; Unspecified disorder of thyroid (2015); and Urinary bladder disorder.  MEDS:   Current Outpatient Prescriptions:   •  naproxen sodium (ANAPROX DS) 550 MG tablet, 1-2 tab headache onset; repeat in 4-6 hours prn., Disp: 45 Tab, Rfl: 1  •  sumatriptan (IMITREX) 100 MG tablet, 1 tab at headache onset; repeat in 1 hour prn (Patient not taking: Reported on 3/28/2019), Disp: 9 Tab, Rfl: 6  •  propranolol CR (INDERAL LA) 120 MG CAPSULE SR 24 HR, Take 1 Cap by mouth every day. (Patient not taking: Reported on 3/28/2019), Disp: 30 Cap, Rfl: 4  •  ibuprofen (MOTRIN) 600 MG Tab, Take 1 Tab by mouth every 6 hours as needed (For cramping after delivery; do not give if patient is receiving ketorolac (Toradol)). (Patient not taking: Reported on 3/28/2019), Disp: 30 Tab, Rfl: 0  ALLERGIES:   Allergies   Allergen Reactions   • Ciprofloxacin Vomiting   • Ciprofloxacin    • Codeine Hives  "  • Codeine    • Doxycycline Itching and Vomiting   • Doxycycline    • Keflex Itching   • Keflex      SURGHX:   Past Surgical History:   Procedure Laterality Date   • REPEAT C SECTION N/A 9/15/2018    Procedure: REPEAT C SECTION;  Surgeon: Rachna Patterson M.D.;  Location: LABOR AND DELIVERY;  Service: Labor and Delivery   • PRIMARY C SECTION Bilateral 6/11/2016    Procedure: PRIMARY C SECTION;  Surgeon: Trever Beckman M.D.;  Location: LABOR AND DELIVERY;  Service:    • MANNY BY LAPAROSCOPY  3/12/2015    Procedure: MANNY BY LAPAROSCOPY;  Surgeon: Sean Mitchell M.D.;  Location: SURGERY [facility] ORS;  Service:    • PELVISCOPY  2013   • CYSTOSCOPY  8/30/2012    Performed by QUINTIN LOPES at SURGERY [facility] ORS   • PELVISCOPY  4/19/2012    Performed by CHRISTIANO PUGH at SURGERY SAME DAY [facility] ORS   • MYRINGOTOMY     • STRABISMUS REPAIR      gerardo eyes   • TONSILLECTOMY       SOCHX:  reports that she has never smoked. She has never used smokeless tobacco. She reports that she does not drink alcohol or use drugs.  FH: family history includes Cancer in her unknown relative; Diabetes in her unknown relative; Heart Disease in her unknown relative; Hypertension in her unknown relative; Other in her brother and mother; Stroke in her unknown relative.      Review of Systems   Constitutional: Positive for chills. Negative for fever.   HENT: Positive for congestion, ear pain, postnasal drip, rhinorrhea and sore throat. Negative for sinus pain.    Respiratory: Positive for cough and sputum production. Negative for shortness of breath and wheezing.    Cardiovascular: Negative.    Gastrointestinal: Negative.    Musculoskeletal: Positive for myalgias.   Neurological: Positive for headaches.       Medications, Allergies, and current problem list reviewed today in Epic     Objective:     /70   Pulse (!) 103   Temp 36.9 °C (98.4 °F) (Temporal)   Resp 12   Ht 1.6 m (5' 3\")   Wt 61.7 kg " (136 lb)   SpO2 97%   BMI 24.09 kg/m²      Physical Exam   Constitutional: She is oriented to person, place, and time. She appears well-developed and well-nourished. No distress.   HENT:   Head: Normocephalic and atraumatic.   Right Ear: Hearing, tympanic membrane, external ear and ear canal normal. Tympanic membrane is not erythematous. No decreased hearing is noted.   Left Ear: Hearing, tympanic membrane, external ear and ear canal normal. Tympanic membrane is not erythematous. No decreased hearing is noted.   Nose: Right sinus exhibits maxillary sinus tenderness. Left sinus exhibits maxillary sinus tenderness.   Mouth/Throat: Normal dentition. Posterior oropharyngeal erythema present. No oropharyngeal exudate.   Eyes: Conjunctivae are normal. Right eye exhibits no discharge. Left eye exhibits no discharge.   Neck: Normal range of motion. Neck supple.   Cardiovascular: Normal rate, regular rhythm and normal heart sounds.    No murmur heard.  Pulmonary/Chest: Effort normal and breath sounds normal. No respiratory distress. She has no wheezes. She has no rales.   Lymphadenopathy:        Head (right side): Submandibular adenopathy present.        Head (left side): Submandibular adenopathy present.     She has no cervical adenopathy.        Right cervical: No superficial cervical adenopathy present.       Left cervical: No superficial cervical adenopathy present.   Neurological: She is alert and oriented to person, place, and time.   Skin: Skin is warm, dry and intact. She is not diaphoretic. No cyanosis. Nails show no clubbing.   Psychiatric: She has a normal mood and affect. Her behavior is normal. Judgment and thought content normal.   Nursing note and vitals reviewed.              Assessment/Plan:     1. Acute non-recurrent maxillary sinusitis  amoxicillin-clavulanate (AUGMENTIN) 875-125 MG Tab     Take full course of antibiotic  Tylenol and Motrin for fever and pain  OTC meds as discussed including oral  decongestant if tolerated  Increase fluids and rest  Nasal spray, nasal wash, Kinjal pot    Return to clinic or go to ED if symptoms worsen or persist. Indications for ED discussed at length. Patient voices understanding. Follow-up with your primary care provider in 3-5 days. Red flags discussed. All side effects of medication discussed including allergic response, GI upset, tendon injury, etc.    Please note that this dictation was created using voice recognition software. I have made every reasonable attempt to correct obvious errors, but I expect that there are errors of grammar and possibly content that I did not discover before finalizing the note.

## 2019-04-09 ENCOUNTER — APPOINTMENT (OUTPATIENT)
Dept: NEUROLOGY | Facility: MEDICAL CENTER | Age: 24
End: 2019-04-09
Payer: COMMERCIAL

## 2019-04-10 ENCOUNTER — HOSPITAL ENCOUNTER (OUTPATIENT)
Dept: RADIOLOGY | Facility: MEDICAL CENTER | Age: 24
End: 2019-04-10
Attending: OBSTETRICS & GYNECOLOGY
Payer: COMMERCIAL

## 2019-04-10 DIAGNOSIS — R10.32 ABDOMINAL PAIN, LEFT LOWER QUADRANT: ICD-10-CM

## 2019-04-10 PROCEDURE — 76830 TRANSVAGINAL US NON-OB: CPT

## 2019-04-12 ENCOUNTER — HOSPITAL ENCOUNTER (OUTPATIENT)
Dept: HOSPITAL 8 - CFH | Age: 24
Discharge: HOME | End: 2019-04-12
Payer: COMMERCIAL

## 2019-04-12 DIAGNOSIS — Z97.5: ICD-10-CM

## 2019-04-12 DIAGNOSIS — R10.32: Primary | ICD-10-CM

## 2019-04-12 PROCEDURE — 74018 RADEX ABDOMEN 1 VIEW: CPT

## 2019-04-30 ENCOUNTER — HOSPITAL ENCOUNTER (OUTPATIENT)
Dept: HOSPITAL 8 - OUT | Age: 24
Discharge: HOME | End: 2019-04-30
Attending: OBSTETRICS & GYNECOLOGY
Payer: COMMERCIAL

## 2019-04-30 VITALS — SYSTOLIC BLOOD PRESSURE: 119 MMHG | DIASTOLIC BLOOD PRESSURE: 72 MMHG

## 2019-04-30 VITALS — HEIGHT: 63 IN | WEIGHT: 134.7 LBS | BODY MASS INDEX: 23.87 KG/M2

## 2019-04-30 DIAGNOSIS — T83.32XA: Primary | ICD-10-CM
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
## 2019-04-30 DIAGNOSIS — Z88.5: ICD-10-CM

## 2019-04-30 DIAGNOSIS — N73.6: ICD-10-CM

## 2019-04-30 DIAGNOSIS — Z88.8: ICD-10-CM

## 2019-04-30 DIAGNOSIS — Z88.1: ICD-10-CM

## 2019-04-30 LAB
ALBUMIN SERPL-MCNC: 4.2 G/DL (ref 3.4–5)
ALP SERPL-CCNC: 74 U/L (ref 45–117)
ALT SERPL-CCNC: 14 U/L (ref 12–78)
ANION GAP SERPL CALC-SCNC: 7 MMOL/L (ref 5–15)
BASOPHILS # BLD AUTO: 0.04 X10^3/UL (ref 0–0.1)
BASOPHILS NFR BLD AUTO: 1 % (ref 0–1)
BILIRUB SERPL-MCNC: 0.3 MG/DL (ref 0.2–1)
CALCIUM SERPL-MCNC: 9.3 MG/DL (ref 8.5–10.1)
CHLORIDE SERPL-SCNC: 111 MMOL/L (ref 98–107)
CREAT SERPL-MCNC: 0.9 MG/DL (ref 0.55–1.02)
CULTURE INDICATED?: YES
EOSINOPHIL # BLD AUTO: 0.11 X10^3/UL (ref 0–0.4)
EOSINOPHIL NFR BLD AUTO: 2 % (ref 1–7)
ERYTHROCYTE [DISTWIDTH] IN BLOOD BY AUTOMATED COUNT: 13.6 % (ref 9.6–15.2)
HCG UR SG: 1.02 (ref 1–1.03)
LYMPHOCYTES # BLD AUTO: 2.01 X10^3/UL (ref 1–3.4)
LYMPHOCYTES NFR BLD AUTO: 32 % (ref 22–44)
MCH RBC QN AUTO: 29.9 PG (ref 27–34.8)
MCHC RBC AUTO-ENTMCNC: 33.5 G/DL (ref 32.4–35.8)
MCV RBC AUTO: 89.5 FL (ref 80–100)
MD: NO
MICROSCOPIC: (no result)
MONOCYTES # BLD AUTO: 0.42 X10^3/UL (ref 0.2–0.8)
MONOCYTES NFR BLD AUTO: 7 % (ref 2–9)
NEUTROPHILS # BLD AUTO: 3.69 X10^3/UL (ref 1.8–6.8)
NEUTROPHILS NFR BLD AUTO: 59 % (ref 42–75)
PLATELET # BLD AUTO: 228 X10^3/UL (ref 130–400)
PMV BLD AUTO: 8.1 FL (ref 7.4–10.4)
PROT SERPL-MCNC: 7.5 G/DL (ref 6.4–8.2)
RBC # BLD AUTO: 4.7 X10^6/UL (ref 3.82–5.3)

## 2019-04-30 PROCEDURE — 49320 DIAG LAPARO SEPARATE PROC: CPT

## 2019-04-30 PROCEDURE — 85025 COMPLETE CBC W/AUTO DIFF WBC: CPT

## 2019-04-30 PROCEDURE — 81001 URINALYSIS AUTO W/SCOPE: CPT

## 2019-04-30 PROCEDURE — 80053 COMPREHEN METABOLIC PANEL: CPT

## 2019-04-30 PROCEDURE — 36415 COLL VENOUS BLD VENIPUNCTURE: CPT

## 2019-04-30 PROCEDURE — 81025 URINE PREGNANCY TEST: CPT

## 2019-04-30 PROCEDURE — 87086 URINE CULTURE/COLONY COUNT: CPT

## 2019-04-30 RX ADMIN — FENTANYL CITRATE PRN MCG: 50 INJECTION INTRAMUSCULAR; INTRAVENOUS at 13:16

## 2019-04-30 RX ADMIN — FENTANYL CITRATE PRN MCG: 50 INJECTION INTRAMUSCULAR; INTRAVENOUS at 12:57

## 2019-04-30 RX ADMIN — FENTANYL CITRATE PRN MCG: 50 INJECTION INTRAMUSCULAR; INTRAVENOUS at 13:02

## 2019-07-16 ENCOUNTER — OFFICE VISIT (OUTPATIENT)
Dept: NEUROLOGY | Facility: MEDICAL CENTER | Age: 24
End: 2019-07-16
Payer: COMMERCIAL

## 2019-07-16 VITALS
DIASTOLIC BLOOD PRESSURE: 54 MMHG | HEART RATE: 68 BPM | BODY MASS INDEX: 23.74 KG/M2 | SYSTOLIC BLOOD PRESSURE: 98 MMHG | OXYGEN SATURATION: 97 % | TEMPERATURE: 97.4 F | HEIGHT: 63 IN | WEIGHT: 134 LBS | RESPIRATION RATE: 14 BRPM

## 2019-07-16 DIAGNOSIS — G43.019 INTRACTABLE MIGRAINE WITHOUT AURA AND WITHOUT STATUS MIGRAINOSUS: Primary | ICD-10-CM

## 2019-07-16 PROCEDURE — 99214 OFFICE O/P EST MOD 30 MIN: CPT | Performed by: PSYCHIATRY & NEUROLOGY

## 2019-07-16 RX ORDER — SUMATRIPTAN 100 MG/1
TABLET, FILM COATED ORAL
Qty: 27 TAB | Refills: 3 | Status: SHIPPED | OUTPATIENT
Start: 2019-07-16 | End: 2021-02-05

## 2019-07-16 RX ORDER — PREDNISONE 10 MG/1
TABLET ORAL
Qty: 45 TAB | Refills: 0 | Status: SHIPPED | OUTPATIENT
Start: 2019-07-16 | End: 2019-08-15 | Stop reason: SDUPTHER

## 2019-07-16 RX ORDER — PROPRANOLOL HYDROCHLORIDE 120 MG/1
120 CAPSULE, EXTENDED RELEASE ORAL DAILY
Qty: 90 CAP | Refills: 3 | Status: SHIPPED | OUTPATIENT
Start: 2019-07-16 | End: 2021-02-05 | Stop reason: SDUPTHER

## 2019-07-16 RX ORDER — METOCLOPRAMIDE 10 MG/1
TABLET ORAL
Qty: 45 TAB | Refills: 1 | Status: SHIPPED | OUTPATIENT
Start: 2019-07-16 | End: 2021-02-05

## 2019-07-16 RX ORDER — NAPROXEN SODIUM 550 MG/1
TABLET ORAL
Qty: 90 TAB | Refills: 3 | Status: SHIPPED | OUTPATIENT
Start: 2019-07-16 | End: 2021-02-05

## 2019-07-16 RX ORDER — NORETHINDRONE ACETATE AND ETHINYL ESTRADIOL 1MG-20(24)
1 KIT ORAL
Refills: 11 | COMMUNITY
Start: 2019-06-30 | End: 2021-02-05

## 2019-07-16 ASSESSMENT — PATIENT HEALTH QUESTIONNAIRE - PHQ9: CLINICAL INTERPRETATION OF PHQ2 SCORE: 0

## 2019-07-16 ASSESSMENT — ENCOUNTER SYMPTOMS
DEPRESSION: 0
MEMORY LOSS: 1
HEADACHES: 1

## 2019-07-16 NOTE — PROGRESS NOTES
Subjective:      Brenda Westbrook is a 24 y.o. female who presents for follow-up, after nearly 2-year hiatus, with a history of migraine without aura, which has in over the last 3 weeks worsened noticeably.     HPI    When last seen, Brenda states that on Inderal  mg, she had been doing very well, she would get a migraine maybe once every 3 months.  For this the cocktail of Imitrex and Anaprox works, though the Imitrex still makes her very nauseated.  She was doing well until about 3 weeks ago when things took off.    She now complains of a daily moderate headache, present upon awakening, and which typically worsens as the day goes on.  She describes a bifrontal localization, it can increase with simple activities, concentration becomes curtailed.  Sensitivities do begin to develop as the day wears on, into the evening for 5 days in a week, she will then take her Imitrex and Anaprox.  Unfortunately the nausea is now worse, but she does tend to sleep it off.  Unfortunately, the next day the cycle repeats itself.    She denies any major changes at this time when things started, though 3 months ago her IUD was surgically removed and she was placed on an estrogen-based 3-week on, one-week placebo, estrogen-based contraceptive.  The headaches did start with the third cycle of her estrogen 3-week interval.  She does not recall being on estrogen birth control in the past, on NuvaRing, the headaches were not adversely affected.    Medical, surgical and family histories are reviewed in the electronic health record, there are no new drug allergies.  She and her , Doni, are doing well.  There are 2 children still are keeping him up at night on occasion, this dynamic is not changed.  She is on Inderal  mg daily, Imitrex 100 mg/naproxen 550 mg as needed, and Blisovi contraceptive.    Review of Systems   Constitutional: Positive for malaise/fatigue.   Neurological: Positive for headaches.  "  Psychiatric/Behavioral: Positive for memory loss. Negative for depression.   All other systems reviewed and are negative.       Objective:     BP (!) 98/54 (BP Location: Right arm, Patient Position: Sitting, BP Cuff Size: Adult)   Pulse 68   Temp 36.3 °C (97.4 °F) (Temporal)   Resp 14   Ht 1.6 m (5' 3\")   Wt 60.8 kg (134 lb)   SpO2 97%   BMI 23.74 kg/m²      Physical Exam    She appears in some mild distress complaining of moderate headache.  There is no photophobia.  She is still quite cooperative.  Vital signs are stable.  There is no malar rash or jaw claudication.  The neck is supple, range of motion is full.  There is no spasm, there is right-sided cervical paraspinal and trapezius muscle body tenderness without trigger points.  Carotid pulses are present bilaterally without asymmetry.  Cardiac evaluation reveals a regular rhythm.  There is no lower extremity edema.    Fully oriented, there is no aphasia or inattention.  PERRLA/EOMI, visual fields are full, facial movements are symmetric, there is no bulbar dysfunction with the tongue and uvula midline, sensory exam is intact to temperature bilaterally.  Shoulder shrug and head rotation are intact.    Musculoskeletal exam reveals normal tone without tremor or drift.  Strength is 5/5 throughout.  The toes are downgoing, there are no reflex asymmetries.    She stands easily, there is no postural or appendicular dystaxia.  Fine motor control and repetitive movements are intact in all 4 extremities.    Sensory exam is intact to vibration and temperature throughout.     Assessment/Plan:     1. Intractable migraine without aura and without status migrainosus  Either a spontaneous resurgence or, what I suspect may be contributing, is an effective estrogen, and even though there was a delay of 1 or 2 months after she had been on her estrogen contraceptive, this is not unheard of.  I asked her to talk with her gynecologist, Dr. Andrez Flores, about possibly " getting her off estrogen based contraception, using a progestin based product, using a very low dose continuous estrogen such as Seasonale or Seasonique.  The other option is to have her  use a condom.  If this is a spontaneous issue, potential for control with short-term prophylaxis is improved.  Within normal examination, I doubt that these headaches are symptomatic something else, so I do not think diagnostics are indicated.    In the interim, a steroid titration will be used hopefully to break this persistent headache cycle.  Another option would be to use Migranal nasal spray 3 times a day for 3 days.  If this is an estrogen-based problem, none of this will really do much except mitigate briefly.  Prednisone 60 mg daily will be started, reduce by 10 mg every other day until off.  Side effects were reviewed.  For the nausea, she was told to take Reglan upwards of 30 mg with the Imitrex and Anaprox dosing.  Dystonia and sleepiness were possible side effects.  She tolerated Reglan in the past.    The third option is to increase Inderal LA to 240 mg, the problem being that it may take a little time and we are dealing with potential side effects.  Face-to-face time spent reviewing all of the above.  Phone contact in the interim, we will follow-up in another 6 months.    - naproxen sodium (ANAPROX DS) 550 MG tablet; 1-2 tab headache onset; repeat in 4-6 hours prn.  Dispense: 90 Tab; Refill: 3  - propranolol CR (INDERAL LA) 120 MG CAPSULE SR 24 HR; Take 1 Cap by mouth every day.  Dispense: 90 Cap; Refill: 3  - sumatriptan (IMITREX) 100 MG tablet; 1 tab at headache onset; repeat in 1 hour prn  Dispense: 27 Tab; Refill: 3    Time: 25 minutes spent face-to-face for exam, review, discussion, and education, of this over 80% of the time spent face-to-face counseling and coordinating care.

## 2019-08-06 ENCOUNTER — TELEPHONE (OUTPATIENT)
Dept: NEUROLOGY | Facility: MEDICAL CENTER | Age: 24
End: 2019-08-06

## 2019-08-06 NOTE — TELEPHONE ENCOUNTER
DRAKEM for pt to call me back at [facility] to touch base with her. She left me a message stating her prednisone taper did not help and I just wanted to see how she was feeling. I asked her to call me back.

## 2019-08-17 RX ORDER — PREDNISONE 10 MG/1
TABLET ORAL
Qty: 45 TAB | Refills: 0 | Status: SHIPPED | OUTPATIENT
Start: 2019-08-17 | End: 2021-02-05

## 2019-10-22 ENCOUNTER — OFFICE VISIT (OUTPATIENT)
Dept: URGENT CARE | Facility: PHYSICIAN GROUP | Age: 24
End: 2019-10-22
Payer: COMMERCIAL

## 2019-10-22 VITALS
HEART RATE: 96 BPM | SYSTOLIC BLOOD PRESSURE: 122 MMHG | WEIGHT: 135 LBS | BODY MASS INDEX: 23.92 KG/M2 | RESPIRATION RATE: 18 BRPM | HEIGHT: 63 IN | OXYGEN SATURATION: 98 % | DIASTOLIC BLOOD PRESSURE: 64 MMHG | TEMPERATURE: 96.7 F

## 2019-10-22 DIAGNOSIS — J01.00 ACUTE NON-RECURRENT MAXILLARY SINUSITIS: ICD-10-CM

## 2019-10-22 DIAGNOSIS — R05.9 COUGH: ICD-10-CM

## 2019-10-22 PROCEDURE — 99214 OFFICE O/P EST MOD 30 MIN: CPT | Performed by: PHYSICIAN ASSISTANT

## 2019-10-22 RX ORDER — FLUTICASONE PROPIONATE 50 MCG
1 SPRAY, SUSPENSION (ML) NASAL DAILY
Qty: 16 G | Refills: 0 | Status: SHIPPED | OUTPATIENT
Start: 2019-10-22 | End: 2021-02-05

## 2019-10-22 RX ORDER — AMOXICILLIN AND CLAVULANATE POTASSIUM 875; 125 MG/1; MG/1
1 TABLET, FILM COATED ORAL 2 TIMES DAILY
Qty: 20 TAB | Refills: 0 | Status: SHIPPED | OUTPATIENT
Start: 2019-10-22 | End: 2019-11-01

## 2019-10-22 RX ORDER — BENZONATATE 100 MG/1
100 CAPSULE ORAL 3 TIMES DAILY PRN
Qty: 30 CAP | Refills: 0 | Status: SHIPPED | OUTPATIENT
Start: 2019-10-22 | End: 2021-02-05

## 2019-10-22 SDOH — HEALTH STABILITY: MENTAL HEALTH: HOW OFTEN DO YOU HAVE A DRINK CONTAINING ALCOHOL?: MONTHLY OR LESS

## 2019-10-22 ASSESSMENT — ENCOUNTER SYMPTOMS
HOARSE VOICE: 1
SORE THROAT: 0
SHORTNESS OF BREATH: 0
HEADACHES: 1
SINUS PRESSURE: 1
COUGH: 1

## 2019-10-22 NOTE — LETTER
October 22, 2019    To Whom It May Concern:         This is confirmation that Brenda Westbrook attended her scheduled appointment with Davy Shirley P.A.-C. on 10/22/19. Please excuse her from work on 10/21-10/22.         If you have any questions please do not hesitate to call me at the phone number listed below.    Sincerely,          Davy Shirley P.A.-C.  [phone]

## 2019-10-22 NOTE — PROGRESS NOTES
Subjective:   Brenda Westbrook is a 24 y.o. female who presents for Cough ([id number])        Sinusitis   This is a new problem. The current episode started 1 to 4 weeks ago (2 weeks). The problem is unchanged. The maximum temperature recorded prior to her arrival was 100.4 - 100.9 F. The fever has been present for 3 to 4 days (at night). Her pain is at a severity of 7/10. Associated symptoms include congestion, coughing, ear pain (left), headaches, a hoarse voice and sinus pressure. Pertinent negatives include no shortness of breath or sore throat. Past treatments include oral decongestants. The treatment provided no relief.     Review of Systems   HENT: Positive for congestion, ear pain (left), hoarse voice and sinus pressure. Negative for sore throat.    Respiratory: Positive for cough. Negative for shortness of breath.    Neurological: Positive for headaches.       PMH:  has a past medical history of Asthma, exercise induced, Bronchitis, Chlamydia, Cold, Endometriosis, Head ache, Indigestion, Intractable migraine without aura and without status migrainosus (4/19/2017), Other specified symptom associated with female genital organs, Pain, Pain (8/23/12), Psychiatric problem, Sleep disturbances, Unspecified disorder of thyroid (2015), and Urinary bladder disorder.  MEDS:   Current Outpatient Medications:   •  fluticasone (FLONASE) 50 MCG/ACT nasal spray, Spray 1 Spray in nose every day., Disp: 16 g, Rfl: 0  •  benzonatate (TESSALON) 100 MG Cap, Take 1 Cap by mouth 3 times a day as needed., Disp: 30 Cap, Rfl: 0  •  amoxicillin-clavulanate (AUGMENTIN) 875-125 MG Tab, Take 1 Tab by mouth 2 times a day for 10 days., Disp: 20 Tab, Rfl: 0  •  metoclopramide (REGLAN) 10 MG Tab, 1-3 tab at headache/nausea onset; repeat in 4-6 hours prn, Disp: 45 Tab, Rfl: 1  •  naproxen sodium (ANAPROX DS) 550 MG tablet, 1-2 tab headache onset; repeat in 4-6 hours prn., Disp: 90 Tab, Rfl: 3  •  sumatriptan (IMITREX) 100 MG  "tablet, 1 tab at headache onset; repeat in 1 hour prn, Disp: 27 Tab, Rfl: 3  •  predniSONE (DELTASONE) 10 MG Tab, TAKE 6 TABS BY MOUTH DAILY FOR 2 DAYS, THEN REDUCE BY 1 TAB EVERY OTHER DAY UNTIL OFF (Patient not taking: Reported on 10/22/2019), Disp: 45 Tab, Rfl: 0  •  BLISOVI 24 FE 1-20 MG-MCG(24) Tab, 1 Tab., Disp: , Rfl: 11  •  propranolol CR (INDERAL LA) 120 MG CAPSULE SR 24 HR, Take 1 Cap by mouth every day. (Patient not taking: Reported on 10/22/2019), Disp: 90 Cap, Rfl: 3  ALLERGIES:   Allergies   Allergen Reactions   • Ciprofloxacin Vomiting   • Ciprofloxacin    • Codeine Hives   • Codeine    • Doxycycline Itching and Vomiting   • Doxycycline    • Keflex Itching   • Keflex      SURGHX:   Past Surgical History:   Procedure Laterality Date   • REPEAT C SECTION N/A 9/15/2018    Procedure: REPEAT C SECTION;  Surgeon: Rachna Patterson M.D.;  Location: LABOR AND DELIVERY;  Service: Labor and Delivery   • PRIMARY C SECTION Bilateral 6/11/2016    Procedure: PRIMARY C SECTION;  Surgeon: Trever Beckman M.D.;  Location: LABOR AND DELIVERY;  Service:    • MANNY BY LAPAROSCOPY  3/12/2015    Procedure: MANNY BY LAPAROSCOPY;  Surgeon: Sean Mitchell M.D.;  Location: SURGERY [facility];  Service:    • PELVISCOPY  2013   • CYSTOSCOPY  8/30/2012    Performed by QUINTIN LOPES at SURGERY [facility] ORS   • PELVISCOPY  4/19/2012    Performed by CHRISTIANO PUGH at SURGERY SAME DAY [facility] ORS   • MYRINGOTOMY     • STRABISMUS REPAIR      gerardo eyes   • TONSILLECTOMY       SOCHX:  reports that she has never smoked. She has never used smokeless tobacco. She reports that she drinks alcohol. She reports that she does not use drugs.  FH: Family history was reviewed, no pertinent findings to report   Objective:   /64   Pulse 96   Temp 35.9 °C (96.7 °F) (Temporal)   Resp 18   Ht 1.6 m (5' 3\")   Wt 61.2 kg (135 lb)   SpO2 98%   BMI 23.91 kg/m²   Physical Exam   Constitutional: She is " oriented to person, place, and time. Vital signs are normal. She appears well-developed and well-nourished.  Non-toxic appearance. No distress.   HENT:   Head: Normocephalic and atraumatic.   Right Ear: Tympanic membrane, external ear and ear canal normal.   Left Ear: Tympanic membrane, external ear and ear canal normal.   Nose: Mucosal edema and rhinorrhea present. Right sinus exhibits maxillary sinus tenderness and frontal sinus tenderness. Left sinus exhibits maxillary sinus tenderness and frontal sinus tenderness.   Mouth/Throat: Uvula is midline and mucous membranes are normal. Posterior oropharyngeal erythema present.   Significant congestion.    Eyes: Conjunctivae and lids are normal.   Neck: Neck supple.   Cardiovascular: Normal rate, regular rhythm, S1 normal, S2 normal and normal heart sounds. Exam reveals no gallop and no friction rub.   No murmur heard.  Pulmonary/Chest: Effort normal and breath sounds normal. No respiratory distress. She has no decreased breath sounds. She has no wheezes. She has no rhonchi. She has no rales.   Intermittent wet cough   Abdominal: Normal appearance.   Musculoskeletal:   Normal range of motion. Exhibits no edema and no tenderness.    Neurological: She is alert and oriented to person, place, and time. No cranial nerve deficit or sensory deficit.   Skin: Skin is warm, dry and intact. Capillary refill takes less than 2 seconds.   Psychiatric: She has a normal mood and affect. Her speech is normal and behavior is normal. Judgment and thought content normal. Cognition and memory are normal.   Vitals reviewed.        Assessment/Plan:   1. Acute non-recurrent maxillary sinusitis  - fluticasone (FLONASE) 50 MCG/ACT nasal spray; Spray 1 Spray in nose every day.  Dispense: 16 g; Refill: 0  - amoxicillin-clavulanate (AUGMENTIN) 875-125 MG Tab; Take 1 Tab by mouth 2 times a day for 10 days.  Dispense: 20 Tab; Refill: 0    2. Cough  - benzonatate (TESSALON) 100 MG Cap; Take 1 Cap by  mouth 3 times a day as needed.  Dispense: 30 Cap; Refill: 0    Pt instructed to complete full course of medication despite symptomatic improvement. Pt to take med with meals to alleviate GI upset. Pt to take a probiotic or eat Rosalind’s yogurt/ kefir while taking the medication.    Flonase 1 squirt in each nostril, as instructed in clinic, once a day.  Use nasal saline TID to promote drainage.   Salt water gurgles to soothe sore throat.  Ayr saline gel to moisturize nasal passage and prevent bleeding.  Try to use sudafed sparingly in order to allow sinuses to drain.  Avoid the longer formulations and try to take only in the morning and/or at noon if needed.  If you fail to improve in 3-5 days or symptoms worsen/new symptoms develop, RTC for reevaluation    Differential diagnosis, natural history, supportive care, and indications for immediate follow-up discussed.

## 2020-02-08 ENCOUNTER — OFFICE VISIT (OUTPATIENT)
Dept: URGENT CARE | Facility: PHYSICIAN GROUP | Age: 25
End: 2020-02-08
Payer: COMMERCIAL

## 2020-02-08 ENCOUNTER — HOSPITAL ENCOUNTER (OUTPATIENT)
Dept: RADIOLOGY | Facility: MEDICAL CENTER | Age: 25
End: 2020-02-08
Attending: PHYSICIAN ASSISTANT
Payer: COMMERCIAL

## 2020-02-08 VITALS
SYSTOLIC BLOOD PRESSURE: 108 MMHG | HEIGHT: 63 IN | OXYGEN SATURATION: 98 % | TEMPERATURE: 99.1 F | HEART RATE: 91 BPM | DIASTOLIC BLOOD PRESSURE: 64 MMHG | BODY MASS INDEX: 23.92 KG/M2 | WEIGHT: 135 LBS

## 2020-02-08 DIAGNOSIS — M25.522 LEFT ELBOW PAIN: ICD-10-CM

## 2020-02-08 DIAGNOSIS — S53.402A SPRAIN OF LEFT ELBOW, INITIAL ENCOUNTER: ICD-10-CM

## 2020-02-08 PROCEDURE — 73080 X-RAY EXAM OF ELBOW: CPT | Mod: LT

## 2020-02-08 PROCEDURE — 99214 OFFICE O/P EST MOD 30 MIN: CPT | Performed by: PHYSICIAN ASSISTANT

## 2020-02-08 ASSESSMENT — ENCOUNTER SYMPTOMS
FEVER: 0
NAUSEA: 0
JOINT SWELLING: 0
ARTHRALGIAS: 1
CHILLS: 0
VOMITING: 0

## 2020-02-08 ASSESSMENT — PAIN SCALES - GENERAL: PAINLEVEL: 6=MODERATE PAIN

## 2020-02-08 NOTE — PATIENT INSTRUCTIONS
Elbow Injury  You or your child has an elbow injury. X-rays and exam today do not show evidence of a fracture (broken bone). That means that only a sling or splint may be required for a brief period of time as directed by your caregiver.  HOME CARE INSTRUCTIONS  · Only take over-the-counter or prescription medicines for pain, discomfort, or fever as directed by your caregiver.   · If you have a splint held on with an elastic wrap or a cast, watch your hand or fingers. If they become numb or cold and blue, loosen the wrap and reapply more loosely. See your caregiver if there is no relief.   · You may use ice on your elbow for 15-20 minutes, 3-4 times per day, for the first 2 to 3 days.   · Use your elbow as directed.   · See your caregiver as directed. It is very important to keep all follow-up referrals and appointments in order to avoid any long-term problems with your elbow including chronic pain or inability to move the elbow normally.   SEEK IMMEDIATE MEDICAL CARE IF:   · There is swelling or increasing pain in your elbow which is not relieved with medications.   · You begin to lose feeling in your hand or fingers, or develop swelling of the hand and fingers.   · You get a cold or blue hand or fingers on injured side.   · If your elbow remains sore, your caregiver may want to x-ray it again. A hairline fracture may not show up on the first x-rays and may only be seen on repeat x-rays ten days to two weeks later. A specialist (radiologist) may examine your x-rays at a later time. In order to get results from the radiologist or their department, make sure you know how and when you are to get that information. It is your responsibility to get results of any tests you may have had.   MAKE SURE YOU:   · Understand these instructions.   · Will watch your condition.   · Will get help right away if you are not doing well or get worse.   Document Released: 03/09/2005 Document Revised: 03/11/2013 Document Reviewed:  08/05/2009  [employer]® Patient Information ©2013 [employer].

## 2020-02-08 NOTE — PROGRESS NOTES
Subjective:   Brenda Westbrook is a 25 y.o. female who presents for Elbow Pain (left elbow pain after kickboxing 1030 today)        Left lateral elbow pain that was sudden in onset immediately following throwing a punch during kickboxing.  Pain currently7/10.  Pain is aching and occasionally sharp.  Pain occasionally radiates up arm.  Range of motion is restricted by pain.  Denies prior injury.  Patient is right-hand dominant.  Arm Injury   This is a new problem. The current episode started today. The problem occurs constantly. The problem has been unchanged. Associated symptoms include arthralgias. Pertinent negatives include no chills, fever, joint swelling, nausea or vomiting. The symptoms are aggravated by bending and stress. She has tried nothing for the symptoms. The treatment provided no relief.     Review of Systems   Constitutional: Negative for chills and fever.   Gastrointestinal: Negative for nausea and vomiting.   Musculoskeletal: Positive for arthralgias. Negative for joint swelling.       PMH:  has a past medical history of Asthma, exercise induced, Bronchitis, Chlamydia, Cold, Endometriosis, Head ache, Indigestion, Intractable migraine without aura and without status migrainosus (4/19/2017), Other specified symptom associated with female genital organs, Pain, Pain (8/23/12), Psychiatric problem, Sleep disturbances, Unspecified disorder of thyroid (2015), and Urinary bladder disorder.  MEDS:   Current Outpatient Medications:   •  fluticasone (FLONASE) 50 MCG/ACT nasal spray, Spray 1 Spray in nose every day. (Patient not taking: Reported on 2/8/2020), Disp: 16 g, Rfl: 0  •  benzonatate (TESSALON) 100 MG Cap, Take 1 Cap by mouth 3 times a day as needed. (Patient not taking: Reported on 2/8/2020), Disp: 30 Cap, Rfl: 0  •  predniSONE (DELTASONE) 10 MG Tab, TAKE 6 TABS BY MOUTH DAILY FOR 2 DAYS, THEN REDUCE BY 1 TAB EVERY OTHER DAY UNTIL OFF (Patient not taking: Reported on 10/22/2019), Disp: 45 Tab,  Rfl: 0  •  BLISOVI 24 FE 1-20 MG-MCG(24) Tab, 1 Tab., Disp: , Rfl: 11  •  metoclopramide (REGLAN) 10 MG Tab, 1-3 tab at headache/nausea onset; repeat in 4-6 hours prn (Patient not taking: Reported on 2/8/2020), Disp: 45 Tab, Rfl: 1  •  naproxen sodium (ANAPROX DS) 550 MG tablet, 1-2 tab headache onset; repeat in 4-6 hours prn. (Patient not taking: Reported on 2/8/2020), Disp: 90 Tab, Rfl: 3  •  propranolol CR (INDERAL LA) 120 MG CAPSULE SR 24 HR, Take 1 Cap by mouth every day. (Patient not taking: Reported on 10/22/2019), Disp: 90 Cap, Rfl: 3  •  sumatriptan (IMITREX) 100 MG tablet, 1 tab at headache onset; repeat in 1 hour prn (Patient not taking: Reported on 2/8/2020), Disp: 27 Tab, Rfl: 3  ALLERGIES:   Allergies   Allergen Reactions   • Ciprofloxacin Vomiting   • Ciprofloxacin    • Codeine Hives   • Codeine    • Doxycycline Itching and Vomiting   • Doxycycline    • Keflex Itching   • Keflex      SURGHX:   Past Surgical History:   Procedure Laterality Date   • REPEAT C SECTION N/A 9/15/2018    Procedure: REPEAT C SECTION;  Surgeon: Rachna Patterson M.D.;  Location: LABOR AND DELIVERY;  Service: Labor and Delivery   • PRIMARY C SECTION Bilateral 6/11/2016    Procedure: PRIMARY C SECTION;  Surgeon: Trever Beckman M.D.;  Location: LABOR AND DELIVERY;  Service:    • MANNY BY LAPAROSCOPY  3/12/2015    Procedure: MANNY BY LAPAROSCOPY;  Surgeon: Sean Mitchell M.D.;  Location: SURGERY [facility] ORS;  Service:    • PELVISCOPY  2013   • CYSTOSCOPY  8/30/2012    Performed by QUINTIN LOPES at SURGERY [facility] ORS   • PELVISCOPY  4/19/2012    Performed by CHRISTIANO PUGH at SURGERY SAME DAY [facility] ORS   • MYRINGOTOMY     • STRABISMUS REPAIR      gerardo eyes   • TONSILLECTOMY       SOCHX:  reports that she has never smoked. She has never used smokeless tobacco. She reports current alcohol use. She reports that she does not use drugs.  FH: Family history was reviewed, no pertinent findings to  "report   Objective:   /64   Pulse 91   Temp 37.3 °C (99.1 °F)   Ht 1.6 m (5' 3\")   Wt 61.2 kg (135 lb)   SpO2 98%   BMI 23.91 kg/m²   Physical Exam  Vitals signs reviewed.   Constitutional:       General: She is not in acute distress.     Appearance: Normal appearance. She is well-developed. She is not toxic-appearing.   HENT:      Head: Normocephalic and atraumatic.      Right Ear: External ear normal.      Left Ear: External ear normal.      Nose: Nose normal.   Eyes:      General: Lids are normal.      Conjunctiva/sclera: Conjunctivae normal.   Neck:      Musculoskeletal: Neck supple.   Cardiovascular:      Rate and Rhythm: Normal rate and regular rhythm.   Pulmonary:      Effort: Pulmonary effort is normal. No respiratory distress.      Breath sounds: Normal breath sounds.   Musculoskeletal:      Left elbow: She exhibits decreased range of motion. She exhibits no effusion, no deformity and no laceration. Tenderness found. Lateral epicondyle (significant) and olecranon process (mild) tenderness noted. No radial head and no medial epicondyle tenderness noted.      Comments: Radial pulse +2   Skin:     General: Skin is warm and dry.      Capillary Refill: Capillary refill takes less than 2 seconds.   Neurological:      Mental Status: She is alert and oriented to person, place, and time.      Cranial Nerves: No cranial nerve deficit.      Sensory: No sensory deficit.      Comments: Sensation intact and comparable distally.   Psychiatric:         Speech: Speech normal.         Behavior: Behavior normal.         Thought Content: Thought content normal.         Judgment: Judgment normal.           Assessment/Plan:   1. Left elbow pain  - DX-ELBOW-COMPLETE 3+ LEFT; Future    2. Sprain of left elbow, initial encounter     XR: No fracture or dislocation by my read.   Radiology review:    TECHNIQUE/EXAM DESCRIPTION AND NUMBER OF VIEWS:  3 views of the LEFT elbow.     COMPARISON:  None     FINDINGS:  Bone " mineralization is normal.  There is no evidence of fracture or dislocation.  Soft tissues are normal.     IMPRESSION:     No evidence of fracture or dislocation.    Patient fitted with sling for protection and comfort.  I would like her to remove this regularly and gently move the joint through all ranges of motion.  Start 600 mg of ibuprofen 3 times a day, with food.  Ice for first 2 to 3 days and switch to heat thereafter.  Patient should expect significant improvement within 7 to 10 days and full resolution of symptoms in 3 to 4 weeks.  If patient is develop new or worsening symptoms or not meeting these timelines she should follow-up with her PCP for reevaluation and imaging as indicated.  May not return to kickboxing or other vigorous activity until symptoms fully resolved.    Differential diagnosis, natural history, supportive care, and indications for immediate follow-up discussed.

## 2020-11-23 ENCOUNTER — TELEPHONE (OUTPATIENT)
Dept: NEUROLOGY | Facility: MEDICAL CENTER | Age: 25
End: 2020-11-23

## 2020-11-23 DIAGNOSIS — G43.019 INTRACTABLE MIGRAINE WITHOUT AURA AND WITHOUT STATUS MIGRAINOSUS: ICD-10-CM

## 2020-11-23 RX ORDER — BUTALBITAL, ACETAMINOPHEN AND CAFFEINE 300; 40; 50 MG/1; MG/1; MG/1
1-2 CAPSULE ORAL EVERY 4 HOURS PRN
Qty: 45 CAP | Refills: 1 | Status: SHIPPED | OUTPATIENT
Start: 2020-11-23 | End: 2021-01-23

## 2020-11-23 NOTE — TELEPHONE ENCOUNTER
For now, and until we have a chance to talk, she needs to hold on the Imitrex and Anaprox.  Though they probably would be safe, I need to have a longer discussion with her.  In the meantime, Fioricet tablets can be used, I will forward a prescription for her.  If her headaches are happening with real frequency, she may require the use of a maintenance therapy.  We will need to talk about this when I see her.    Give her my heartfelt congratulations.

## 2020-11-23 NOTE — TELEPHONE ENCOUNTER
Patient has appointment 02/05/2021 at 2:20PM. Patient confirmed with me that she is pregnant . She has been having headaches a lot more lately . She uses tylenol a lot and it is not helping . Does have a RX of Naproxen and Imitrex . Needs refills for both of those medications . I let her know I would ask you if its ok to take while pregnant ? Or if there is something she can take to get relief .

## 2020-11-24 NOTE — TELEPHONE ENCOUNTER
Spoke to patient per Dr. Velásquez for medication to hold off on . A new prescription was sent to the pharmacy .

## 2020-12-14 ENCOUNTER — INITIAL PRENATAL (OUTPATIENT)
Dept: OBGYN | Facility: CLINIC | Age: 25
End: 2020-12-14
Payer: COMMERCIAL

## 2020-12-14 VITALS — DIASTOLIC BLOOD PRESSURE: 73 MMHG | SYSTOLIC BLOOD PRESSURE: 111 MMHG | WEIGHT: 143 LBS | BODY MASS INDEX: 25.33 KG/M2

## 2020-12-14 DIAGNOSIS — N93.8 DUB (DYSFUNCTIONAL UTERINE BLEEDING): ICD-10-CM

## 2020-12-14 LAB
INT CON NEG: NEGATIVE
INT CON POS: POSITIVE
POC URINE PREGNANCY TEST: POSITIVE

## 2020-12-14 PROCEDURE — 81025 URINE PREGNANCY TEST: CPT | Performed by: OBSTETRICS & GYNECOLOGY

## 2020-12-14 PROCEDURE — 99214 OFFICE O/P EST MOD 30 MIN: CPT | Performed by: OBSTETRICS & GYNECOLOGY

## 2020-12-14 NOTE — PROGRESS NOTES
Pt here for DUB visit  # [phone]   First prenatal care  Pt states no VB or LOF   Pharmacy verified.  AFP? Unsure

## 2020-12-14 NOTE — PROGRESS NOTES
Chief complaint;  This patient is a 25 y.o. female , No LMP recorded. Patient is pregnant. presents complaining of dysfunctional uterine bleeding.    Review of systems-denies; chest pain, shortness of breath, fever, chills, abdominal pain  Past OB history-  OB History    Para Term  AB Living   3 2 2 0 0 2   SAB TAB Ectopic Molar Multiple Live Births   0 0 0     2      # Outcome Date GA Lbr Bhupinder/2nd Weight Sex Delivery Anes PTL Lv   3 Current            2 Term 09/15/18 40w1d  2.545 kg (5 lb 9.8 oz) M CS-LTranv   MARYSOL   1 Term 16 41w0d  3.09 kg (6 lb 13 oz) M CS-LTranv Spinal N MARYSOL      Birth Comments: double nuchal cord, NRFS      Complications: Fetal Intolerance     Past surgical history-   Past Surgical History:   Procedure Laterality Date   • REPEAT C SECTION N/A 9/15/2018    Procedure: REPEAT C SECTION;  Surgeon: Rachna Patterson M.D.;  Location: LABOR AND DELIVERY;  Service: Labor and Delivery   • PRIMARY C SECTION Bilateral 2016    Procedure: PRIMARY C SECTION;  Surgeon: Trever Beckman M.D.;  Location: LABOR AND DELIVERY;  Service:    • MANNY BY LAPAROSCOPY  3/12/2015    Procedure: MANNY BY LAPAROSCOPY;  Surgeon: Sean Mitchell M.D.;  Location: SURGERY [facility];  Service:    • PELVISCOPY     • CYSTOSCOPY  2012    Performed by QUINTIN LOPES at SURGERY [facility] ORS   • PELVISCOPY  2012    Performed by CHRISTIANO PUGH at SURGERY SAME DAY [facility] ORS   • MYRINGOTOMY     • STRABISMUS REPAIR      gerardo eyes   • TONSILLECTOMY       Past medical history-   Past Medical History:   Diagnosis Date   • Asthma, exercise induced    • Bronchitis    • Chlamydia     Oct 2011   • Cold     02/6/15   • Endometriosis    • Head ache    • Indigestion    • Intractable migraine without aura and without status migrainosus 2017    Referral to neuro   • Other specified symptom associated with female genital organs     endometriosis   • Pain     pelvic    • Pain 8/23/12    stomach   • Psychiatric problem     depression   • Sleep disturbances    • Unspecified disorder of thyroid 2015    hyperactive in the past not taking any meds currently   • Urinary bladder disorder     frequent uti's     Allergies- Ciprofloxacin, Ciprofloxacin, Codeine, Codeine, Doxycycline, Doxycycline, Keflex, and Keflex  Present medications-   Outpatient Encounter Medications as of 12/14/2020   Medication Sig Dispense Refill   • acetaminophen/caffeine/butalbital 300-40-50 mg (FIORICET) -40 MG Cap capsule Take 1-2 Caps by mouth every four hours as needed for Headache for up to 61 days. 45 Cap 1   • metoclopramide (REGLAN) 10 MG Tab 1-3 tab at headache/nausea onset; repeat in 4-6 hours prn 45 Tab 1   • fluticasone (FLONASE) 50 MCG/ACT nasal spray Spray 1 Spray in nose every day. (Patient not taking: Reported on 2/8/2020) 16 g 0   • benzonatate (TESSALON) 100 MG Cap Take 1 Cap by mouth 3 times a day as needed. (Patient not taking: Reported on 2/8/2020) 30 Cap 0   • predniSONE (DELTASONE) 10 MG Tab TAKE 6 TABS BY MOUTH DAILY FOR 2 DAYS, THEN REDUCE BY 1 TAB EVERY OTHER DAY UNTIL OFF (Patient not taking: Reported on 10/22/2019) 45 Tab 0   • BLISOVI 24 FE 1-20 MG-MCG(24) Tab 1 Tab.  11   • naproxen sodium (ANAPROX DS) 550 MG tablet 1-2 tab headache onset; repeat in 4-6 hours prn. (Patient not taking: Reported on 2/8/2020) 90 Tab 3   • propranolol CR (INDERAL LA) 120 MG CAPSULE SR 24 HR Take 1 Cap by mouth every day. (Patient not taking: Reported on 10/22/2019) 90 Cap 3   • sumatriptan (IMITREX) 100 MG tablet 1 tab at headache onset; repeat in 1 hour prn (Patient not taking: Reported on 2/8/2020) 27 Tab 3     No facility-administered encounter medications on file as of 12/14/2020.      Family history- no history of breast or ovarian cancer  Social history-   Social History     Socioeconomic History   • Marital status:      Spouse name: Not on file   • Number of children: Not on file   •  Years of education: Not on file   • Highest education level: Not on file   Occupational History   • Not on file   Social Needs   • Financial resource strain: Not on file   • Food insecurity     Worry: Not on file     Inability: Not on file   • Transportation needs     Medical: Not on file     Non-medical: Not on file   Tobacco Use   • Smoking status: Never Smoker   • Smokeless tobacco: Never Used   Substance and Sexual Activity   • Alcohol use: Yes     Frequency: Monthly or less   • Drug use: No   • Sexual activity: Yes     Partners: Male     Birth control/protection: Condom   Lifestyle   • Physical activity     Days per week: Not on file     Minutes per session: Not on file   • Stress: Not on file   Relationships   • Social connections     Talks on phone: Not on file     Gets together: Not on file     Attends Taoism service: Not on file     Active member of club or organization: Not on file     Attends meetings of clubs or organizations: Not on file     Relationship status: Not on file   • Intimate partner violence     Fear of current or ex partner: Not on file     Emotionally abused: Not on file     Physically abused: Not on file     Forced sexual activity: Not on file   Other Topics Concern   • Not on file   Social History Narrative    ** Merged History Encounter **              Physical examination;   Alert and oriented x3  General-well-developed well-nourished female in no apparent distress  Vitals:    12/14/20 0919   BP: 111/73   Weight: 64.9 kg (143 lb)     Skin is warm and dry   HEENT-normocephalic,nontraumatic,PERRLA,EOMI  Throat- no edema or erythema  Neck-supple  Cardiovascular-regular rate and rhythm, normal S1-S2 without murmurs or gallops  Lungs-clear to auscultation bilaterally  Back-negative CVA tenderness  Abdomen-nondistended positive bowel sounds soft nontender without masses or hepatosplenomegaly  Pelvic examination;  External genitalia-without lesions  Vagina-no blood, no  discharge  Cervix-closed,no gross lesions  Uterus-  18 weeks size, nontender  Adnexa- without mass or tenderness  Extremities-without cyanosis clubbing or edema  Neurologic-grossly intact    Transvaginal ultrasound; as performed and read by myself; intrauterine gestational sac containing brothers pregnancy positive fetal cardiac motion and fetal motion, composite measurements consistent with 18 weeks 2 days, EDC 5/15/2021    Impression;  Dysfunctional uterine bleeding-no evidence of ectopic or miscarriage    Plan;     Needs initial OB      30 Minutes total spent with the patient in face-to-face contact,5 separate minutes of total time utilized to perform  Ultrasound, greater than 50% of the time has been spent on counseling and coordination of care.  Early DU B counseling performed-all questions answered in detail

## 2020-12-15 ENCOUNTER — PATIENT MESSAGE (OUTPATIENT)
Dept: OBGYN | Facility: CLINIC | Age: 25
End: 2020-12-15

## 2020-12-15 NOTE — TELEPHONE ENCOUNTER
"----- Message from Brenda Westbrook sent at 12/15/2020 11:38 AM PST -----  Regarding: Non-Urgent Medical Question  Contact: [phone]  I was reading my notes from my appointment yesterday and I am kind of confused. The first thing I noticed was at the top of the notes it says I had complained of uterine bleeding. I did not. Then at the bottom it says we did a transvaginal ultrasound, again, we did not. And again mentions uterine bleeding under the \"impressions.\" This is concerning to me because uterine bleeding was not mentioned to me during the visit, nor did I tell anybody that I was concerned about uterine bleeding. Can someone call and explain this to me or message me back on here? I am very concerned about this, and would like to know if I have unusual or abnormal uterine bleeding.    12/15/20 1350 Spoke with pt and explained Dx code and complaint for billing purposes. Pt understood and had no other questions or concerns   "

## 2021-01-18 ENCOUNTER — INITIAL PRENATAL (OUTPATIENT)
Dept: OBGYN | Facility: CLINIC | Age: 26
End: 2021-01-18
Payer: COMMERCIAL

## 2021-01-18 ENCOUNTER — HOSPITAL ENCOUNTER (OUTPATIENT)
Facility: MEDICAL CENTER | Age: 26
End: 2021-01-18
Attending: OBSTETRICS & GYNECOLOGY
Payer: COMMERCIAL

## 2021-01-18 VITALS
HEART RATE: 64 BPM | DIASTOLIC BLOOD PRESSURE: 66 MMHG | WEIGHT: 146 LBS | SYSTOLIC BLOOD PRESSURE: 107 MMHG | BODY MASS INDEX: 25.86 KG/M2

## 2021-01-18 DIAGNOSIS — J45.990 ASTHMA, EXERCISE INDUCED: ICD-10-CM

## 2021-01-18 DIAGNOSIS — O09.92 HIGH-RISK PREGNANCY SUPERVISION, SECOND TRIMESTER: ICD-10-CM

## 2021-01-18 DIAGNOSIS — Z98.891 HX OF CESAREAN SECTION: ICD-10-CM

## 2021-01-18 DIAGNOSIS — G43.019 INTRACTABLE MIGRAINE WITHOUT AURA AND WITHOUT STATUS MIGRAINOSUS: ICD-10-CM

## 2021-01-18 DIAGNOSIS — N80.9 ENDOMETRIOSIS DETERMINED BY LAPAROSCOPY: ICD-10-CM

## 2021-01-18 PROBLEM — O09.93 HIGH-RISK PREGNANCY IN THIRD TRIMESTER: Status: RESOLVED | Noted: 2018-02-01 | Resolved: 2021-01-18

## 2021-01-18 PROBLEM — N83.201 RIGHT OVARIAN CYST: Status: RESOLVED | Noted: 2018-03-08 | Resolved: 2021-01-18

## 2021-01-18 PROCEDURE — 88175 CYTOPATH C/V AUTO FLUID REDO: CPT

## 2021-01-18 PROCEDURE — 87491 CHLMYD TRACH DNA AMP PROBE: CPT

## 2021-01-18 PROCEDURE — 87591 N.GONORRHOEAE DNA AMP PROB: CPT

## 2021-01-18 PROCEDURE — 59402 PR NEW OB HIGH RISK: CPT | Performed by: OBSTETRICS & GYNECOLOGY

## 2021-01-18 ASSESSMENT — EDINBURGH POSTNATAL DEPRESSION SCALE (EPDS)
THINGS HAVE BEEN GETTING ON TOP OF ME: NO, MOST OF THE TIME I HAVE COPED QUITE WELL
I HAVE BEEN ANXIOUS OR WORRIED FOR NO GOOD REASON: YES, SOMETIMES
I HAVE BEEN SO UNHAPPY THAT I HAVE HAD DIFFICULTY SLEEPING: NOT AT ALL
I HAVE FELT SCARED OR PANICKY FOR NO GOOD REASON: NO, NOT AT ALL
I HAVE BEEN SO UNHAPPY THAT I HAVE BEEN CRYING: NO, NEVER
I HAVE FELT SAD OR MISERABLE: NO, NOT AT ALL
THE THOUGHT OF HARMING MYSELF HAS OCCURRED TO ME: NEVER
I HAVE BLAMED MYSELF UNNECESSARILY WHEN THINGS WENT WRONG: NOT VERY OFTEN
I HAVE LOOKED FORWARD WITH ENJOYMENT TO THINGS: AS MUCH AS I EVER DID
TOTAL SCORE: 4
I HAVE BEEN ABLE TO LAUGH AND SEE THE FUNNY SIDE OF THINGS: AS MUCH AS I ALWAYS COULD

## 2021-01-18 NOTE — PROGRESS NOTES
Subjective:      Brenda Westbrook is a 25 y.o. female who presents for new OB            HPI patient is a 25-year-old G3, P2 at 23 weeks and 2 days gestation based on ultrasound who presents today for new OB exam.  Patient is without any complaints or concerns.  She reports positive fetal movements.  Denies any pelvic or abdominal pain.  Denies nausea vomiting fevers or dysuria.  Patient has history of migraines for which she takes Fioricet intermittently.  She has appointment for follow-up with neurology.    Reports no history of PID STDs or abnormal Pap smear.  Last Pap smear was .    Patient has had prior 2 C-sections.  Desires repeat  with sterilization this pregnancy.    Patient is taking prenatal vitamins    She reports no personal or family history of known genetic disorder/birth defects or chromosomal abnormality     ROS all organ systems are reviewed and are currently negative       Objective:     /66   Wt 66.2 kg (146 lb)   BMI 25.86 kg/m²      Physical Exam  Vitals signs and nursing note reviewed. Exam conducted with a chaperone present.   Constitutional:       General: She is not in acute distress.     Appearance: Normal appearance. She is normal weight. She is not toxic-appearing.   HENT:      Head: Normocephalic and atraumatic.      Nose: Nose normal.   Neck:      Musculoskeletal: Normal range of motion and neck supple. No neck rigidity.   Cardiovascular:      Rate and Rhythm: Normal rate and regular rhythm.      Pulses: Normal pulses.      Heart sounds: Normal heart sounds. No murmur. No gallop.    Pulmonary:      Effort: Pulmonary effort is normal. No respiratory distress.      Breath sounds: Normal breath sounds. No wheezing.   Chest:      Breasts:         Right: No swelling, bleeding, inverted nipple, mass, nipple discharge or skin change.         Left: No swelling, bleeding, inverted nipple, mass, nipple discharge, skin change or tenderness.   Abdominal:      General:  Abdomen is flat. Bowel sounds are normal. There is no distension.      Palpations: Abdomen is soft.      Tenderness: There is no abdominal tenderness.   Genitourinary:     General: Normal vulva.      Labia:         Right: No rash, tenderness, lesion or injury.         Left: No rash, tenderness, lesion or injury.       Urethra: No prolapse.      Vagina: No vaginal discharge, tenderness, bleeding or lesions.      Cervix: No cervical motion tenderness, discharge or friability.      Uterus: Enlarged. Not tender.       Adnexa:         Right: No mass, tenderness or fullness.          Left: No mass, tenderness or fullness.        Rectum: No external hemorrhoid.   Musculoskeletal: Normal range of motion.      Right lower leg: No edema.   Lymphadenopathy:      Cervical: No cervical adenopathy.      Upper Body:      Right upper body: No supraclavicular or axillary adenopathy.      Left upper body: No supraclavicular or axillary adenopathy.      Lower Body: No right inguinal adenopathy. No left inguinal adenopathy.   Skin:     General: Skin is warm and dry.      Findings: No lesion or rash.   Neurological:      General: No focal deficit present.      Mental Status: She is alert and oriented to person, place, and time.      Gait: Gait normal.   Psychiatric:         Mood and Affect: Mood normal.         Behavior: Behavior normal.         Thought Content: Thought content normal.         Judgment: Judgment normal.                 Assessment/Plan:        1. High-risk pregnancy supervision, second trimester  25-year-old  23 weeks and 2 days gestation here for new OB exam.  Exam is within normal limits  Pregnancy care by trimester discussed.  Pregnancy restrictions reviewed  We discussed exercise and lifting restriction, recommended caloric and fluid intake, recommended weight gain in pregnancy  We discussed screening for birth defects and chromosomal disorders in pregnancy and screening options available.  Desires no screening  at this time.  States she did not get any screening with prior pregnancies.  She will consider genetic screening if there is any abnormality on  Ultrasound  - PRENATAL PANEL 3+HIV+UACXI; Future  - THINPREP RFLX HPV ASCUS W/CTNG; Future  - US-OB 2ND 3RD TRI COMPLETE; Future  - URINE DRUG SCREEN; Future    2. Hx of  section x2  Patient plans for repeat  with sterilization    3. Asthma, exercise induced  Reports no symptoms for more than a year    4. Intractable migraine without aura and without status migrainosus  Patient uses Fioricet intermittently.  Has follow-up with neurology    5. Endometriosis determined by laparoscopy  Reports history of endometriosis.  Denies Pelvic pain symptoms    6.  Pregnancy precautions and plan of care reviewed.  Patient to follow-up in 4 weeks for OB care

## 2021-01-19 DIAGNOSIS — O09.92 HIGH-RISK PREGNANCY SUPERVISION, SECOND TRIMESTER: ICD-10-CM

## 2021-01-24 ENCOUNTER — HOSPITAL ENCOUNTER (OUTPATIENT)
Dept: RADIOLOGY | Facility: MEDICAL CENTER | Age: 26
End: 2021-01-24
Attending: OBSTETRICS & GYNECOLOGY
Payer: COMMERCIAL

## 2021-01-24 DIAGNOSIS — O09.92 HIGH-RISK PREGNANCY SUPERVISION, SECOND TRIMESTER: ICD-10-CM

## 2021-01-24 PROCEDURE — 76805 OB US >/= 14 WKS SNGL FETUS: CPT

## 2021-01-25 ENCOUNTER — PATIENT MESSAGE (OUTPATIENT)
Dept: OBGYN | Facility: CLINIC | Age: 26
End: 2021-01-25

## 2021-01-26 ENCOUNTER — HOSPITAL ENCOUNTER (EMERGENCY)
Facility: MEDICAL CENTER | Age: 26
End: 2021-01-26
Attending: OBSTETRICS & GYNECOLOGY | Admitting: OBSTETRICS & GYNECOLOGY
Payer: COMMERCIAL

## 2021-01-26 ENCOUNTER — DATING (OUTPATIENT)
Dept: OBGYN | Facility: CLINIC | Age: 26
End: 2021-01-26

## 2021-01-26 VITALS
TEMPERATURE: 98 F | DIASTOLIC BLOOD PRESSURE: 63 MMHG | HEART RATE: 91 BPM | BODY MASS INDEX: 25.87 KG/M2 | SYSTOLIC BLOOD PRESSURE: 113 MMHG | RESPIRATION RATE: 16 BRPM | HEIGHT: 63 IN | WEIGHT: 146 LBS

## 2021-01-26 LAB
ABO GROUP BLD: NORMAL
ALBUMIN SERPL BCP-MCNC: 3.5 G/DL (ref 3.2–4.9)
ALBUMIN/GLOB SERPL: 1.5 G/DL
ALP SERPL-CCNC: 52 U/L (ref 30–99)
ALT SERPL-CCNC: 7 U/L (ref 2–50)
ANION GAP SERPL CALC-SCNC: 9 MMOL/L (ref 7–16)
APPEARANCE UR: CLEAR
AST SERPL-CCNC: 13 U/L (ref 12–45)
BASOPHILS # BLD AUTO: 0.3 % (ref 0–1.8)
BASOPHILS # BLD: 0.02 K/UL (ref 0–0.12)
BILIRUB SERPL-MCNC: <0.2 MG/DL (ref 0.1–1.5)
BLD GP AB SCN SERPL QL: NORMAL
BUN SERPL-MCNC: 4 MG/DL (ref 8–22)
CALCIUM SERPL-MCNC: 8.7 MG/DL (ref 8.5–10.5)
CHLORIDE SERPL-SCNC: 106 MMOL/L (ref 96–112)
CO2 SERPL-SCNC: 21 MMOL/L (ref 20–33)
COLOR UR AUTO: YELLOW
CREAT SERPL-MCNC: 0.49 MG/DL (ref 0.5–1.4)
EOSINOPHIL # BLD AUTO: 0.03 K/UL (ref 0–0.51)
EOSINOPHIL NFR BLD: 0.4 % (ref 0–6.9)
ERYTHROCYTE [DISTWIDTH] IN BLOOD BY AUTOMATED COUNT: 48.2 FL (ref 35.9–50)
ERYTHROCYTE [DISTWIDTH] IN BLOOD BY AUTOMATED COUNT: 48.6 FL (ref 35.9–50)
GLOBULIN SER CALC-MCNC: 2.4 G/DL (ref 1.9–3.5)
GLUCOSE SERPL-MCNC: 76 MG/DL (ref 65–99)
GLUCOSE UR QL STRIP.AUTO: NEGATIVE MG/DL
HBV SURFACE AG SER QL: ABNORMAL
HCT VFR BLD AUTO: 32.5 % (ref 37–47)
HCT VFR BLD AUTO: 35.6 % (ref 37–47)
HCV AB SER QL: ABNORMAL
HGB BLD-MCNC: 10.8 G/DL (ref 12–16)
HGB BLD-MCNC: 11.6 G/DL (ref 12–16)
HIV 1+2 AB+HIV1 P24 AG SERPL QL IA: NORMAL
IMM GRANULOCYTES # BLD AUTO: 0.05 K/UL (ref 0–0.11)
IMM GRANULOCYTES NFR BLD AUTO: 0.7 % (ref 0–0.9)
KETONES UR QL STRIP.AUTO: NEGATIVE MG/DL
LEUKOCYTE ESTERASE UR QL STRIP.AUTO: ABNORMAL
LYMPHOCYTES # BLD AUTO: 1.65 K/UL (ref 1–4.8)
LYMPHOCYTES NFR BLD: 22.6 % (ref 22–41)
MCH RBC QN AUTO: 30.9 PG (ref 27–33)
MCH RBC QN AUTO: 31.7 PG (ref 27–33)
MCHC RBC AUTO-ENTMCNC: 32.6 G/DL (ref 33.6–35)
MCHC RBC AUTO-ENTMCNC: 33.2 G/DL (ref 33.6–35)
MCV RBC AUTO: 94.9 FL (ref 81.4–97.8)
MCV RBC AUTO: 95.3 FL (ref 81.4–97.8)
MONOCYTES # BLD AUTO: 0.4 K/UL (ref 0–0.85)
MONOCYTES NFR BLD AUTO: 5.5 % (ref 0–13.4)
NEUTROPHILS # BLD AUTO: 5.14 K/UL (ref 2–7.15)
NEUTROPHILS NFR BLD: 70.5 % (ref 44–72)
NITRITE UR QL STRIP.AUTO: NEGATIVE
NRBC # BLD AUTO: 0 K/UL
NRBC BLD-RTO: 0 /100 WBC
PH UR STRIP.AUTO: 7 [PH] (ref 5–8)
PLATELET # BLD AUTO: 187 K/UL (ref 164–446)
PLATELET # BLD AUTO: 203 K/UL (ref 164–446)
PMV BLD AUTO: 9.6 FL (ref 9–12.9)
PMV BLD AUTO: 9.7 FL (ref 9–12.9)
POTASSIUM SERPL-SCNC: 3.7 MMOL/L (ref 3.6–5.5)
PROT SERPL-MCNC: 5.9 G/DL (ref 6–8.2)
PROT UR QL STRIP: NEGATIVE MG/DL
RBC # BLD AUTO: 3.41 M/UL (ref 4.2–5.4)
RBC # BLD AUTO: 3.75 M/UL (ref 4.2–5.4)
RBC UR QL AUTO: NEGATIVE
RH BLD: NORMAL
RUBV AB SER QL: 292 IU/ML
SODIUM SERPL-SCNC: 136 MMOL/L (ref 135–145)
SP GR UR STRIP.AUTO: 1.02 (ref 1–1.03)
TREPONEMA PALLIDUM IGG+IGM AB [PRESENCE] IN SERUM OR PLASMA BY IMMUNOASSAY: ABNORMAL
WBC # BLD AUTO: 6.9 K/UL (ref 4.8–10.8)
WBC # BLD AUTO: 7.3 K/UL (ref 4.8–10.8)

## 2021-01-26 PROCEDURE — 86762 RUBELLA ANTIBODY: CPT

## 2021-01-26 PROCEDURE — 99282 EMERGENCY DEPT VISIT SF MDM: CPT | Performed by: OBSTETRICS & GYNECOLOGY

## 2021-01-26 PROCEDURE — 85027 COMPLETE CBC AUTOMATED: CPT

## 2021-01-26 PROCEDURE — 87340 HEPATITIS B SURFACE AG IA: CPT

## 2021-01-26 PROCEDURE — 85025 COMPLETE CBC W/AUTO DIFF WBC: CPT

## 2021-01-26 PROCEDURE — 81002 URINALYSIS NONAUTO W/O SCOPE: CPT

## 2021-01-26 PROCEDURE — 86780 TREPONEMA PALLIDUM: CPT

## 2021-01-26 PROCEDURE — 80053 COMPREHEN METABOLIC PANEL: CPT

## 2021-01-26 PROCEDURE — 86900 BLOOD TYPING SEROLOGIC ABO: CPT

## 2021-01-26 PROCEDURE — 99282 EMERGENCY DEPT VISIT SF MDM: CPT

## 2021-01-26 PROCEDURE — 36415 COLL VENOUS BLD VENIPUNCTURE: CPT

## 2021-01-26 PROCEDURE — 86850 RBC ANTIBODY SCREEN: CPT

## 2021-01-26 PROCEDURE — 87389 HIV-1 AG W/HIV-1&-2 AB AG IA: CPT

## 2021-01-26 PROCEDURE — 86901 BLOOD TYPING SEROLOGIC RH(D): CPT

## 2021-01-26 PROCEDURE — 86803 HEPATITIS C AB TEST: CPT

## 2021-01-26 NOTE — ED PROVIDER NOTES
OB H&P:    CC: Headaches + vision change    HPI:  Ms. Brenda Westbrook is a 26 y.o.  @ 24w3d by Ultrasound with a 3 week hx of headaches and epigastric pain. Patient does have a chronic hx of migraines without auras for which she sees a neurologist. She contacted him a few weeks ago with the onset of this most recent bout and was prescribed Fioricet, which has provided no relief. Patient did complain of precipitating nausea prior to the onset, but none since. Yesterday, she started experiencing vision blurriness. The epigastric pain feels more like pressure and is not similar to heartburn, which she experienced in a prior pregnancy. It is relieved occasionally with reclining, but Tylenol does not seem to help. With regards to the headache, the pain is bifrontal, slightly worse behind the right eye, without tearing. Denies congestion, photophobia, phonophobia. Currently rates it at a 7/10. Does not want any narcotics in the ED to help alleviate the pain.    Patient denies EtOH, recreational drug use, and cigarette smoking.    PNC with [facility].       OB History    Para Term  AB Living   3 2 2 0 0 2   SAB TAB Ectopic Molar Multiple Live Births   0 0 0     2      # Outcome Date GA Lbr Bhupinder/2nd Weight Sex Delivery Anes PTL Lv   3 Current            2 Term 09/15/18 40w1d  2.545 kg (5 lb 9.8 oz) M CS-LTranv   MARYSOL   1 Term 16 41w0d  3.09 kg (6 lb 13 oz) M CS-LTranv Spinal N MARYSOL      Birth Comments: double nuchal cord, NRFS      Complications: Fetal Intolerance         Past Medical History:   Diagnosis Date   • Asthma, exercise induced    • Bronchitis    • Chlamydia     Oct 2011   • Cold     02/6/15   • Endometriosis    • Head ache    • Indigestion    • Intractable migraine without aura and without status migrainosus 2017    Referral to neuro   • Other specified symptom associated with female genital organs     endometriosis   • Pain     pelvic   • Pain 12    stomach   • Psychiatric  problem     depression   • Sleep disturbances    • Unspecified disorder of thyroid 2015    hyperactive in the past not taking any meds currently   • Urinary bladder disorder     frequent uti's       Past Surgical History:   Procedure Laterality Date   • REPEAT C SECTION N/A 9/15/2018    Procedure: REPEAT C SECTION;  Surgeon: Rachna Patterson M.D.;  Location: LABOR AND DELIVERY;  Service: Labor and Delivery   • PRIMARY C SECTION Bilateral 6/11/2016    Procedure: PRIMARY C SECTION;  Surgeon: Trever Beckman M.D.;  Location: LABOR AND DELIVERY;  Service:    • MANNY BY LAPAROSCOPY  3/12/2015    Procedure: MANNY BY LAPAROSCOPY;  Surgeon: Sean Mitchell M.D.;  Location: SURGERY [facility] ORS;  Service:    • PELVISCOPY  2013   • CYSTOSCOPY  8/30/2012    Performed by QUINTIN LOPES at SURGERY [facility] ORS   • PELVISCOPY  4/19/2012    Performed by CHRISTIANO PUGH at SURGERY SAME DAY [facility] ORS   • MYRINGOTOMY     • STRABISMUS REPAIR      gerardo eyes   • TONSILLECTOMY         No current facility-administered medications on file prior to encounter.      Current Outpatient Medications on File Prior to Encounter   Medication Sig Dispense Refill   • Prenatal MV-Min-Fe Fum-FA-DHA (PRENATAL 1 PO) Take  by mouth.     • fluticasone (FLONASE) 50 MCG/ACT nasal spray Spray 1 Spray in nose every day. (Patient not taking: Reported on 2/8/2020) 16 g 0   • benzonatate (TESSALON) 100 MG Cap Take 1 Cap by mouth 3 times a day as needed. (Patient not taking: Reported on 2/8/2020) 30 Cap 0   • predniSONE (DELTASONE) 10 MG Tab TAKE 6 TABS BY MOUTH DAILY FOR 2 DAYS, THEN REDUCE BY 1 TAB EVERY OTHER DAY UNTIL OFF (Patient not taking: Reported on 10/22/2019) 45 Tab 0   • BLISOVI 24 FE 1-20 MG-MCG(24) Tab 1 Tab.  11   • metoclopramide (REGLAN) 10 MG Tab 1-3 tab at headache/nausea onset; repeat in 4-6 hours prn (Patient not taking: Reported on 1/18/2021) 45 Tab 1   • naproxen sodium (ANAPROX DS) 550 MG tablet 1-2 tab  "headache onset; repeat in 4-6 hours prn. (Patient not taking: Reported on 2020) 90 Tab 3   • propranolol CR (INDERAL LA) 120 MG CAPSULE SR 24 HR Take 1 Cap by mouth every day. (Patient not taking: Reported on 10/22/2019) 90 Cap 3   • sumatriptan (IMITREX) 100 MG tablet 1 tab at headache onset; repeat in 1 hour prn (Patient not taking: Reported on 2020) 27 Tab 3       Family History   Problem Relation Age of Onset   • Other Mother         Migraine   • Other Brother         Migraine   • Diabetes Other    • Hypertension Other    • Stroke Other    • Cancer Other    • Heart Disease Other        Social History     Tobacco Use   • Smoking status: Never Smoker   • Smokeless tobacco: Never Used   Substance Use Topics   • Alcohol use: Yes     Frequency: Monthly or less   • Drug use: No         PE:  Vitals:    21 1042   BP: 113/63   Pulse: 91   Resp: 16   Temp: 36.7 °C (98 °F)   TempSrc: Axillary   Weight: 66.2 kg (146 lb)   Height: 1.6 m (5' 3\")     gen: AAO, NAD  Neuro: PERRL, CN 3 lagging with horizontal nystagmus, CN 5-12 intact  abd: soft, gravid, epigastric tenderness to deep palpation  CVS: Reg rate and rhythm  Ext: NT, no edema    SVE: Deferred  FHT: Baseline 150 with minimal variability/Pos accels/ Neg decels  Veda: No contractions    A/P: 26 y.o.  @ 24w3d by Ultrasound with a 3 week hx of worsening headaches and epigastric pain. Patient offered pain medicine, but declined several times. Neuro exam remarkable for horizontal nystagmus and lagging of extraocular movements. Vitals normal, but BP elevated above patient's reported normal of 80s systolic. Labs unremarkable for PIH. Etiology of current presentation likely related to patient's chronic migraine hx. Has been responsive in the past to Sumatriptan and steroid short course. Appointment with neurologist scheduled for next Friday. Patient advised to continue with Fioricet in the meantime as suffering through pain can raise catecholamine level and " hence compromise blood supply to fetus. Encouraged to continue hydrating with up to 2L per day. Return to ER if condition becomes rapidly worse.     Dispo: Discharge home. Follow up with neurologist next week.     Ruth Heath MD  Family Medicine Resident, PGY-1

## 2021-01-26 NOTE — PROGRESS NOTES
EDC   5/15/2021    EGA   24w3d    1037: TOCO/US applied, pt educated. Pt presents with c/o severe headaches the last 5 days. Pt states she has been having headaches for the past 3 weeks, but the last 5 days the headaches have become more severe. Pt rates headaches at a 7 using a pain scale 0 to 10.  Pt states she has been having blurry of vision and epigastric pain for the last couple of days. Pt declines any new swelling. DTR's +2, no clonus noted.     1045: Fetal heart tones doppler at 150 bpm    1400: Resident MALINI Heath reviewed labs, orders received to discharge patient home.     1410: Discharge instructions gone over with patient, all questions and concerns addressed.     1412: Resident MALINI Heath notified about patient's appointment today for prenatal labs, orders received to have prenatal labs drawn here before discharge.

## 2021-01-26 NOTE — TELEPHONE ENCOUNTER
From: Brenda Westbrook  To: Janusz Calero M.D.  Sent: 1/25/2021  9:34 AM PST  Subject: Non-Urgent Medical Question    Good morning. I have a question, and I wanted to ask before I just went in to be checked. I got a really bad headache two days ago, and nothing I do if helping it. I took 3 doses of Fioricet (per the bottle's instructions) yesterday and it did not help at all. This morning I woke up extremely dizzy and I have floaters in my eyes. Is this something I need to get checked out? Like I said, I don't want to go to Labor and Delivery if it's not necessary. So I figured I would ask first.     Thanks in advance!      Consulted with Dr. Calero and advised for pt to go to L&D to be assess but also to contact her neurologist to adjust her meds.   Called pt and notified as above. Pt agreed and verbalized understanding.

## 2021-02-05 ENCOUNTER — OFFICE VISIT (OUTPATIENT)
Dept: NEUROLOGY | Facility: MEDICAL CENTER | Age: 26
End: 2021-02-05
Attending: PSYCHIATRY & NEUROLOGY
Payer: COMMERCIAL

## 2021-02-05 VITALS
DIASTOLIC BLOOD PRESSURE: 72 MMHG | RESPIRATION RATE: 14 BRPM | OXYGEN SATURATION: 97 % | HEIGHT: 63 IN | SYSTOLIC BLOOD PRESSURE: 122 MMHG | HEART RATE: 95 BPM | WEIGHT: 151.46 LBS | BODY MASS INDEX: 26.84 KG/M2 | TEMPERATURE: 98 F

## 2021-02-05 DIAGNOSIS — G43.019 INTRACTABLE MIGRAINE WITHOUT AURA AND WITHOUT STATUS MIGRAINOSUS: Primary | ICD-10-CM

## 2021-02-05 PROCEDURE — 99212 OFFICE O/P EST SF 10 MIN: CPT | Performed by: PSYCHIATRY & NEUROLOGY

## 2021-02-05 PROCEDURE — 99214 OFFICE O/P EST MOD 30 MIN: CPT | Performed by: PSYCHIATRY & NEUROLOGY

## 2021-02-05 RX ORDER — BUTALBITAL, ACETAMINOPHEN AND CAFFEINE 50; 325; 40 MG/1; MG/1; MG/1
1 TABLET ORAL EVERY 4 HOURS PRN
COMMUNITY
End: 2021-02-05

## 2021-02-05 RX ORDER — PROPRANOLOL HYDROCHLORIDE 120 MG/1
120 CAPSULE, EXTENDED RELEASE ORAL DAILY
Qty: 90 CAP | Refills: 3 | Status: SHIPPED | OUTPATIENT
Start: 2021-02-05 | End: 2021-10-01

## 2021-02-05 RX ORDER — BUTALBITAL, ACETAMINOPHEN, CAFFEINE AND CODEINE PHOSPHATE 50; 325; 40; 30 MG/1; MG/1; MG/1; MG/1
1 CAPSULE ORAL EVERY 4 HOURS PRN
Qty: 30 CAP | Refills: 0 | Status: SHIPPED | OUTPATIENT
Start: 2021-02-05 | End: 2021-03-05

## 2021-02-05 ASSESSMENT — ENCOUNTER SYMPTOMS
HEADACHES: 1
MEMORY LOSS: 0

## 2021-02-05 ASSESSMENT — FIBROSIS 4 INDEX: FIB4 SCORE: 0.65

## 2021-02-06 NOTE — PROGRESS NOTES
"Subjective:      Brenda Westbrook is a 26 y.o. female who presents for follow-up, with a history of migraine without aura, and who is now almost into her third trimester, with an increase in headaches.    HPI    Last seen in , on Inderal  mg daily, she actually had been doing very well with very infrequent migraines.  She had begun to suffer from daily moderate headaches, had also been placed on an estrogen-based contraceptive, and I had recommended seeing if that could be changed to a progestin based contraception, and we used a steroid titration which helped the headaches attenuate at that time.    Since then, the headaches have continued to bounce around, but with her recent pregnancy over the last 6 months, she has stopped all of her medications including her Inderal, Imitrex, Anaprox, etc.  The pregnancy itself is doing well, but the headaches have taken off.  This was the pattern with her 3 previous pregnancies.    She is having daily headaches, these are mostly moderate in intensity, twice a month she is having a severe migraine headache which will last for couple of days.  Previous rescue was at work, now on Fioricet only, the tablet provides no benefit whatsoever.  She is now forced with toughing it out.    Medical, surgical and family histories are reviewed, there are no new drug allergies.  Her EDC is May 7, 2021, she is scheduled for elective .  Pregnancy itself has not been complicated with PIH, preeclampsia, gestational diabetes, etc.  She is basically on prenatal vitamins, Fioricet as needed.    Review of Systems   Neurological: Positive for headaches.   Psychiatric/Behavioral: Negative for memory loss.   All other systems reviewed and are negative.       Objective:     /72 (BP Location: Right arm, Patient Position: Sitting, BP Cuff Size: Adult)   Pulse 95   Temp 36.7 °C (98 °F) (Temporal)   Resp 14   Ht 1.6 m (5' 3\")   Wt 68.7 kg (151 lb 7.3 oz)   SpO2 97%   " BMI 26.83 kg/m²      Physical Exam    She appears in no acute distress.  Vital signs are stable.  There is no malar rash, jaw or temporal tenderness, or jaw claudication.  There is no allodynia.  Her neck is supple, range of motion is full.  There is some tenderness of the cervical paraspinal and trapezius muscle bodies on the left, the occipital ridge is tender slightly on palpation.  Cardiac evaluation is unremarkable.   There is no lower extremity edema.    Including mental status, cranial nerves, musculoskeletal, coordination, and since evaluations, her examination is intact.  Reflexes are very brisk, there are no pathologic reflexes, both toes are downgoing.     Assessment/Plan:     1. Intractable migraine without aura and without status migrainosus  Beta-blockers are safe with pregnancy, I will reintroduce the old dose of Inderal  mg daily.  Blood pressures with her pregnancy have been (relatively) elevated, but she was told to watch what happens.  We talked about the new CGRP rescue medications, Nurtec and Ubrelvy, and the very low likelihood (theoretically based) of any type of obstetrical or congenital complication, she is opting for Fioricet with codeine.  Narcotics unfortunately create problems with pruritus, hopefully she can work around this.  Once the pregnancy is complete, the headaches will return to a baseline from which hopefully Inderal  mg daily is all that will be required.  Face-to-face time spent talking about all the above.  We will follow-up in the next 4 months or so.    - propranolol CR (INDERAL LA) 120 MG CAPSULE SR 24 HR; Take 1 Cap by mouth every day.  Dispense: 90 Cap; Refill: 3  - butalbital-acetaminophen-caffeine-codeine (FIORICET W/CODEINE) -29-30 MG per capsule; Take 1 Cap by mouth every four hours as needed for Headache for up to 28 days.  Dispense: 30 Cap; Refill: 0    Time: 25-minute spent face-to-face for exam, review, discussion, and education, of this over  50% of the time spent counseling and coordinating care.

## 2021-02-16 ENCOUNTER — ROUTINE PRENATAL (OUTPATIENT)
Dept: OBGYN | Facility: CLINIC | Age: 26
End: 2021-02-16
Payer: COMMERCIAL

## 2021-02-16 VITALS — WEIGHT: 151 LBS | SYSTOLIC BLOOD PRESSURE: 93 MMHG | BODY MASS INDEX: 26.75 KG/M2 | DIASTOLIC BLOOD PRESSURE: 60 MMHG

## 2021-02-16 DIAGNOSIS — O09.92 HIGH-RISK PREGNANCY SUPERVISION, SECOND TRIMESTER: ICD-10-CM

## 2021-02-16 PROCEDURE — 90471 IMMUNIZATION ADMIN: CPT | Performed by: OBSTETRICS & GYNECOLOGY

## 2021-02-16 PROCEDURE — 90040 PR PRENATAL FOLLOW UP: CPT | Performed by: OBSTETRICS & GYNECOLOGY

## 2021-02-16 PROCEDURE — 90715 TDAP VACCINE 7 YRS/> IM: CPT | Performed by: OBSTETRICS & GYNECOLOGY

## 2021-02-16 ASSESSMENT — FIBROSIS 4 INDEX: FIB4 SCORE: 0.65

## 2021-02-16 NOTE — PROGRESS NOTES
Pt here today for OB follow up  Pt states no complaints  Reports +FM  Good # [phone]  Pharmacy Confirmed.  Chaperone offered and provided.   Pt desires Tdap  Pt received Flu vaccine 11/29/20 at [facility]  MARY sheet instructions given  Tdap vaccine given. Right Deltoid. VIS given and screening check list reviewed with pt. Vaccine verified by AO

## 2021-02-16 NOTE — PROGRESS NOTES
OB Follow Up--- High Risk  Prev C/S     Desires Sterilization       26 y.o. is a 27w3d presents in prenatal follow up.    Subjective:no complaints  . Fetal Movement  positive    Problem List:has Asthma, exercise induced; Intractable migraine without aura and without status migrainosus; Endometriosis determined by laparoscopy; Biological false positive RPR test; Hx of  section x2; S/P  section; and High-risk pregnancy supervision, second trimester on their problem list.     Objective:   BP (!) 93/60   Wt 68.5 kg (151 lb)   BMI 26.75 kg/m²     Abdomen:  S=D  Extremities:Normal        Lab:No results found for this or any previous visit (from the past 336 hour(s)).      Assessment:    27w3d     High Risk  Prev C/S     Desires Sterilization   No pain, bleeding, unusual discharge or leeking    Plan:  3 weeks   Schedule Repeat C/S , BS   Sign papers   Continue water intake  Ambulate nightly after 37 weeks  Continue Kick counts

## 2021-02-16 NOTE — LETTER
"Count Your Baby's Movements  Another step to a healthy delivery    Brenda Dariana             Dept: [phone]    How Many Weeks Pregnant? 27w3d    Date to Begin Countin21              How to use this chart    One way for your physician to keep track of your baby's health is by knowing how often the baby moves (or \"kicks\") in your womb.  You can help your physician to do this by using this chart every day.    Every day, you should see how many hours it takes for your baby to move 10 times.  Start in the morning, as soon as you get up.    · First, write down the time your baby moves until you get to 10.  · Check off one box every time your baby moves until you get to 10.  · Write down the time you finished counting in the last column.  · Total how long it took to count up all 10 movements.  · Finally, fill in the box that shows how long this took.  After counting 10 movements, you no longer have to count any more that day.  The next morning, just start counting again as soon as you get up.    What should you call a \"movement\"?  It is hard to say, because it will feel different from one mother to another and from one pregnancy to the next.  The important thing is that you count the movements the same way throughout your pregnancy.  If you have more questions, you should ask your physician.    Count carefully every day!  SAMPLE:  Week 28    How many hours did it take to feel 10 movements?       Start  Time     1     2     3     4     5     6     7     8     9     10   Finish Time   Mon 8:20 ·  ·  ·  ·  ·  ·  ·  ·  ·  ·  11:40                  Sat               Sun                 IMPORTANT: You should contact your physician if it takes more than two hours for you to feel 10 movements.  Each morning, write down the time and start to count the movements of your baby.  Keep track by checking off one box every time you feel one movement.  When you have " "felt 10 \"kicks\", write down the time you finished counting in the last column.  Then fill in the   box (over the check edgar) for the number of hours it took.  Be sure to read the complete instructions on the previous page.            "

## 2021-02-23 ENCOUNTER — PATIENT MESSAGE (OUTPATIENT)
Dept: OBGYN | Facility: CLINIC | Age: 26
End: 2021-02-23

## 2021-02-23 NOTE — TELEPHONE ENCOUNTER
I called and spoke with Pt, asked if Pt is still having issues with her kick counts. Pt stated that it is been a week that she had started doing the kick count sheet and she was not getting 10 kicks for the 2 hour period of time. Advised Pt that she can start doing her kick count sheet after a meal, focus on checking the baby's kicks, and that there should be no TV or even phone. Pt stated that whenever she does her kick count sheet that she was on her phone. Advised Pt that if she is still not getting any 10 kicks between the 2 hour period of time that she can eat something or drink cold water or juice, and she can lay on her left side. Informed Pt that if this still does not work for her that she can let us know as soon as possible, and provided phone number that she can call if we are not available. Provided Pt ph#[phone] Triage line and ph#[phone] L&D. Pt understood and has no further questions.       ---- Message from Brenda Westbrook sent at 2/23/2021  8:34 AM PST -----  Regarding: Non-Urgent Medical Question  Contact: [phone]  Good morning, at my last appointment on the 17th, we started doing kick counts. I have noticed the baby move throughout the day, I'm not really keeping track, but when I lay down to do kick counts, I am definitely not ever getting 10 kicks in 2 hours. Is this something to be concerned about since I feel her move throughout the day? I had this problem with my oldest, but not until about 32 weeks. I'll be 29 weeks this Friday.

## 2021-03-02 ENCOUNTER — ROUTINE PRENATAL (OUTPATIENT)
Dept: OBGYN | Facility: CLINIC | Age: 26
End: 2021-03-02
Payer: COMMERCIAL

## 2021-03-02 VITALS — SYSTOLIC BLOOD PRESSURE: 98 MMHG | BODY MASS INDEX: 27.1 KG/M2 | WEIGHT: 153 LBS | DIASTOLIC BLOOD PRESSURE: 67 MMHG

## 2021-03-02 DIAGNOSIS — G43.019 INTRACTABLE MIGRAINE WITHOUT AURA AND WITHOUT STATUS MIGRAINOSUS: ICD-10-CM

## 2021-03-02 DIAGNOSIS — J45.990 ASTHMA, EXERCISE INDUCED: ICD-10-CM

## 2021-03-02 DIAGNOSIS — Z98.891 HX OF CESAREAN SECTION: ICD-10-CM

## 2021-03-02 DIAGNOSIS — O09.93 HIGH-RISK PREGNANCY IN THIRD TRIMESTER: ICD-10-CM

## 2021-03-02 PROCEDURE — 90040 PR PRENATAL FOLLOW UP: CPT | Performed by: OBSTETRICS & GYNECOLOGY

## 2021-03-02 RX ORDER — BREAST PUMP
EACH MISCELLANEOUS
Qty: 1 EACH | Refills: 0 | Status: SHIPPED | OUTPATIENT
Start: 2021-03-02 | End: 2022-03-14

## 2021-03-02 ASSESSMENT — FIBROSIS 4 INDEX: FIB4 SCORE: 0.65

## 2021-03-02 NOTE — PROGRESS NOTES
OB follow up   +FM, Denies VB, LOF or UC's  Phone # [phone]  Preferred pharmacy confirmed  3rd trimester lab orders pended and instructions given to patient  Pt report she does not get 10 MARY in 2 hrs.

## 2021-03-02 NOTE — PROGRESS NOTES
S: Pt presents for routine OB follow up.  Patient is doing well currently.  Reports good fetal movement.  No headaches or scotoma.  Reports normal bowel and bladder functions  No contractions, vaginal bleeding, or leakage of fluid.  We discussed breast-feeding and prescription for breast pump was given.  Patient has had 2 prior C-sections and is scheduled for repeat  and she also desires sterilization and consents are in chart.  Questions answered.    O: BP (!) 98/67   Wt 69.4 kg (153 lb)   BMI 27.10 kg/m²   Patients' weight gain, fluid intake and exercise level discussed.  Vitals, fundal height , fetal position, and FHR reviewed on flowsheet    Lab:No results found for this or any previous visit (from the past 336 hour(s)).    A/P:  26 y.o.  at 29w3d presents for routine obstetric follow-up.  Doing well currently  Size equals dates.   Encounter Diagnoses   Name Primary?   • High-risk pregnancy in third trimester    • Hx of  section x2    • Asthma, exercise induced    • Intractable migraine without aura and without status migrainosus    • Lactating mother          1.  Continue prenatal vitamins.  2.  Fetal kick counts reviewed and instructions were provided.  3.  Exercise at least 30 minutes daily.  4.  Drink at least 2L of water daily  5.  Pregnancy and  labor precautions educated.  6.  Follow-up in 2 weeks.  7.  28-week labs discussed and lab slips were given

## 2021-03-10 ENCOUNTER — HOSPITAL ENCOUNTER (OUTPATIENT)
Dept: LAB | Facility: MEDICAL CENTER | Age: 26
End: 2021-03-10
Attending: OBSTETRICS & GYNECOLOGY
Payer: COMMERCIAL

## 2021-03-10 DIAGNOSIS — O09.93 HIGH-RISK PREGNANCY IN THIRD TRIMESTER: ICD-10-CM

## 2021-03-10 LAB
BASOPHILS # BLD AUTO: 0.3 % (ref 0–1.8)
BASOPHILS # BLD: 0.02 K/UL (ref 0–0.12)
EOSINOPHIL # BLD AUTO: 0.04 K/UL (ref 0–0.51)
EOSINOPHIL NFR BLD: 0.6 % (ref 0–6.9)
ERYTHROCYTE [DISTWIDTH] IN BLOOD BY AUTOMATED COUNT: 51.3 FL (ref 35.9–50)
GLUCOSE 1H P 50 G GLC PO SERPL-MCNC: 128 MG/DL (ref 70–139)
HCT VFR BLD AUTO: 33.8 % (ref 37–47)
HGB BLD-MCNC: 10.9 G/DL (ref 12–16)
IMM GRANULOCYTES # BLD AUTO: 0.06 K/UL (ref 0–0.11)
IMM GRANULOCYTES NFR BLD AUTO: 0.8 % (ref 0–0.9)
LYMPHOCYTES # BLD AUTO: 1.32 K/UL (ref 1–4.8)
LYMPHOCYTES NFR BLD: 18.6 % (ref 22–41)
MCH RBC QN AUTO: 31.5 PG (ref 27–33)
MCHC RBC AUTO-ENTMCNC: 32.2 G/DL (ref 33.6–35)
MCV RBC AUTO: 97.7 FL (ref 81.4–97.8)
MONOCYTES # BLD AUTO: 0.53 K/UL (ref 0–0.85)
MONOCYTES NFR BLD AUTO: 7.5 % (ref 0–13.4)
NEUTROPHILS # BLD AUTO: 5.11 K/UL (ref 2–7.15)
NEUTROPHILS NFR BLD: 72.2 % (ref 44–72)
NRBC # BLD AUTO: 0 K/UL
NRBC BLD-RTO: 0 /100 WBC
PLATELET # BLD AUTO: 175 K/UL (ref 164–446)
PMV BLD AUTO: 10.6 FL (ref 9–12.9)
RBC # BLD AUTO: 3.46 M/UL (ref 4.2–5.4)
TREPONEMA PALLIDUM IGG+IGM AB [PRESENCE] IN SERUM OR PLASMA BY IMMUNOASSAY: NORMAL
WBC # BLD AUTO: 7.1 K/UL (ref 4.8–10.8)

## 2021-03-10 PROCEDURE — 36415 COLL VENOUS BLD VENIPUNCTURE: CPT

## 2021-03-10 PROCEDURE — 82950 GLUCOSE TEST: CPT

## 2021-03-10 PROCEDURE — 85025 COMPLETE CBC W/AUTO DIFF WBC: CPT

## 2021-03-10 PROCEDURE — 86780 TREPONEMA PALLIDUM: CPT

## 2021-03-15 ENCOUNTER — TELEPHONE (OUTPATIENT)
Dept: OBGYN | Facility: CLINIC | Age: 26
End: 2021-03-15

## 2021-03-15 NOTE — TELEPHONE ENCOUNTER
----- Message from Janusz Calero M.D. sent at 3/12/2021  8:19 AM PST -----  Labs normal except for mild anemia.  Patient should take iron 1-2 times daily with food    3/15/21 1348 Pt notified of need to start on Iron supplementation. Recommended to take it with orange juice, to avoid milk products 1hr before and 2hr after. Not to take with PNV. To increase water intake to decrease side effects of constipation. Pt verbalized understanding

## 2021-03-16 ENCOUNTER — ROUTINE PRENATAL (OUTPATIENT)
Dept: OBGYN | Facility: CLINIC | Age: 26
End: 2021-03-16
Payer: COMMERCIAL

## 2021-03-16 VITALS — BODY MASS INDEX: 27.49 KG/M2 | SYSTOLIC BLOOD PRESSURE: 107 MMHG | DIASTOLIC BLOOD PRESSURE: 65 MMHG | WEIGHT: 155.2 LBS

## 2021-03-16 DIAGNOSIS — O09.93 HIGH-RISK PREGNANCY IN THIRD TRIMESTER: ICD-10-CM

## 2021-03-16 PROCEDURE — 90040 PR PRENATAL FOLLOW UP: CPT | Performed by: OBSTETRICS & GYNECOLOGY

## 2021-03-16 ASSESSMENT — FIBROSIS 4 INDEX: FIB4 SCORE: 0.76

## 2021-03-16 NOTE — PROGRESS NOTES
OB Follow Up--- High Risk  Prev C/S     Desires Sterilization     Migraines   26 y.o. is a 31w3d presents in prenatal follow up.    Subjective:Some UC's   . Fetal Movement  positive    Problem List:has Asthma, exercise induced; Intractable migraine without aura and without status migrainosus; Endometriosis determined by laparoscopy; Biological false positive RPR test; Hx of  section x2; S/P  section; and High-risk pregnancy in third trimester on their problem list.     Objective:   /65   Wt 70.4 kg (155 lb 3.2 oz)   BMI 27.49 kg/m²     Abdomen:  S=D  Extremities:Normal        Lab:  Recent Results (from the past 336 hour(s))   CBC WITH DIFFERENTIAL    Collection Time: 03/10/21  9:43 AM   Result Value Ref Range    WBC 7.1 4.8 - 10.8 K/uL    RBC 3.46 (L) 4.20 - 5.40 M/uL    Hemoglobin 10.9 (L) 12.0 - 16.0 g/dL    Hematocrit 33.8 (L) 37.0 - 47.0 %    MCV 97.7 81.4 - 97.8 fL    MCH 31.5 27.0 - 33.0 pg    MCHC 32.2 (L) 33.6 - 35.0 g/dL    RDW 51.3 (H) 35.9 - 50.0 fL    Platelet Count 175 164 - 446 K/uL    MPV 10.6 9.0 - 12.9 fL    Neutrophils-Polys 72.20 (H) 44.00 - 72.00 %    Lymphocytes 18.60 (L) 22.00 - 41.00 %    Monocytes 7.50 0.00 - 13.40 %    Eosinophils 0.60 0.00 - 6.90 %    Basophils 0.30 0.00 - 1.80 %    Immature Granulocytes 0.80 0.00 - 0.90 %    Nucleated RBC 0.00 /100 WBC    Neutrophils (Absolute) 5.11 2.00 - 7.15 K/uL    Lymphs (Absolute) 1.32 1.00 - 4.80 K/uL    Monos (Absolute) 0.53 0.00 - 0.85 K/uL    Eos (Absolute) 0.04 0.00 - 0.51 K/uL    Baso (Absolute) 0.02 0.00 - 0.12 K/uL    Immature Granulocytes (abs) 0.06 0.00 - 0.11 K/uL    NRBC (Absolute) 0.00 K/uL   T.PALLIDUM AB EIA    Collection Time: 03/10/21  9:43 AM   Result Value Ref Range    Syphilis, Treponemal Qual Non-Reactive Non-Reactive   GLUCOSE 1HR GESTATIONAL    Collection Time: 03/10/21  9:43 AM   Result Value Ref Range    Glucose, Post Dose 128 70 - 139 mg/dL         Assessment:    31w3d     - High Risk  Prev C/S      Desires Sterilization     Migraines   No pain, bleeding, unusual discharge or leeking    Plan:  2 weeks  Continue water intake  Ambulate nightly after 37 weeks  Continue Kick counts

## 2021-03-16 NOTE — PROGRESS NOTES
Pt's here for OB follow-up  Reports + fetal movement  No VB, LOF or UC's.  Good Phone #:[phone]  Pharmacy verified.   Pt states has had heavy ctx once last wk and been having cramps for on and off.  Pt states no complaints for today.  Patient is scheduled for C/S on 05/09/21 with Dr. Alvarenga at 9:30am, info given to Pt.

## 2021-03-27 ENCOUNTER — HOSPITAL ENCOUNTER (EMERGENCY)
Facility: MEDICAL CENTER | Age: 26
End: 2021-03-28
Attending: OBSTETRICS & GYNECOLOGY | Admitting: OBSTETRICS & GYNECOLOGY
Payer: COMMERCIAL

## 2021-03-27 VITALS
OXYGEN SATURATION: 96 % | BODY MASS INDEX: 27.46 KG/M2 | WEIGHT: 155 LBS | SYSTOLIC BLOOD PRESSURE: 126 MMHG | TEMPERATURE: 97 F | HEIGHT: 63 IN | RESPIRATION RATE: 20 BRPM | DIASTOLIC BLOOD PRESSURE: 74 MMHG | HEART RATE: 99 BPM

## 2021-03-27 LAB
APPEARANCE UR: ABNORMAL
COLOR UR AUTO: ABNORMAL
FIBRONECTIN FETAL SPEC QL: NEGATIVE
GLUCOSE UR QL STRIP.AUTO: NEGATIVE MG/DL
KETONES UR QL STRIP.AUTO: 40 MG/DL
LEUKOCYTE ESTERASE UR QL STRIP.AUTO: NEGATIVE
NITRITE UR QL STRIP.AUTO: NEGATIVE
PH UR STRIP.AUTO: 6 [PH] (ref 5–8)
PROT UR QL STRIP: 30 MG/DL
RBC UR QL AUTO: ABNORMAL
SP GR UR STRIP.AUTO: 1.02 (ref 1–1.03)

## 2021-03-27 PROCEDURE — 99284 EMERGENCY DEPT VISIT MOD MDM: CPT

## 2021-03-27 PROCEDURE — 700102 HCHG RX REV CODE 250 W/ 637 OVERRIDE(OP): Performed by: NURSE PRACTITIONER

## 2021-03-27 PROCEDURE — 82731 ASSAY OF FETAL FIBRONECTIN: CPT

## 2021-03-27 PROCEDURE — 700105 HCHG RX REV CODE 258: Performed by: NURSE PRACTITIONER

## 2021-03-27 PROCEDURE — 59025 FETAL NON-STRESS TEST: CPT

## 2021-03-27 PROCEDURE — 81002 URINALYSIS NONAUTO W/O SCOPE: CPT

## 2021-03-27 PROCEDURE — 700105 HCHG RX REV CODE 258: Performed by: OBSTETRICS & GYNECOLOGY

## 2021-03-27 PROCEDURE — A9270 NON-COVERED ITEM OR SERVICE: HCPCS | Performed by: NURSE PRACTITIONER

## 2021-03-27 RX ORDER — SODIUM CHLORIDE, SODIUM LACTATE, POTASSIUM CHLORIDE, AND CALCIUM CHLORIDE .6; .31; .03; .02 G/100ML; G/100ML; G/100ML; G/100ML
1000 INJECTION, SOLUTION INTRAVENOUS ONCE
Status: COMPLETED | OUTPATIENT
Start: 2021-03-27 | End: 2021-03-27

## 2021-03-27 RX ORDER — NIFEDIPINE 10 MG/1
10 CAPSULE ORAL ONCE
Status: COMPLETED | OUTPATIENT
Start: 2021-03-27 | End: 2021-03-27

## 2021-03-27 RX ADMIN — NIFEDIPINE 10 MG: 10 CAPSULE ORAL at 22:57

## 2021-03-27 RX ADMIN — SODIUM CHLORIDE, POTASSIUM CHLORIDE, SODIUM LACTATE AND CALCIUM CHLORIDE 1000 ML: 600; 310; 30; 20 INJECTION, SOLUTION INTRAVENOUS at 21:21

## 2021-03-27 RX ADMIN — SODIUM CHLORIDE, POTASSIUM CHLORIDE, SODIUM LACTATE AND CALCIUM CHLORIDE 1000 ML: 600; 310; 30; 20 INJECTION, SOLUTION INTRAVENOUS at 22:40

## 2021-03-27 ASSESSMENT — FIBROSIS 4 INDEX: FIB4 SCORE: 0.76

## 2021-03-28 PROBLEM — O23.43 URINARY TRACT INFECTION IN MOTHER DURING THIRD TRIMESTER OF PREGNANCY: Status: ACTIVE | Noted: 2021-03-28

## 2021-03-28 PROCEDURE — 99281 EMR DPT VST MAYX REQ PHY/QHP: CPT | Performed by: NURSE PRACTITIONER

## 2021-03-28 RX ORDER — ONDANSETRON 4 MG/1
4 TABLET, FILM COATED ORAL EVERY 4 HOURS PRN
Qty: 15 TABLET | Refills: 0 | Status: ON HOLD | OUTPATIENT
Start: 2021-03-28 | End: 2021-05-13

## 2021-03-28 RX ORDER — NITROFURANTOIN 25; 75 MG/1; MG/1
100 CAPSULE ORAL 2 TIMES DAILY
Qty: 14 CAPSULE | Refills: 0 | Status: SHIPPED | OUTPATIENT
Start: 2021-03-28 | End: 2021-04-04

## 2021-03-28 NOTE — DISCHARGE INSTRUCTIONS
General Instructions:  · If you think you are in labor, time contractions (lying on your left side) from the beginning of one contraction to the beginning of the next contraction for at least one hour.  · Increase fluid intake: you should consume 10-12 8 oz glasses of non-caffeinated fluid per day.  · Report any pressure or burning on urination to your physician.  · Monitor fetal movement: If you notice an absence or decrease in fetal movement, drink a large glass of water and rest on your side.  If there is no increase in movement, call your physician or go to the hospital for further evaluation.  · Report any sudden, sharp abdominal pain.  · Report any bleeding.  Spotting or pinkish discharge is normal after vaginal exam.  You may also spot after sexual intercourse.    Pre-term Labor (<37 weeks):  Call your physician or return to the hospital if:  · You have painless regular contractions more than 4 in one hour.  · Your water breaks (remember time and color).  · You have menstrual-like cramps, a low dull backache or pressure in your pelvis or back.  · Your baby does not move enough to complete the daily kick count (10 movements in 2 hours).  · Your baby moves much less often than on the days before or you have not felt your baby move all day.  · Please review the MEDICATION LIST section of your AFTER VISIT SUMMARY document.  · Take your medication as prescribed    Urinary Tract Infection:  · Increase your fluid intake.  Avoid coffee, tea, liv, and other carbonated drinks.  · Please review the MEDICATION LIST section of your AFTER VISIT SUMMARY document.  · Take medications as prescribed.  · Take Medication until it is gone even if you feel better.  · Notify your physician if there is no improvement in your condition.      Other Instructions:  Drink at least a gallon of water a day. Finish RX.  Please carefully review your entire AFTER VISIT SUMMARY document for all discharge instructions.

## 2021-03-28 NOTE — PROGRESS NOTES
-pt is a , 33 weeks gestation IUP, previous c/s x2, with c/o cramping since morning, and DFM q3hrs. No c/o lof or bleeding. efm and toco placed, vss. SSE performed, FFN gathered.   - Asia DO called and given report, received orders for IV hydration x1, and SVE. Discussed poc with pt.   - sve performed, closed/posterior.   - Asia DO called and updated on negative FFN, sve, and continued irritability, c/o cramping. Received orders for another liter IV hydration and procardia. Discussed poc with pt.   - pt states cramping has decreased, and is feeling better. Asia DO updated.   0005- Asia DO at bedside discussing poc for discharge, and sending RX for macrobid for UTI  0025- addressed discharge instructions with PTL precautions, and UTI instructions, all questions answered, verbalized understanding. Left off floor stable with SO at side.

## 2021-03-28 NOTE — ED PROVIDER NOTES
ER Provider Note    PATIENT ID:  NAME:  Brenda Westbrook  MRN:               [id number]  YOB: 1995    CC:  Cramping today, also nausea but no vomiting.    HPI:  Brenda Westbrook is a 26 y.o. female  at 33w1d by a 18 week ultrasound No LMP recorded. Patient is pregnant.. Estimated Date of Delivery: 5/15/21  Patient presents complaining of cramping, without loss of fluid.  Reports good fetal movement.  Denies vaginal bleeding.  Pregnancy was complicated by the following problems.    ROS: Patient denies any fever chills, vomiting, headache, chest pain, shortness of breath, or dysuria or unusual swelling of hands or feet.     Prenatal Care: Obtained at [facility].    POB Hx:  OB History    Para Term  AB Living   3 2 2 0 0 2   SAB TAB Ectopic Molar Multiple Live Births   0 0 0     2      # Outcome Date GA Lbr Bhupinder/2nd Weight Sex Delivery Anes PTL Lv   3 Current            2 Term 09/15/18 40w1d  2.545 kg (5 lb 9.8 oz) M CS-LTranv   MARYSOL   1 Term 16 41w0d  3.09 kg (6 lb 13 oz) M CS-LTranv Spinal N MARYSOL      Birth Comments: double nuchal cord, NRFS      Complications: Fetal Intolerance       PMH/Problem List:    Past Medical History:   Diagnosis Date   • Asthma, exercise induced    • Bronchitis    • Chlamydia     Oct 2011   • Cold     02/6/15   • Endometriosis    • Head ache    • Indigestion    • Intractable migraine without aura and without status migrainosus 2017    Referral to neuro   • Other specified symptom associated with female genital organs     endometriosis   • Pain     pelvic   • Pain 12    stomach   • Psychiatric problem     depression   • Sleep disturbances    • Unspecified disorder of thyroid 2015    hyperactive in the past not taking any meds currently   • Urinary bladder disorder     frequent uti's     Patient Active Problem List    Diagnosis Date Noted   • High-risk pregnancy in third trimester 2021     Priority: High   • Hx of  section x2 2018      Priority: High   • S/P  section 2018   • Endometriosis determined by laparoscopy 2018   • Biological false positive RPR test 2018   • Intractable migraine without aura and without status migrainosus 2017   • Asthma, exercise induced        Current Outpatient Medications:  No current facility-administered medications on file prior to encounter.     Current Outpatient Medications on File Prior to Encounter   Medication Sig Dispense Refill   • Prenatal MV-Min-Fe Fum-FA-DHA (PRENATAL 1 PO) Take  by mouth.         PSH:    Past Surgical History:   Procedure Laterality Date   • REPEAT C SECTION N/A 9/15/2018    Procedure: REPEAT C SECTION;  Surgeon: Rachna Patterson M.D.;  Location: LABOR AND DELIVERY;  Service: Labor and Delivery   • PRIMARY C SECTION Bilateral 2016    Procedure: PRIMARY C SECTION;  Surgeon: Trever Beckman M.D.;  Location: LABOR AND DELIVERY;  Service:    • MANNY BY LAPAROSCOPY  3/12/2015    Procedure: MANNY BY LAPAROSCOPY;  Surgeon: Sean Mitchell M.D.;  Location: SURGERY [facility] ORS;  Service:    • PELVISCOPY     • CYSTOSCOPY  2012    Performed by QUINTIN LOPES at SURGERY [facility] ORS   • PELVISCOPY  2012    Performed by CHRISTIANO PUGH at SURGERY SAME DAY [facility] ORS   • MYRINGOTOMY     • STRABISMUS REPAIR      gerardo eyes   • TONSILLECTOMY         Allergies:   Allergies   Allergen Reactions   • Ciprofloxacin Vomiting   • Ciprofloxacin    • Codeine Hives   • Codeine    • Doxycycline Itching and Vomiting   • Doxycycline    • Keflex Itching   • Keflex        SH:  Social History     Socioeconomic History   • Marital status:      Spouse name: Not on file   • Number of children: Not on file   • Years of education: Not on file   • Highest education level: 12th grade   Occupational History   • Not on file   Tobacco Use   • Smoking status: Never Smoker   • Smokeless tobacco: Never Used   Substance and Sexual  "Activity   • Alcohol use: Yes   • Drug use: No   • Sexual activity: Yes     Partners: Male     Birth control/protection: Condom   Other Topics Concern   • Not on file   Social History Narrative    ** Merged History Encounter **          Social Determinants of Health     Financial Resource Strain: Low Risk    • Difficulty of Paying Living Expenses: Not very hard   Food Insecurity: No Food Insecurity   • Worried About Running Out of Food in the Last Year: Never true   • Ran Out of Food in the Last Year: Never true   Transportation Needs: No Transportation Needs   • Lack of Transportation (Medical): No   • Lack of Transportation (Non-Medical): No   Physical Activity: Inactive   • Days of Exercise per Week: 0 days   • Minutes of Exercise per Session: 0 min   Stress: No Stress Concern Present   • Feeling of Stress : Only a little   Social Connections: Somewhat Isolated   • Frequency of Communication with Friends and Family: Three times a week   • Frequency of Social Gatherings with Friends and Family: Once a week   • Attends Alevism Services: Never   • Active Member of Clubs or Organizations: No   • Attends Club or Organization Meetings: Never   • Marital Status:    Intimate Partner Violence:    • Fear of Current or Ex-Partner:    • Emotionally Abused:    • Physically Abused:    • Sexually Abused:          PHYSICAL EXAM:  Vitals:    21   BP: 126/74     Pulse: (!) 105  99   Resp: 18  20   Temp: 36.1 °C (97 °F)     TempSrc: Temporal     SpO2: 97% 97% 96%   Weight: 70.3 kg (155 lb)     Height: 1.6 m (5' 3\")       Temp (24hrs), Av.1 °C (97 °F), Min:36.1 °C (97 °F), Max:36.1 °C (97 °F)    General: No acute distress, resting comfortably in bed.  Abdomen: gravid, nontender, neg CVAT    FHTs: baseline 125, + accels, - decels, mod variability  [place]: 0 UCs in 10 min  SVE:  Cl/th/high per RN exam.  Ffn: neg  UA: +blood    A/P: Intrauterine pregancy at 33w1d weeks here for  " contractions.  UCs resolved with IV hydration and procardia.  1. Cat 1 FHTs  2. Reviewed return precautions.  3. PTL precautions given.  4. Continue FKCs.  5. Keep f/u at [facility].  6. UTI - rx for macrobid and zofran sent to pharmacy.

## 2021-03-30 ENCOUNTER — HOSPITAL ENCOUNTER (OUTPATIENT)
Facility: MEDICAL CENTER | Age: 26
End: 2021-03-30
Attending: OBSTETRICS & GYNECOLOGY
Payer: COMMERCIAL

## 2021-03-30 ENCOUNTER — ROUTINE PRENATAL (OUTPATIENT)
Dept: OBGYN | Facility: CLINIC | Age: 26
End: 2021-03-30
Payer: COMMERCIAL

## 2021-03-30 VITALS — DIASTOLIC BLOOD PRESSURE: 69 MMHG | SYSTOLIC BLOOD PRESSURE: 111 MMHG | BODY MASS INDEX: 28.06 KG/M2 | WEIGHT: 158.4 LBS

## 2021-03-30 DIAGNOSIS — R12 HEART BURN: ICD-10-CM

## 2021-03-30 DIAGNOSIS — O23.43 UTI (URINARY TRACT INFECTION) DURING PREGNANCY, THIRD TRIMESTER: ICD-10-CM

## 2021-03-30 LAB
APPEARANCE UR: NORMAL
BILIRUB UR STRIP-MCNC: NORMAL MG/DL
COLOR UR AUTO: YELLOW
GLUCOSE UR STRIP.AUTO-MCNC: NEGATIVE MG/DL
KETONES UR STRIP.AUTO-MCNC: NEGATIVE MG/DL
LEUKOCYTE ESTERASE UR QL STRIP.AUTO: NORMAL
NITRITE UR QL STRIP.AUTO: NEGATIVE
PH UR STRIP.AUTO: 7 [PH] (ref 5–8)
PROT UR QL STRIP: NORMAL MG/DL
RBC UR QL AUTO: NEGATIVE
SP GR UR STRIP.AUTO: 1.02
UROBILINOGEN UR STRIP-MCNC: NORMAL MG/DL

## 2021-03-30 PROCEDURE — 90040 PR PRENATAL FOLLOW UP: CPT | Performed by: OBSTETRICS & GYNECOLOGY

## 2021-03-30 PROCEDURE — 87086 URINE CULTURE/COLONY COUNT: CPT

## 2021-03-30 PROCEDURE — 81002 URINALYSIS NONAUTO W/O SCOPE: CPT | Performed by: OBSTETRICS & GYNECOLOGY

## 2021-03-30 RX ORDER — FAMOTIDINE 20 MG/1
20 TABLET, FILM COATED ORAL 2 TIMES DAILY
Qty: 60 TABLET | Refills: 6 | Status: SHIPPED | OUTPATIENT
Start: 2021-03-30 | End: 2021-10-01

## 2021-03-30 ASSESSMENT — FIBROSIS 4 INDEX: FIB4 SCORE: 0.76

## 2021-03-30 NOTE — PROGRESS NOTES
S: Pt presents for routine OB follow up. Good fetal movement.  No contractions, vaginal bleeding, or leakage of fluid.    Questions answered.    O: /69   Wt 71.8 kg (158 lb 6.4 oz)   BMI 28.06 kg/m²   Patients' weight gain, fluid intake and exercise level discussed.  Vitals, fundal height , fetal position, and FHR reviewed on flowsheet    Lab:  Recent Results (from the past 336 hour(s))   POC UA    Collection Time: 21  8:39 PM   Result Value Ref Range    POC Color Alison     POC Appearance Cloudy (A)     POC Glucose Negative Negative mg/dL    POC Ketones 40 (A) Negative mg/dL    POC Specific Gravity 1.025 1.005 - 1.030    POC Blood Trace-intact (A) Negative    POC Urine PH 6.0 5.0 - 8.0    POC Protein 30 (A) Negative mg/dL    POC Nitrites Negative Negative    POC Leukocyte Esterase Negative Negative   fFN if less than 34 wks gestation - must be prior to vaginal exam    Collection Time: 21  8:55 PM   Result Value Ref Range    Fetal Fibronectin Negative    POCT Urinalysis    Collection Time: 21  9:21 AM   Result Value Ref Range    POC Color YELLOW Negative    POC Appearance CLOUDY Negative    POC Leukocyte Esterase TRACE Negative    POC Nitrites NEGATIVE Negative    POC Urobiligen N/A Negative (0.2) mg/dL    POC Protein TRACE Negative mg/dL    POC Urine PH 7.0 5.0 - 8.0    POC Blood NEGATIVE Negative    POC Specific Gravity 1.020 <1.005 - >1.030    POC Ketones NEGATIVE Negative mg/dL    POC Bilirubin N/A Negative mg/dL    POC Glucose NEGATIVE Negative mg/dL       A/P:  26 y.o.  at 33w3d presents for routine obstetric follow-up.  Size equals dates and/or scan    1.  Continue prenatal vitamins.  2.  Fetal kick counts.  3.  Exercise at least 30 minutes daily.  4.  Drink at least 2L of water daily  5.  Labor precautions educated.  6.  Follow-up in 1 weeks.  7.  GBS at 36 weeks  8.  On Macrobid for recent UTI. Culture not sent, will send today.     HX  x2. Desires repeat and  tubal    C/S for 5/9/2021    HX iud perf with laparoscopy retrieval br Dr. Chery     Declined genetic screening    HX migraine on Fioricet-sees neuro  Desires sterilization : submitted repeat for 5/9 or 5/10

## 2021-04-01 LAB
BACTERIA UR CULT: NORMAL
SIGNIFICANT IND 70042: NORMAL
SITE SITE: NORMAL
SOURCE SOURCE: NORMAL

## 2021-04-06 ENCOUNTER — ROUTINE PRENATAL (OUTPATIENT)
Dept: OBGYN | Facility: CLINIC | Age: 26
End: 2021-04-06
Payer: COMMERCIAL

## 2021-04-06 VITALS — SYSTOLIC BLOOD PRESSURE: 116 MMHG | DIASTOLIC BLOOD PRESSURE: 70 MMHG | WEIGHT: 157 LBS | BODY MASS INDEX: 27.81 KG/M2

## 2021-04-06 DIAGNOSIS — Z98.891 HX OF CESAREAN SECTION: ICD-10-CM

## 2021-04-06 DIAGNOSIS — J45.990 ASTHMA, EXERCISE INDUCED: ICD-10-CM

## 2021-04-06 DIAGNOSIS — O09.93 HIGH-RISK PREGNANCY IN THIRD TRIMESTER: ICD-10-CM

## 2021-04-06 DIAGNOSIS — N80.9 ENDOMETRIOSIS DETERMINED BY LAPAROSCOPY: ICD-10-CM

## 2021-04-06 DIAGNOSIS — O36.8123: ICD-10-CM

## 2021-04-06 LAB
NST ACOUSTIC STIMULATION: NO
NST ACTION NECESSARY: NORMAL
NST ASSESSMENT: NORMAL
NST BASELINE: NORMAL
NST INDICATIONS: NORMAL
NST OTHER DATA: NORMAL
NST READ BY: NORMAL
NST RETURN: NORMAL
NST UTERINE ACTIVITY: NO

## 2021-04-06 PROCEDURE — 90040 PR PRENATAL FOLLOW UP: CPT | Mod: 25 | Performed by: OBSTETRICS & GYNECOLOGY

## 2021-04-06 PROCEDURE — 59025 FETAL NON-STRESS TEST: CPT | Performed by: OBSTETRICS & GYNECOLOGY

## 2021-04-06 ASSESSMENT — FIBROSIS 4 INDEX: FIB4 SCORE: 0.76

## 2021-04-06 NOTE — PROGRESS NOTES
S: Pt presents for routine OB follow up.  Patient is doing well currently.  Reports that she feels fetal movement all day but she does not believe she is getting the normal 10 kick counts.  Denies headaches or scotoma.  Reports normal bowel and bladder functions.  Has no asthma symptoms.  No unusual pain.  Has had 2 prior  and is scheduled for repeat  with sterilization.  No contractions, vaginal bleeding, or leakage of fluid.  Reports some random mild pelvic cramping  Questions answered.    O: /70   Wt 71.2 kg (157 lb)   BMI 27.81 kg/m²   Patients' weight gain, fluid intake and exercise level discussed.  Vitals, fundal height , fetal position, and FHR reviewed on flowsheet    Lab:  Recent Results (from the past 336 hour(s))   POC UA    Collection Time: 21  8:39 PM   Result Value Ref Range    POC Color Alison     POC Appearance Cloudy (A)     POC Glucose Negative Negative mg/dL    POC Ketones 40 (A) Negative mg/dL    POC Specific Gravity 1.025 1.005 - 1.030    POC Blood Trace-intact (A) Negative    POC Urine PH 6.0 5.0 - 8.0    POC Protein 30 (A) Negative mg/dL    POC Nitrites Negative Negative    POC Leukocyte Esterase Negative Negative   fFN if less than 34 wks gestation - must be prior to vaginal exam    Collection Time: 21  8:55 PM   Result Value Ref Range    Fetal Fibronectin Negative    POCT Urinalysis    Collection Time: 21  9:21 AM   Result Value Ref Range    POC Color YELLOW Negative    POC Appearance CLOUDY Negative    POC Leukocyte Esterase TRACE Negative    POC Nitrites NEGATIVE Negative    POC Urobiligen N/A Negative (0.2) mg/dL    POC Protein TRACE Negative mg/dL    POC Urine PH 7.0 5.0 - 8.0    POC Blood NEGATIVE Negative    POC Specific Gravity 1.020 <1.005 - >1.030    POC Ketones NEGATIVE Negative mg/dL    POC Bilirubin N/A Negative mg/dL    POC Glucose NEGATIVE Negative mg/dL   URINE CULTURE(NEW)    Collection Time: 21  9:21 AM    Specimen: Urine    Result Value Ref Range    Significant Indicator NEG     Source UR     Site -     Culture Result Usual skin joe <10,000 cfu/mL        A/P:  26 y.o.  at 34w3d presents for routine obstetric follow-up.  Doing well except for decreased fetal movements.  NST was reactive and patient marked normal movements while on monitor  Size equals dates   NST ordered today-NST is reactive  Encounter Diagnoses   Name Primary?   • High-risk pregnancy in third trimester    • Decreased fetal movements in second trimester, fetus 3    • Hx of  section x2    • Endometriosis determined by laparoscopy    • Asthma, exercise induced          1.  Continue prenatal vitamins.  2.  Fetal kick counts daily discussed.  3.  Exercise at least 30 minutes daily.  4.  Drink at least 2L of water daily  5.  Pregnancy and  labor precautions educated.  6.  Follow-up in 1 week  7.  GBS culture planned at 36 weeks

## 2021-04-06 NOTE — PROGRESS NOTES
Pt here today for OB follow up  Pt states no VB or LOF. Pt states is not getting 10 kicks within 2 hours and is still experiencing cramping   Reports +FM  Good # [phone]    Pharmacy Confirmed.  Chaperone offered and provided.

## 2021-04-13 ENCOUNTER — HOSPITAL ENCOUNTER (EMERGENCY)
Facility: MEDICAL CENTER | Age: 26
End: 2021-04-13
Attending: OBSTETRICS & GYNECOLOGY | Admitting: OBSTETRICS & GYNECOLOGY
Payer: COMMERCIAL

## 2021-04-13 ENCOUNTER — APPOINTMENT (OUTPATIENT)
Dept: RADIOLOGY | Facility: MEDICAL CENTER | Age: 26
End: 2021-04-13
Attending: OBSTETRICS & GYNECOLOGY
Payer: COMMERCIAL

## 2021-04-13 ENCOUNTER — ROUTINE PRENATAL (OUTPATIENT)
Dept: OBGYN | Facility: CLINIC | Age: 26
End: 2021-04-13
Payer: COMMERCIAL

## 2021-04-13 VITALS
TEMPERATURE: 97.2 F | DIASTOLIC BLOOD PRESSURE: 68 MMHG | WEIGHT: 160 LBS | OXYGEN SATURATION: 96 % | BODY MASS INDEX: 28.34 KG/M2 | HEART RATE: 63 BPM | RESPIRATION RATE: 16 BRPM | SYSTOLIC BLOOD PRESSURE: 117 MMHG

## 2021-04-13 VITALS — DIASTOLIC BLOOD PRESSURE: 67 MMHG | SYSTOLIC BLOOD PRESSURE: 123 MMHG | BODY MASS INDEX: 28.34 KG/M2 | WEIGHT: 160 LBS

## 2021-04-13 DIAGNOSIS — G43.019 INTRACTABLE MIGRAINE WITHOUT AURA AND WITHOUT STATUS MIGRAINOSUS: ICD-10-CM

## 2021-04-13 DIAGNOSIS — O09.93 HIGH-RISK PREGNANCY IN THIRD TRIMESTER: Primary | ICD-10-CM

## 2021-04-13 PROCEDURE — 93971 EXTREMITY STUDY: CPT | Mod: 26,LT | Performed by: INTERNAL MEDICINE

## 2021-04-13 PROCEDURE — 96375 TX/PRO/DX INJ NEW DRUG ADDON: CPT

## 2021-04-13 PROCEDURE — 700111 HCHG RX REV CODE 636 W/ 250 OVERRIDE (IP): Performed by: OBSTETRICS & GYNECOLOGY

## 2021-04-13 PROCEDURE — 99283 EMERGENCY DEPT VISIT LOW MDM: CPT

## 2021-04-13 PROCEDURE — 93971 EXTREMITY STUDY: CPT | Mod: LT

## 2021-04-13 PROCEDURE — 59025 FETAL NON-STRESS TEST: CPT | Mod: 26 | Performed by: PHYSICIAN ASSISTANT

## 2021-04-13 PROCEDURE — 96365 THER/PROPH/DIAG IV INF INIT: CPT

## 2021-04-13 PROCEDURE — 700111 HCHG RX REV CODE 636 W/ 250 OVERRIDE (IP)

## 2021-04-13 PROCEDURE — 76819 FETAL BIOPHYS PROFIL W/O NST: CPT

## 2021-04-13 PROCEDURE — 59025 FETAL NON-STRESS TEST: CPT

## 2021-04-13 PROCEDURE — 96366 THER/PROPH/DIAG IV INF ADDON: CPT

## 2021-04-13 PROCEDURE — 90040 PR PRENATAL FOLLOW UP: CPT | Performed by: NURSE PRACTITIONER

## 2021-04-13 RX ORDER — DIPHENHYDRAMINE HYDROCHLORIDE 50 MG/ML
25 INJECTION INTRAMUSCULAR; INTRAVENOUS ONCE
Status: COMPLETED | OUTPATIENT
Start: 2021-04-13 | End: 2021-04-13

## 2021-04-13 RX ORDER — DIPHENHYDRAMINE HCL 25 MG
25 TABLET ORAL ONCE
Status: DISCONTINUED | OUTPATIENT
Start: 2021-04-13 | End: 2021-04-13 | Stop reason: HOSPADM

## 2021-04-13 RX ORDER — DIPHENHYDRAMINE HYDROCHLORIDE 50 MG/ML
INJECTION INTRAMUSCULAR; INTRAVENOUS
Status: COMPLETED
Start: 2021-04-13 | End: 2021-04-13

## 2021-04-13 RX ORDER — METOCLOPRAMIDE HYDROCHLORIDE 5 MG/ML
5 INJECTION INTRAMUSCULAR; INTRAVENOUS ONCE
Status: COMPLETED | OUTPATIENT
Start: 2021-04-13 | End: 2021-04-13

## 2021-04-13 RX ORDER — MAGNESIUM SULFATE HEPTAHYDRATE 40 MG/ML
2 INJECTION, SOLUTION INTRAVENOUS ONCE
Status: COMPLETED | OUTPATIENT
Start: 2021-04-13 | End: 2021-04-13

## 2021-04-13 RX ADMIN — DIPHENHYDRAMINE HYDROCHLORIDE 25 MG: 50 INJECTION INTRAMUSCULAR; INTRAVENOUS at 17:11

## 2021-04-13 RX ADMIN — METOCLOPRAMIDE 5 MG: 5 INJECTION, SOLUTION INTRAMUSCULAR; INTRAVENOUS at 17:13

## 2021-04-13 RX ADMIN — MAGNESIUM SULFATE 2 G: 2 INJECTION INTRAVENOUS at 17:13

## 2021-04-13 ASSESSMENT — FIBROSIS 4 INDEX
FIB4 SCORE: 0.76
FIB4 SCORE: 0.76

## 2021-04-13 NOTE — PROGRESS NOTES
S) Pt is a 26 y.o.   at 35w3d  gestation. Routine prenatal care today. She complains of a migraine that will not go away. She takes medication at home (propanolol and butalbital), but nothing is helping. She states her upper abdomen hurts, but it has been going on for a while. Denies visual changes. States her left leg is swollen and she has pain in the back of her calf. BP is normal, and homans is negative. Has C/S scheduled with BTL. Labor precautions reviewed, GBS next visit. All questions answered.    Fetal movement Normal  Cramping no  VB no  LOF no   Denies dysuria. Generally feels well today. Good self-care activities identified. Denies headaches, swelling, visual changes, or epigastric pain .     O) /67   Wt 72.6 kg (160 lb)         Labs:       PNL: WNL       GCT: 128        AFP: Not Examined       GBS: N/A       Pertinent ultrasound -        2021- Survey WNL, NATALIA 17.58cm, c/w prev dating.    A) IUP at 35w3d       S=D    Bilateral lower extremities with minimal swelling, appear equal, but patient feels L>R. Negative Homans.    Discussed need to have further evaluation, will go to L&D now         Patient Active Problem List    Diagnosis Date Noted   • High-risk pregnancy in third trimester 2021   • Hx of  section x2 2018   • Urinary tract infection in mother during third trimester of pregnancy 2021   • S/P  section 2018   • Endometriosis determined by laparoscopy 2018   • Biological false positive RPR test 2018   • Intractable migraine without aura and without status migrainosus 2017   • Asthma, exercise induced           SVE: deferred       Chaperone offered: n/a         TDAP: yes       FLU: yes        BTL: yes       : no       C/S Consent: no       IOL or C/S scheduled: yes - 2021       LAST PAP: 2021- negative         P) s/s ptl vs general discomforts. Fetal movements reviewed. General ed and anticipatory guidance.  Nutrition/exercise/vitamin. Plans breast Plans pp contraception- BTL with c/s.  Continue PNV.

## 2021-04-13 NOTE — PROGRESS NOTES
Pt. Here for OB/FU. Reports Good FM.   Good # [phone]  Pt. Denies VB, LOF, or UC's.   Pharmacy verified.   Chaperone offered and not indicated  Pt states having a headache since Saturday night that will not go away. Pt also she is having some numbness on her feet and she is having some swelling on her left leg

## 2021-04-14 NOTE — ED PROVIDER NOTES
Emergency Obstetric Consultation     Date of Service  2021    Reason for Consultation  LE edema with migraine    History of Presenting Illness  26 y.o. female who presented 2021 with migraine, LE edema    Review of Systems  ROS    Obstetric History    OB History    Para Term  AB Living   3 2 2 0 0 2   SAB TAB Ectopic Molar Multiple Live Births   0 0 0     2      # Outcome Date GA Lbr Bhupinder/2nd Weight Sex Delivery Anes PTL Lv   3 Current            2 Term 09/15/18 40w1d  2.545 kg (5 lb 9.8 oz) M CS-LTranv   MARYSOL   1 Term 16 41w0d  3.09 kg (6 lb 13 oz) M CS-LTranv Spinal N MARYSOL      Birth Comments: double nuchal cord, NRFS      Complications: Fetal Intolerance       Medical History   has a past medical history of Asthma, exercise induced, Bronchitis, Chlamydia, Cold, Endometriosis, Head ache, Indigestion, Intractable migraine without aura and without status migrainosus (2017), Other specified symptom associated with female genital organs, Pain, Pain (12), Psychiatric problem, Sleep disturbances, Unspecified disorder of thyroid (), and Urinary bladder disorder.    Surgical History   has a past surgical history that includes tonsillectomy; strabismus repair; myringotomy; cystoscopy (2012); pelviscopy (2012); pelviscopy (); joshua by laparoscopy (3/12/2015); primary c section (Bilateral, 2016); and repeat c section (N/A, 9/15/2018).    Family History  family history includes Cancer in an other family member; Diabetes in an other family member; Heart Disease in an other family member; Hypertension in an other family member; Other in her brother and mother; Stroke in an other family member.    Social History   reports that she has never smoked. She has never used smokeless tobacco. She reports current alcohol use. She reports that she does not use drugs.    Medications  Prior to Admission Medications   Prescriptions Last Dose Informant Patient Reported?  Taking?   Misc. Devices (BREAST PUMP) Misc   No No   Sig: Breast pump with supplies   Patient not taking: Reported on 3/30/2021   Prenatal MV-Min-Fe Fum-FA-DHA (PRENATAL 1 PO)   Yes No   Sig: Take  by mouth.   famotidine (PEPCID) 20 MG Tab   No No   Sig: Take 1 tablet by mouth 2 times a day.   ondansetron (ZOFRAN) 4 MG Tab tablet   No No   Sig: Take 1 tablet by mouth every four hours as needed for Nausea/Vomiting.   propranolol CR (INDERAL LA) 120 MG CAPSULE SR 24 HR   No No   Sig: Take 1 Cap by mouth every day.      Facility-Administered Medications: None       Allergies  Allergies   Allergen Reactions   • Ciprofloxacin Vomiting   • Codeine Hives   • Doxycycline Itching and Vomiting   • Keflex Itching       Physical Exam  Vitals:    21 1827 21 1828 21 1831 21 1833   BP:       Pulse: 69 73 75 74   Resp:       Temp:       TempSrc:       SpO2:  99%  96%   Weight:           Physical Exam    Laboratory               No results for input(s): NTPROBNP in the last 72 hours.         Imaging  US-BIOPHYSICAL PROFILE   Final Result         1.  Normal biophysical profile ultrasound.      US-EXTREMITY VENOUS LOWER UNILAT LEFT   Final Result          Assessment  No new Assessment & Plan notes have been filed under this hospital service since the last note was generated.  Service: Obstetrics & Gynecology    Brenda Westbrook 26 y.o. year old  35w3d with migraine.     Plan  1. Migraine improved, pt not in labor, NST Cat 1 with 8/10 BPP. Pt to f/u 1 wk for OB appt. Labor precautions reviewed.     BRENDON Guadalupe.

## 2021-04-14 NOTE — PROGRESS NOTES
1900-Report from MAC Martinez RN. POC discussed. US tech at bedside  1930-Per  tech, BPP 6/8 1932-Updated Dr. Asencio in department on US. Orders to monitor for 10 minutes then discharge home  1955-Phoned Dr. Asencio, pt reports feeling UC's, orders for SVE  2003-SVE cl/thick/high  2010-Updated Dr. Asencio in department on SVE, discharge orders received  2022-Pt discharged home, ambulatory and undelivered. Provided general instructions, PTL precautions and kick count instructions, understanding verbalized

## 2021-04-14 NOTE — PROGRESS NOTES
PT presenting with CO HA and left leg pain.    1525 Report to Luis M, orders for Meds per MAR.  HA pain 7/10    1600  IV placed.    Clarified magnesium order with martell, per MD bolus magnesium.    1721 RN continues to be at BS, audible decel heard, PT moved from side to side. Martell called and no additional orders at this time.     1711 RN at BS for meds per MAR.     1850 Per PT HA 2/10    1900 Report to Braeden

## 2021-04-20 ENCOUNTER — ROUTINE PRENATAL (OUTPATIENT)
Dept: OBGYN | Facility: CLINIC | Age: 26
End: 2021-04-20
Payer: COMMERCIAL

## 2021-04-20 ENCOUNTER — HOSPITAL ENCOUNTER (OUTPATIENT)
Facility: MEDICAL CENTER | Age: 26
End: 2021-04-20
Attending: OBSTETRICS & GYNECOLOGY
Payer: COMMERCIAL

## 2021-04-20 VITALS — BODY MASS INDEX: 28.18 KG/M2 | DIASTOLIC BLOOD PRESSURE: 64 MMHG | SYSTOLIC BLOOD PRESSURE: 114 MMHG | WEIGHT: 159.1 LBS

## 2021-04-20 DIAGNOSIS — O09.299 IUGR (INTRAUTERINE GROWTH RESTRICTION) IN PRIOR PREGNANCY, PREGNANT, UNSPECIFIED TRIMESTER: ICD-10-CM

## 2021-04-20 DIAGNOSIS — Z34.93 THIRD TRIMESTER PREGNANCY: ICD-10-CM

## 2021-04-20 DIAGNOSIS — O36.5930 POOR FETAL GROWTH AFFECTING MANAGEMENT OF MOTHER IN THIRD TRIMESTER, SINGLE OR UNSPECIFIED FETUS: ICD-10-CM

## 2021-04-20 PROCEDURE — 87150 DNA/RNA AMPLIFIED PROBE: CPT

## 2021-04-20 PROCEDURE — 87081 CULTURE SCREEN ONLY: CPT

## 2021-04-20 PROCEDURE — 90040 PR PRENATAL FOLLOW UP: CPT | Performed by: OBSTETRICS & GYNECOLOGY

## 2021-04-20 ASSESSMENT — FIBROSIS 4 INDEX: FIB4 SCORE: 0.76

## 2021-04-20 NOTE — PROGRESS NOTES
S: Pt presents for routine OB follow up. Good fetal movement.  No contractions, vaginal bleeding, or leakage of fluid.    Questions answered.    O: /64   Wt 72.2 kg (159 lb 1.6 oz)   BMI 28.18 kg/m²   Patients' weight gain, fluid intake and exercise level discussed.  Vitals, fundal height , fetal position, and FHR reviewed on flowsheet    Lab:  Recent Results (from the past 336 hour(s))   POCT Fetal Nonstress Test    Collection Time: 21  9:40 AM   Result Value Ref Range    NST Indications Decreased fetal movements     NST Baseline 130s     NST Uterine Activity No     NST Acoustic Stimulation No     NST Assessment       Reactive NST without decelerations.  Patient marked normal movements while on monitor    NST Action Necessary      NST Other Data      NST Return      NST Read By JEANCARLOS CASTLE [[id number]]        A/P:  26 y.o.  at 36w3d presents for routine obstetric follow-up.  Size equals dates and/or scan    1.  Continue prenatal vitamins.  2.  Fetal kick counts.  3.  Exercise at least 30 minutes daily.  4.  Drink at least 2L of water daily  5.  Labor precautions educated.  6.  Follow-up in 1 weeks.  7.  GBS today    PNP WNL- RI A+  Anatomy WNL  1 hr GTT WNL    HX  x2. Desires repeat and tubal    C/S for 2021    HX iud perf with laparoscopy retrieval br Dr. Chery     Declined genetic screening    HX migraine on Fioricet-sees neuro  Desires sterilization : submitted repeat for  or 5/10

## 2021-04-20 NOTE — PROGRESS NOTES
Pt is here for OB follow-up  No VB, UC or LOF.  Good phone #:[phone]  Pharmacy verified.  Pt states having pretty consistent heavy cramping since Sunday.  Pt states no other complaints for today.  GBS today.

## 2021-04-21 ENCOUNTER — HOSPITAL ENCOUNTER (OUTPATIENT)
Dept: RADIOLOGY | Facility: MEDICAL CENTER | Age: 26
End: 2021-04-21
Attending: OBSTETRICS & GYNECOLOGY
Payer: COMMERCIAL

## 2021-04-21 DIAGNOSIS — O36.5930 POOR FETAL GROWTH AFFECTING MANAGEMENT OF MOTHER IN THIRD TRIMESTER, SINGLE OR UNSPECIFIED FETUS: ICD-10-CM

## 2021-04-21 DIAGNOSIS — O09.299 IUGR (INTRAUTERINE GROWTH RESTRICTION) IN PRIOR PREGNANCY, PREGNANT, UNSPECIFIED TRIMESTER: ICD-10-CM

## 2021-04-21 LAB — GP B STREP DNA SPEC QL NAA+PROBE: NEGATIVE

## 2021-04-21 PROCEDURE — 76816 OB US FOLLOW-UP PER FETUS: CPT

## 2021-04-27 ENCOUNTER — ROUTINE PRENATAL (OUTPATIENT)
Dept: OBGYN | Facility: CLINIC | Age: 26
End: 2021-04-27
Payer: COMMERCIAL

## 2021-04-27 VITALS — BODY MASS INDEX: 27.28 KG/M2 | WEIGHT: 154 LBS | DIASTOLIC BLOOD PRESSURE: 83 MMHG | SYSTOLIC BLOOD PRESSURE: 125 MMHG

## 2021-04-27 DIAGNOSIS — Z98.891 HX OF CESAREAN SECTION: ICD-10-CM

## 2021-04-27 PROCEDURE — 90040 PR PRENATAL FOLLOW UP: CPT | Performed by: OBSTETRICS & GYNECOLOGY

## 2021-04-27 ASSESSMENT — FIBROSIS 4 INDEX: FIB4 SCORE: 0.76

## 2021-04-27 NOTE — PROGRESS NOTES
OB Followup;    37w3d    Patient Active Problem List    Diagnosis Date Noted   • High-risk pregnancy in third trimester 2021     Priority: High   • Hx of  section x2 2018     Priority: High   • Urinary tract infection in mother during third trimester of pregnancy 2021   • S/P  section 2018   • Endometriosis determined by laparoscopy 2018   • Biological false positive RPR test 2018   • Intractable migraine without aura and without status migrainosus 2017   • Asthma, exercise induced        Vitals:    21 0819   BP: 125/83   Weight: 69.9 kg (154 lb)       Patient presents for followup of OB care. Currently doing well . Good fetal movement no leakage of fluid no contractions or vaginal bleeding        Size equals dates, normal fetal heart rate      Labs; GBS negative    Patient is preop for repeat  section with bilateral salpingectomy  Discussed the procedure in detail including permanent nature of bilateral salpingectomy performed at the time of  section is meant to be permanent and cannot be undone.  We discussed the risks of pain bleeding infection damage to any or all internal organs including but limited to bowel intestines bladder uterus tubes ovaries nerves blood vessels skin and baby possible blood transfusion with inherent risk of AIDS hepatitis.  Patient has a small remote chance of pregnancy after bilateral salpingectomy and she may become pregnant.    Labor precautions given  Discussed proper weight gain during pregnancy.    Signs and symptoms of labor/ labor discussed   Discussed our group's policy on prenatal checkups and delivery  Discussed Covid precautions  Discussed Covid vaccination recommendations from CDC/ACOG  Discussed proper exercise during pregnancy  Discussed proper oral fluid hydration  Currently taking prenatal vitamins as instructed  Reviewed fetal kick counts and appropriate fetal movement during  pregnancy  Reviewed postpartum birth control methods  All questions answered in detail    Followup in  1 weeks

## 2021-04-27 NOTE — PROGRESS NOTES
Pt here today for OB follow up and Pre Op  Pt states no VB or LOF   Reports +  Good # [phone]   Pharmacy Confirmed.  Chaperone offered and provided.   Pt aware or C/S time and date

## 2021-04-28 ENCOUNTER — PRE-ADMISSION TESTING (OUTPATIENT)
Dept: ADMISSIONS | Facility: MEDICAL CENTER | Age: 26
End: 2021-04-28
Attending: OBSTETRICS & GYNECOLOGY
Payer: COMMERCIAL

## 2021-04-28 RX ORDER — BUTALBITAL, ACETAMINOPHEN AND CAFFEINE 50; 325; 40 MG/1; MG/1; MG/1
1 TABLET ORAL EVERY 4 HOURS PRN
Status: ON HOLD | COMMUNITY
End: 2021-05-13

## 2021-05-04 ENCOUNTER — ROUTINE PRENATAL (OUTPATIENT)
Dept: OBGYN | Facility: CLINIC | Age: 26
End: 2021-05-04
Payer: COMMERCIAL

## 2021-05-04 VITALS — DIASTOLIC BLOOD PRESSURE: 75 MMHG | SYSTOLIC BLOOD PRESSURE: 125 MMHG | BODY MASS INDEX: 27.6 KG/M2 | WEIGHT: 155.8 LBS

## 2021-05-04 DIAGNOSIS — L29.9 PRURITUS OF PREGNANCY IN THIRD TRIMESTER: ICD-10-CM

## 2021-05-04 DIAGNOSIS — O99.713 PRURITUS OF PREGNANCY IN THIRD TRIMESTER: ICD-10-CM

## 2021-05-04 PROCEDURE — 90040 PR PRENATAL FOLLOW UP: CPT | Performed by: OBSTETRICS & GYNECOLOGY

## 2021-05-04 ASSESSMENT — FIBROSIS 4 INDEX: FIB4 SCORE: 0.76

## 2021-05-04 NOTE — PROGRESS NOTES
Pt is here for OB follow-up  No VB, UC's or LOF.  Good phone #:[phone]  Pharmacy verified.  Pt states started having heavy ctx for on and off yesterday @ 4 pm.  Pt states no other complaints for today.

## 2021-05-04 NOTE — PROGRESS NOTES
S: Pt presents for routine OB follow up. Patient has concerns over fetal movement. She will feel that the baby feels like it is shaking and then will go a long period of time without moving.    No vaginal bleeding, or leakage of fluid.  Few contractions but not regular.   Questions answered.    O: /75   Wt 70.7 kg (155 lb 12.8 oz)   BMI 27.60 kg/m²   Patients' weight gain, fluid intake and exercise level discussed.  Vitals, fundal height , fetal position, and FHR reviewed on flowsheet    Lab:No results found for this or any previous visit (from the past 336 hour(s)).    A/P:  26 y.o.  at 38w3d presents for routine obstetric follow-up.  Size equals dates and/or scan    1.  Continue prenatal vitamins.  2.  Fetal kick counts.  3.  Exercise at least 30 minutes daily.  4.  Drink at least 2L of water daily  5.  Labor precautions educated.  6.  Follow-up in 1 weeks.  7.  GBS negative    PNP WNL- RI A+  Anatomy WNL  1 hr GTT WNL    HX  x2. Desires repeat and tubal    C/S with BS for 2021    HX iud perf with laparoscopy retrieval br Dr. Chery     Repeat  section with bilateral salpingectomy-preoperative counseling performed    Declined genetic screening    HX migraine on Fioricet-sees neuro  Itching on belly and palm and soles of feet at 38 weeks- bile acids and cmp ordered     Today I discussed movement patterns with patient. Discussed that shaking movement has not been associated with any adverse outcome after delivery that we know of. However, if the baby is not moving I have asked that she sit down to pay attention. If she goes 1 hour with no movement she is to go to labor and delivery. If she goes 2 hours with less than 10 movements, she is to go to labor and delivery. She expressed understanding.

## 2021-05-05 ENCOUNTER — HOSPITAL ENCOUNTER (OUTPATIENT)
Dept: LAB | Facility: MEDICAL CENTER | Age: 26
End: 2021-05-05
Attending: OBSTETRICS & GYNECOLOGY
Payer: COMMERCIAL

## 2021-05-05 DIAGNOSIS — O99.713 PRURITUS OF PREGNANCY IN THIRD TRIMESTER: ICD-10-CM

## 2021-05-05 DIAGNOSIS — L29.9 PRURITUS OF PREGNANCY IN THIRD TRIMESTER: ICD-10-CM

## 2021-05-05 LAB
ALBUMIN SERPL BCP-MCNC: 3.2 G/DL (ref 3.2–4.9)
ALBUMIN/GLOB SERPL: 1.2 G/DL
ALP SERPL-CCNC: 104 U/L (ref 30–99)
ALT SERPL-CCNC: 7 U/L (ref 2–50)
ANION GAP SERPL CALC-SCNC: 11 MMOL/L (ref 7–16)
AST SERPL-CCNC: 8 U/L (ref 12–45)
BILIRUB SERPL-MCNC: 0.2 MG/DL (ref 0.1–1.5)
BUN SERPL-MCNC: 5 MG/DL (ref 8–22)
CALCIUM SERPL-MCNC: 8.8 MG/DL (ref 8.5–10.5)
CHLORIDE SERPL-SCNC: 107 MMOL/L (ref 96–112)
CO2 SERPL-SCNC: 21 MMOL/L (ref 20–33)
CREAT SERPL-MCNC: 0.55 MG/DL (ref 0.5–1.4)
GLOBULIN SER CALC-MCNC: 2.7 G/DL (ref 1.9–3.5)
GLUCOSE SERPL-MCNC: 80 MG/DL (ref 65–99)
POTASSIUM SERPL-SCNC: 4.1 MMOL/L (ref 3.6–5.5)
PROT SERPL-MCNC: 5.9 G/DL (ref 6–8.2)
SODIUM SERPL-SCNC: 139 MMOL/L (ref 135–145)

## 2021-05-05 PROCEDURE — 36415 COLL VENOUS BLD VENIPUNCTURE: CPT

## 2021-05-05 PROCEDURE — 80053 COMPREHEN METABOLIC PANEL: CPT

## 2021-05-05 PROCEDURE — 82239 BILE ACIDS TOTAL: CPT

## 2021-05-06 ENCOUNTER — HOSPITAL ENCOUNTER (OUTPATIENT)
Dept: OBGYN | Facility: MEDICAL CENTER | Age: 26
End: 2021-05-06
Attending: OBSTETRICS & GYNECOLOGY
Payer: COMMERCIAL

## 2021-05-06 LAB
BILE AC SERPL-SCNC: 3 UMOL/L (ref 0–10)
SARS-COV-2 RNA RESP QL NAA+PROBE: NOTDETECTED
SPECIMEN SOURCE: NORMAL

## 2021-05-06 PROCEDURE — U0003 INFECTIOUS AGENT DETECTION BY NUCLEIC ACID (DNA OR RNA); SEVERE ACUTE RESPIRATORY SYNDROME CORONAVIRUS 2 (SARS-COV-2) (CORONAVIRUS DISEASE [COVID-19]), AMPLIFIED PROBE TECHNIQUE, MAKING USE OF HIGH THROUGHPUT TECHNOLOGIES AS DESCRIBED BY CMS-2020-01-R: HCPCS

## 2021-05-06 PROCEDURE — C9803 HOPD COVID-19 SPEC COLLECT: HCPCS | Performed by: OBSTETRICS & GYNECOLOGY

## 2021-05-06 PROCEDURE — U0005 INFEC AGEN DETEC AMPLI PROBE: HCPCS

## 2021-05-09 ENCOUNTER — ANESTHESIA (OUTPATIENT)
Dept: OBGYN | Facility: MEDICAL CENTER | Age: 26
End: 2021-05-09
Payer: COMMERCIAL

## 2021-05-09 ENCOUNTER — HOSPITAL ENCOUNTER (INPATIENT)
Facility: MEDICAL CENTER | Age: 26
LOS: 4 days | End: 2021-05-13
Attending: OBSTETRICS & GYNECOLOGY | Admitting: OBSTETRICS & GYNECOLOGY
Payer: COMMERCIAL

## 2021-05-09 ENCOUNTER — ANESTHESIA EVENT (OUTPATIENT)
Dept: OBGYN | Facility: MEDICAL CENTER | Age: 26
End: 2021-05-09
Payer: COMMERCIAL

## 2021-05-09 DIAGNOSIS — Z98.891 S/P CESAREAN SECTION: ICD-10-CM

## 2021-05-09 LAB
BASOPHILS # BLD AUTO: 0.4 % (ref 0–1.8)
BASOPHILS # BLD: 0.03 K/UL (ref 0–0.12)
EOSINOPHIL # BLD AUTO: 0.04 K/UL (ref 0–0.51)
EOSINOPHIL NFR BLD: 0.6 % (ref 0–6.9)
ERYTHROCYTE [DISTWIDTH] IN BLOOD BY AUTOMATED COUNT: 46.6 FL (ref 35.9–50)
HCT VFR BLD AUTO: 32.7 % (ref 37–47)
HGB BLD-MCNC: 11.1 G/DL (ref 12–16)
HOLDING TUBE BB 8507: NORMAL
IMM GRANULOCYTES # BLD AUTO: 0.04 K/UL (ref 0–0.11)
IMM GRANULOCYTES NFR BLD AUTO: 0.6 % (ref 0–0.9)
LYMPHOCYTES # BLD AUTO: 1.49 K/UL (ref 1–4.8)
LYMPHOCYTES NFR BLD: 21.5 % (ref 22–41)
MCH RBC QN AUTO: 31.6 PG (ref 27–33)
MCHC RBC AUTO-ENTMCNC: 33.9 G/DL (ref 33.6–35)
MCV RBC AUTO: 93.2 FL (ref 81.4–97.8)
MONOCYTES # BLD AUTO: 0.59 K/UL (ref 0–0.85)
MONOCYTES NFR BLD AUTO: 8.5 % (ref 0–13.4)
NEUTROPHILS # BLD AUTO: 4.73 K/UL (ref 2–7.15)
NEUTROPHILS NFR BLD: 68.4 % (ref 44–72)
NRBC # BLD AUTO: 0 K/UL
NRBC BLD-RTO: 0 /100 WBC
PLATELET # BLD AUTO: 171 K/UL (ref 164–446)
PMV BLD AUTO: 10.1 FL (ref 9–12.9)
RBC # BLD AUTO: 3.51 M/UL (ref 4.2–5.4)
WBC # BLD AUTO: 6.9 K/UL (ref 4.8–10.8)

## 2021-05-09 PROCEDURE — 700111 HCHG RX REV CODE 636 W/ 250 OVERRIDE (IP)

## 2021-05-09 PROCEDURE — 36415 COLL VENOUS BLD VENIPUNCTURE: CPT

## 2021-05-09 PROCEDURE — 85025 COMPLETE CBC W/AUTO DIFF WBC: CPT

## 2021-05-09 PROCEDURE — 160041 HCHG SURGERY MINUTES - EA ADDL 1 MIN LEVEL 4: Performed by: OBSTETRICS & GYNECOLOGY

## 2021-05-09 PROCEDURE — 700111 HCHG RX REV CODE 636 W/ 250 OVERRIDE (IP): Performed by: ANESTHESIOLOGY

## 2021-05-09 PROCEDURE — 160029 HCHG SURGERY MINUTES - 1ST 30 MINS LEVEL 4: Performed by: OBSTETRICS & GYNECOLOGY

## 2021-05-09 PROCEDURE — 58611 LIGATE OVIDUCT(S) ADD-ON: CPT | Performed by: OBSTETRICS & GYNECOLOGY

## 2021-05-09 PROCEDURE — 160035 HCHG PACU - 1ST 60 MINS PHASE I: Performed by: OBSTETRICS & GYNECOLOGY

## 2021-05-09 PROCEDURE — 160048 HCHG OR STATISTICAL LEVEL 1-5: Performed by: OBSTETRICS & GYNECOLOGY

## 2021-05-09 PROCEDURE — 700102 HCHG RX REV CODE 250 W/ 637 OVERRIDE(OP): Performed by: OBSTETRICS & GYNECOLOGY

## 2021-05-09 PROCEDURE — 700102 HCHG RX REV CODE 250 W/ 637 OVERRIDE(OP): Performed by: ANESTHESIOLOGY

## 2021-05-09 PROCEDURE — 0UB70ZZ EXCISION OF BILATERAL FALLOPIAN TUBES, OPEN APPROACH: ICD-10-PCS | Performed by: OBSTETRICS & GYNECOLOGY

## 2021-05-09 PROCEDURE — 160002 HCHG RECOVERY MINUTES (STAT): Performed by: OBSTETRICS & GYNECOLOGY

## 2021-05-09 PROCEDURE — 700101 HCHG RX REV CODE 250: Performed by: ANESTHESIOLOGY

## 2021-05-09 PROCEDURE — 700105 HCHG RX REV CODE 258: Performed by: ANESTHESIOLOGY

## 2021-05-09 PROCEDURE — 770002 HCHG ROOM/CARE - OB PRIVATE (112)

## 2021-05-09 PROCEDURE — 160009 HCHG ANES TIME/MIN: Performed by: OBSTETRICS & GYNECOLOGY

## 2021-05-09 PROCEDURE — A9270 NON-COVERED ITEM OR SERVICE: HCPCS | Performed by: ANESTHESIOLOGY

## 2021-05-09 PROCEDURE — A9270 NON-COVERED ITEM OR SERVICE: HCPCS | Performed by: OBSTETRICS & GYNECOLOGY

## 2021-05-09 PROCEDURE — 88302 TISSUE EXAM BY PATHOLOGIST: CPT | Mod: 59

## 2021-05-09 RX ORDER — OXYCODONE HYDROCHLORIDE 5 MG/1
10 TABLET ORAL EVERY 4 HOURS PRN
Status: DISCONTINUED | OUTPATIENT
Start: 2021-05-09 | End: 2021-05-13 | Stop reason: HOSPADM

## 2021-05-09 RX ORDER — MORPHINE SULFATE 0.5 MG/ML
INJECTION, SOLUTION EPIDURAL; INTRATHECAL; INTRAVENOUS
Status: COMPLETED | OUTPATIENT
Start: 2021-05-09 | End: 2021-05-09

## 2021-05-09 RX ORDER — DIPHENHYDRAMINE HYDROCHLORIDE 50 MG/ML
25 INJECTION INTRAMUSCULAR; INTRAVENOUS EVERY 6 HOURS PRN
Status: DISCONTINUED | OUTPATIENT
Start: 2021-05-09 | End: 2021-05-10

## 2021-05-09 RX ORDER — CEFAZOLIN SODIUM 1 G/3ML
1 INJECTION, POWDER, FOR SOLUTION INTRAMUSCULAR; INTRAVENOUS ONCE
Status: DISCONTINUED | OUTPATIENT
Start: 2021-05-09 | End: 2021-05-09 | Stop reason: HOSPADM

## 2021-05-09 RX ORDER — OXYCODONE HCL 5 MG/5 ML
10 SOLUTION, ORAL ORAL
Status: COMPLETED | OUTPATIENT
Start: 2021-05-09 | End: 2021-05-09

## 2021-05-09 RX ORDER — BISACODYL 10 MG
10 SUPPOSITORY, RECTAL RECTAL PRN
Status: DISCONTINUED | OUTPATIENT
Start: 2021-05-09 | End: 2021-05-13 | Stop reason: HOSPADM

## 2021-05-09 RX ORDER — ACETAMINOPHEN 500 MG
1000 TABLET ORAL EVERY 6 HOURS
Status: COMPLETED | OUTPATIENT
Start: 2021-05-09 | End: 2021-05-10

## 2021-05-09 RX ORDER — HYDROMORPHONE HYDROCHLORIDE 1 MG/ML
0.2 INJECTION, SOLUTION INTRAMUSCULAR; INTRAVENOUS; SUBCUTANEOUS
Status: DISCONTINUED | OUTPATIENT
Start: 2021-05-09 | End: 2021-05-10

## 2021-05-09 RX ORDER — ACETAMINOPHEN 325 MG/1
325 TABLET ORAL EVERY 4 HOURS PRN
Status: DISCONTINUED | OUTPATIENT
Start: 2021-05-09 | End: 2021-05-13 | Stop reason: HOSPADM

## 2021-05-09 RX ORDER — KETOROLAC TROMETHAMINE 30 MG/ML
INJECTION, SOLUTION INTRAMUSCULAR; INTRAVENOUS PRN
Status: DISCONTINUED | OUTPATIENT
Start: 2021-05-09 | End: 2021-05-09 | Stop reason: SURG

## 2021-05-09 RX ORDER — DOCUSATE SODIUM 100 MG/1
100 CAPSULE, LIQUID FILLED ORAL 2 TIMES DAILY PRN
Status: DISCONTINUED | OUTPATIENT
Start: 2021-05-09 | End: 2021-05-13 | Stop reason: HOSPADM

## 2021-05-09 RX ORDER — OXYCODONE HYDROCHLORIDE 5 MG/1
10 TABLET ORAL EVERY 4 HOURS PRN
Status: DISCONTINUED | OUTPATIENT
Start: 2021-05-09 | End: 2021-05-10

## 2021-05-09 RX ORDER — METHYLERGONOVINE MALEATE 0.2 MG/ML
INJECTION INTRAVENOUS
Status: COMPLETED
Start: 2021-05-09 | End: 2021-05-09

## 2021-05-09 RX ORDER — METHYLERGONOVINE MALEATE 0.2 MG/ML
0.2 INJECTION INTRAVENOUS
Status: DISCONTINUED | OUTPATIENT
Start: 2021-05-09 | End: 2021-05-13 | Stop reason: HOSPADM

## 2021-05-09 RX ORDER — MEPERIDINE HYDROCHLORIDE 25 MG/ML
12.5 INJECTION INTRAMUSCULAR; INTRAVENOUS; SUBCUTANEOUS
Status: DISCONTINUED | OUTPATIENT
Start: 2021-05-09 | End: 2021-05-09 | Stop reason: HOSPADM

## 2021-05-09 RX ORDER — MIDAZOLAM HYDROCHLORIDE 1 MG/ML
1 INJECTION INTRAMUSCULAR; INTRAVENOUS
Status: DISCONTINUED | OUTPATIENT
Start: 2021-05-09 | End: 2021-05-09 | Stop reason: HOSPADM

## 2021-05-09 RX ORDER — DIPHENHYDRAMINE HYDROCHLORIDE 50 MG/ML
12.5 INJECTION INTRAMUSCULAR; INTRAVENOUS EVERY 6 HOURS PRN
Status: DISCONTINUED | OUTPATIENT
Start: 2021-05-09 | End: 2021-05-10

## 2021-05-09 RX ORDER — OXYCODONE HCL 5 MG/5 ML
5 SOLUTION, ORAL ORAL
Status: COMPLETED | OUTPATIENT
Start: 2021-05-09 | End: 2021-05-09

## 2021-05-09 RX ORDER — OXYCODONE HYDROCHLORIDE 5 MG/1
5 TABLET ORAL EVERY 4 HOURS PRN
Status: DISCONTINUED | OUTPATIENT
Start: 2021-05-09 | End: 2021-05-10

## 2021-05-09 RX ORDER — LABETALOL HYDROCHLORIDE 5 MG/ML
5 INJECTION, SOLUTION INTRAVENOUS
Status: DISCONTINUED | OUTPATIENT
Start: 2021-05-09 | End: 2021-05-09 | Stop reason: HOSPADM

## 2021-05-09 RX ORDER — DIPHENHYDRAMINE HYDROCHLORIDE 50 MG/ML
25 INJECTION INTRAMUSCULAR; INTRAVENOUS EVERY 6 HOURS PRN
Status: DISCONTINUED | OUTPATIENT
Start: 2021-05-09 | End: 2021-05-13 | Stop reason: HOSPADM

## 2021-05-09 RX ORDER — SODIUM CHLORIDE, SODIUM LACTATE, POTASSIUM CHLORIDE, CALCIUM CHLORIDE 600; 310; 30; 20 MG/100ML; MG/100ML; MG/100ML; MG/100ML
INJECTION, SOLUTION INTRAVENOUS PRN
Status: DISCONTINUED | OUTPATIENT
Start: 2021-05-09 | End: 2021-05-13 | Stop reason: HOSPADM

## 2021-05-09 RX ORDER — SIMETHICONE 80 MG
80 TABLET,CHEWABLE ORAL 4 TIMES DAILY PRN
Status: DISCONTINUED | OUTPATIENT
Start: 2021-05-09 | End: 2021-05-13 | Stop reason: HOSPADM

## 2021-05-09 RX ORDER — SODIUM CHLORIDE, SODIUM GLUCONATE, SODIUM ACETATE, POTASSIUM CHLORIDE AND MAGNESIUM CHLORIDE 526; 502; 368; 37; 30 MG/100ML; MG/100ML; MG/100ML; MG/100ML; MG/100ML
1500 INJECTION, SOLUTION INTRAVENOUS ONCE
Status: COMPLETED | OUTPATIENT
Start: 2021-05-09 | End: 2021-05-09

## 2021-05-09 RX ORDER — ONDANSETRON 2 MG/ML
INJECTION INTRAMUSCULAR; INTRAVENOUS PRN
Status: DISCONTINUED | OUTPATIENT
Start: 2021-05-09 | End: 2021-05-09 | Stop reason: SURG

## 2021-05-09 RX ORDER — HALOPERIDOL 5 MG/ML
1 INJECTION INTRAMUSCULAR
Status: DISCONTINUED | OUTPATIENT
Start: 2021-05-09 | End: 2021-05-09 | Stop reason: HOSPADM

## 2021-05-09 RX ORDER — IBUPROFEN 600 MG/1
600 TABLET ORAL EVERY 6 HOURS PRN
Status: DISCONTINUED | OUTPATIENT
Start: 2021-05-09 | End: 2021-05-13 | Stop reason: HOSPADM

## 2021-05-09 RX ORDER — METHYLERGONOVINE MALEATE 0.2 MG/ML
INJECTION INTRAVENOUS PRN
Status: DISCONTINUED | OUTPATIENT
Start: 2021-05-09 | End: 2021-05-09 | Stop reason: SURG

## 2021-05-09 RX ORDER — PHENYLEPHRINE HCL IN 0.9% NACL 0.5 MG/5ML
SYRINGE (ML) INTRAVENOUS PRN
Status: DISCONTINUED | OUTPATIENT
Start: 2021-05-09 | End: 2021-05-09 | Stop reason: SURG

## 2021-05-09 RX ORDER — SODIUM CHLORIDE, SODIUM LACTATE, POTASSIUM CHLORIDE, CALCIUM CHLORIDE 600; 310; 30; 20 MG/100ML; MG/100ML; MG/100ML; MG/100ML
INJECTION, SOLUTION INTRAVENOUS CONTINUOUS
Status: DISCONTINUED | OUTPATIENT
Start: 2021-05-09 | End: 2021-05-13 | Stop reason: HOSPADM

## 2021-05-09 RX ORDER — SODIUM CHLORIDE, SODIUM LACTATE, POTASSIUM CHLORIDE, CALCIUM CHLORIDE 600; 310; 30; 20 MG/100ML; MG/100ML; MG/100ML; MG/100ML
INJECTION, SOLUTION INTRAVENOUS CONTINUOUS
Status: DISCONTINUED | OUTPATIENT
Start: 2021-05-09 | End: 2021-05-11

## 2021-05-09 RX ORDER — HYDROMORPHONE HYDROCHLORIDE 1 MG/ML
0.4 INJECTION, SOLUTION INTRAMUSCULAR; INTRAVENOUS; SUBCUTANEOUS
Status: DISCONTINUED | OUTPATIENT
Start: 2021-05-09 | End: 2021-05-10

## 2021-05-09 RX ORDER — ONDANSETRON 2 MG/ML
4 INJECTION INTRAMUSCULAR; INTRAVENOUS EVERY 6 HOURS PRN
Status: DISCONTINUED | OUTPATIENT
Start: 2021-05-09 | End: 2021-05-10

## 2021-05-09 RX ORDER — ONDANSETRON 2 MG/ML
4 INJECTION INTRAMUSCULAR; INTRAVENOUS EVERY 6 HOURS PRN
Status: DISCONTINUED | OUTPATIENT
Start: 2021-05-09 | End: 2021-05-13 | Stop reason: HOSPADM

## 2021-05-09 RX ORDER — DIPHENHYDRAMINE HCL 25 MG
25 TABLET ORAL EVERY 6 HOURS PRN
Status: DISCONTINUED | OUTPATIENT
Start: 2021-05-09 | End: 2021-05-13 | Stop reason: HOSPADM

## 2021-05-09 RX ORDER — MORPHINE SULFATE 4 MG/ML
4 INJECTION, SOLUTION INTRAMUSCULAR; INTRAVENOUS
Status: DISCONTINUED | OUTPATIENT
Start: 2021-05-09 | End: 2021-05-13 | Stop reason: HOSPADM

## 2021-05-09 RX ORDER — ONDANSETRON 4 MG/1
4 TABLET, ORALLY DISINTEGRATING ORAL EVERY 6 HOURS PRN
Status: DISCONTINUED | OUTPATIENT
Start: 2021-05-09 | End: 2021-05-13 | Stop reason: HOSPADM

## 2021-05-09 RX ORDER — SODIUM CHLORIDE, SODIUM GLUCONATE, SODIUM ACETATE, POTASSIUM CHLORIDE AND MAGNESIUM CHLORIDE 526; 502; 368; 37; 30 MG/100ML; MG/100ML; MG/100ML; MG/100ML; MG/100ML
INJECTION, SOLUTION INTRAVENOUS
Status: DISCONTINUED | OUTPATIENT
Start: 2021-05-09 | End: 2021-05-09 | Stop reason: SURG

## 2021-05-09 RX ORDER — VITAMIN A ACETATE, BETA CAROTENE, ASCORBIC ACID, CHOLECALCIFEROL, .ALPHA.-TOCOPHEROL ACETATE, DL-, THIAMINE MONONITRATE, RIBOFLAVIN, NIACINAMIDE, PYRIDOXINE HYDROCHLORIDE, FOLIC ACID, CYANOCOBALAMIN, CALCIUM CARBONATE, FERROUS FUMARATE, ZINC OXIDE, CUPRIC OXIDE 3080; 12; 120; 400; 1; 1.84; 3; 20; 22; 920; 25; 200; 27; 10; 2 [IU]/1; UG/1; MG/1; [IU]/1; MG/1; MG/1; MG/1; MG/1; MG/1; [IU]/1; MG/1; MG/1; MG/1; MG/1; MG/1
1 TABLET, FILM COATED ORAL
Status: DISCONTINUED | OUTPATIENT
Start: 2021-05-09 | End: 2021-05-13 | Stop reason: HOSPADM

## 2021-05-09 RX ORDER — CEFAZOLIN SODIUM 1 G/3ML
INJECTION, POWDER, FOR SOLUTION INTRAMUSCULAR; INTRAVENOUS PRN
Status: DISCONTINUED | OUTPATIENT
Start: 2021-05-09 | End: 2021-05-09 | Stop reason: SURG

## 2021-05-09 RX ORDER — BUPIVACAINE HYDROCHLORIDE 7.5 MG/ML
INJECTION, SOLUTION INTRASPINAL
Status: COMPLETED | OUTPATIENT
Start: 2021-05-09 | End: 2021-05-09

## 2021-05-09 RX ORDER — OXYCODONE HYDROCHLORIDE AND ACETAMINOPHEN 5; 325 MG/1; MG/1
1 TABLET ORAL EVERY 4 HOURS PRN
Status: DISCONTINUED | OUTPATIENT
Start: 2021-05-09 | End: 2021-05-13 | Stop reason: HOSPADM

## 2021-05-09 RX ORDER — METOCLOPRAMIDE HYDROCHLORIDE 5 MG/ML
10 INJECTION INTRAMUSCULAR; INTRAVENOUS ONCE
Status: COMPLETED | OUTPATIENT
Start: 2021-05-09 | End: 2021-05-09

## 2021-05-09 RX ORDER — KETOROLAC TROMETHAMINE 30 MG/ML
30 INJECTION, SOLUTION INTRAMUSCULAR; INTRAVENOUS EVERY 6 HOURS
Status: DISCONTINUED | OUTPATIENT
Start: 2021-05-09 | End: 2021-05-10

## 2021-05-09 RX ORDER — OXYTOCIN 10 [USP'U]/ML
INJECTION, SOLUTION INTRAMUSCULAR; INTRAVENOUS PRN
Status: DISCONTINUED | OUTPATIENT
Start: 2021-05-09 | End: 2021-05-09 | Stop reason: SURG

## 2021-05-09 RX ORDER — MISOPROSTOL 200 UG/1
600 TABLET ORAL
Status: DISCONTINUED | OUTPATIENT
Start: 2021-05-09 | End: 2021-05-13 | Stop reason: HOSPADM

## 2021-05-09 RX ORDER — ONDANSETRON 2 MG/ML
4 INJECTION INTRAMUSCULAR; INTRAVENOUS
Status: DISCONTINUED | OUTPATIENT
Start: 2021-05-09 | End: 2021-05-09 | Stop reason: HOSPADM

## 2021-05-09 RX ORDER — DIPHENHYDRAMINE HYDROCHLORIDE 50 MG/ML
12.5 INJECTION INTRAMUSCULAR; INTRAVENOUS
Status: DISCONTINUED | OUTPATIENT
Start: 2021-05-09 | End: 2021-05-09 | Stop reason: HOSPADM

## 2021-05-09 RX ORDER — CITRIC ACID/SODIUM CITRATE 334-500MG
30 SOLUTION, ORAL ORAL ONCE
Status: COMPLETED | OUTPATIENT
Start: 2021-05-09 | End: 2021-05-09

## 2021-05-09 RX ADMIN — METHYLERGONOVINE MALEATE 200 MCG: 0.2 INJECTION, SOLUTION INTRAMUSCULAR; INTRAVENOUS at 10:19

## 2021-05-09 RX ADMIN — OXYTOCIN 30 UNITS: 10 INJECTION, SOLUTION INTRAMUSCULAR; INTRAVENOUS at 10:17

## 2021-05-09 RX ADMIN — ACETAMINOPHEN 1000 MG: 500 TABLET ORAL at 20:40

## 2021-05-09 RX ADMIN — OXYCODONE HYDROCHLORIDE 10 MG: 5 SOLUTION ORAL at 12:25

## 2021-05-09 RX ADMIN — DIPHENHYDRAMINE HYDROCHLORIDE 12.5 MG: 50 INJECTION INTRAMUSCULAR; INTRAVENOUS at 11:23

## 2021-05-09 RX ADMIN — OXYCODONE 10 MG: 5 TABLET ORAL at 16:25

## 2021-05-09 RX ADMIN — MORPHINE SULFATE 150 MCG: 0.5 INJECTION, SOLUTION EPIDURAL; INTRATHECAL; INTRAVENOUS at 09:45

## 2021-05-09 RX ADMIN — OXYCODONE 10 MG: 5 TABLET ORAL at 20:49

## 2021-05-09 RX ADMIN — KETOROLAC TROMETHAMINE 30 MG: 30 INJECTION, SOLUTION INTRAMUSCULAR at 11:03

## 2021-05-09 RX ADMIN — SODIUM CHLORIDE, SODIUM GLUCONATE, SODIUM ACETATE, POTASSIUM CHLORIDE AND MAGNESIUM CHLORIDE: 526; 502; 368; 37; 30 INJECTION, SOLUTION INTRAVENOUS at 09:43

## 2021-05-09 RX ADMIN — Medication 200 MCG: at 10:04

## 2021-05-09 RX ADMIN — CEFAZOLIN 2 G: 330 INJECTION, POWDER, FOR SOLUTION INTRAMUSCULAR; INTRAVENOUS at 09:49

## 2021-05-09 RX ADMIN — FAMOTIDINE 20 MG: 10 INJECTION INTRAVENOUS at 08:45

## 2021-05-09 RX ADMIN — DIPHENHYDRAMINE HYDROCHLORIDE 25 MG: 25 TABLET ORAL at 22:52

## 2021-05-09 RX ADMIN — ACETAMINOPHEN 1000 MG: 500 TABLET ORAL at 14:19

## 2021-05-09 RX ADMIN — ONDANSETRON 4 MG: 2 INJECTION INTRAMUSCULAR; INTRAVENOUS at 10:48

## 2021-05-09 RX ADMIN — METOCLOPRAMIDE 10 MG: 5 INJECTION, SOLUTION INTRAMUSCULAR; INTRAVENOUS at 08:45

## 2021-05-09 RX ADMIN — METHYLERGONOVINE MALEATE: 0.2 INJECTION, SOLUTION INTRAMUSCULAR; INTRAVENOUS at 10:30

## 2021-05-09 RX ADMIN — FENTANYL CITRATE 15 MCG: 50 INJECTION, SOLUTION INTRAMUSCULAR; INTRAVENOUS at 09:45

## 2021-05-09 RX ADMIN — SIMETHICONE 80 MG: 80 TABLET, CHEWABLE ORAL at 20:49

## 2021-05-09 RX ADMIN — SODIUM CITRATE AND CITRIC ACID MONOHYDRATE 30 ML: 500; 334 SOLUTION ORAL at 09:26

## 2021-05-09 RX ADMIN — BUPIVACAINE HYDROCHLORIDE IN DEXTROSE 1.4 ML: 7.5 INJECTION, SOLUTION SUBARACHNOID at 09:45

## 2021-05-09 RX ADMIN — KETOROLAC TROMETHAMINE 30 MG: 30 INJECTION, SOLUTION INTRAMUSCULAR; INTRAVENOUS at 18:01

## 2021-05-09 RX ADMIN — SODIUM CHLORIDE, SODIUM GLUCONATE, SODIUM ACETATE, POTASSIUM CHLORIDE AND MAGNESIUM CHLORIDE 1000 ML: 526; 502; 368; 37; 30 INJECTION, SOLUTION INTRAVENOUS at 08:35

## 2021-05-09 RX ADMIN — Medication 200 MCG: at 10:47

## 2021-05-09 ASSESSMENT — LIFESTYLE VARIABLES
HOW MANY TIMES IN THE PAST YEAR HAVE YOU HAD 5 OR MORE DRINKS IN A DAY: 0
TOTAL SCORE: 0
ON A TYPICAL DAY WHEN YOU DRINK ALCOHOL HOW MANY DRINKS DO YOU HAVE: 0
CONSUMPTION TOTAL: NEGATIVE
DOES PATIENT WANT TO STOP DRINKING: NO
ALCOHOL_USE: NO
TOTAL SCORE: 0
EVER FELT BAD OR GUILTY ABOUT YOUR DRINKING: NO
TOTAL SCORE: 0
EVER HAD A DRINK FIRST THING IN THE MORNING TO STEADY YOUR NERVES TO GET RID OF A HANGOVER: NO
HAVE PEOPLE ANNOYED YOU BY CRITICIZING YOUR DRINKING: NO
AVERAGE NUMBER OF DAYS PER WEEK YOU HAVE A DRINK CONTAINING ALCOHOL: 0
HAVE YOU EVER FELT YOU SHOULD CUT DOWN ON YOUR DRINKING: NO

## 2021-05-09 ASSESSMENT — PATIENT HEALTH QUESTIONNAIRE - PHQ9
1. LITTLE INTEREST OR PLEASURE IN DOING THINGS: NOT AT ALL
2. FEELING DOWN, DEPRESSED, IRRITABLE, OR HOPELESS: NOT AT ALL
SUM OF ALL RESPONSES TO PHQ9 QUESTIONS 1 AND 2: 0

## 2021-05-09 ASSESSMENT — COPD QUESTIONNAIRES
DO YOU EVER COUGH UP ANY MUCUS OR PHLEGM?: NO/ONLY WITH OCCASIONAL COLDS OR INFECTIONS
HAVE YOU SMOKED AT LEAST 100 CIGARETTES IN YOUR ENTIRE LIFE: NO/DON'T KNOW
IN THE PAST 12 MONTHS DO YOU DO LESS THAN YOU USED TO BECAUSE OF YOUR BREATHING PROBLEMS: DISAGREE/UNSURE
COPD SCREENING SCORE: 0
DURING THE PAST 4 WEEKS HOW MUCH DID YOU FEEL SHORT OF BREATH: NONE/LITTLE OF THE TIME

## 2021-05-09 ASSESSMENT — PAIN SCALES - GENERAL
PAIN_LEVEL: 0
PAINLEVEL: 0 - NO PAIN

## 2021-05-09 ASSESSMENT — PAIN DESCRIPTION - PAIN TYPE
TYPE: SURGICAL PAIN

## 2021-05-09 ASSESSMENT — FIBROSIS 4 INDEX: FIB4 SCORE: 0.47

## 2021-05-09 NOTE — PROGRESS NOTES
1330) Received pt from L&D PACU, pt stable at this time, olvera draining, iv patent, pain 5/10 incisional, pt states pain level satisfactory for her, ice pack applied to incision.  FF, scant amt lochia. BS hypo, tolerating clears well, apple juice given to pt.  Bed in low position, side rails up x 2, CLIP and reviewed how to call the nurse.  No needs at this time.   1600) Pt requesting pain meds when next scheduled, will report that information to PP RN   1615) Report to EVERARDO Javier RN to assume care of pt, aware of pt request for pain meds at 1630

## 2021-05-09 NOTE — ANESTHESIA POSTPROCEDURE EVALUATION
Patient: Brenda Westbrook    Procedure Summary     Date: 21 Room / Location: [facility] OR 01 / SURGERY LABOR AND DELIVERY    Anesthesia Start: 943 Anesthesia Stop:     Procedure:  SECTION, REPEAT, WITH SALPINGECTOMY (Bilateral ) Diagnosis:       (PREVIOUS  SECTION, PERMANENT STERILIZATION)      (39+1 WEEKS)    Surgeons: Jorge Alvarenga M.D. Responsible Provider: Eldon Friedman M.D.    Anesthesia Type: spinal ASA Status: 2          Final Anesthesia Type: spinal  Last vitals  BP   Blood Pressure: 111/73    Temp   37.2 °C (99 °F)    Pulse   82   Resp   16    SpO2   97 %      Anesthesia Post Evaluation    Patient location during evaluation: PACU  Patient participation: complete - patient participated  Level of consciousness: awake and alert  Pain score: 0    Airway patency: patent  Anesthetic complications: no  Cardiovascular status: adequate and hemodynamically stable  Respiratory status: acceptable  Hydration status: acceptable    PONV: none          No complications documented.

## 2021-05-09 NOTE — ANESTHESIA PREPROCEDURE EVALUATION
26yoF  @ 39+1 wks     PMhx of asthma, migraines  Hx of c/s    Allergies to Cipro, doxycycline, codeine, keflex  NPO  No AC  Plt 171  Covid neg    Relevant Problems   PULMONARY   (+) Asthma, exercise induced      CARDIAC   (+) Intractable migraine without aura and without status migrainosus      OB   (+) Hx of  section x2   (+) S/P  section       Physical Exam    Airway   Mallampati: II       Cardiovascular - normal exam     Dental - normal exam           Pulmonary - normal exam     Abdominal - normal exam     Neurological - normal exam                 Anesthesia Plan    ASA 2       Plan - spinal   Neuraxial block will be primary anesthetic            Induction: intravenous    Postoperative Plan: Postoperative administration of opioids is intended.    Pertinent diagnostic labs and testing reviewed    Informed Consent:    Anesthetic plan and risks discussed with patient.

## 2021-05-09 NOTE — PROGRESS NOTES
0730: Patient arrived to [facility] for schedule 0930 Repeat C/S with Bilateral Salpingectomy.     1017: Delivery of female infant by Dr. Alvarenga Bilateral salpingectomy perform, Moderate bleeding methergine given per Dr. Friedman, APGARS 8/8, baby had difficulty maintaining O2 above 90% [employer] are present for delivery. Rapid Response called.     1035: Esthela Dash RN at bedside, IV started in left hand, IV bolus given. Baby transferred to NICU and on Bubble CPAP.     1109: Patient in PACU bed 3, uneventful recovery.     Anticipate transfer to Postpartum

## 2021-05-09 NOTE — CARE PLAN
0650) Received pt from L&D PACU, pt stable at this time, olvera draining, iv patent, pain 5/10 incisional, pt states pain level satisfactory for her, ice pack applied to incision.  FF, scant amt lochia. BS hypo, tolerating clears well, apple juice given to pt.  Bed in low position, side rails up x 2, CLIP and reviewed how to call the nurse.  No needs at this time.      Problem: Communication  Goal: The ability to communicate needs accurately and effectively will improve  Outcome: PROGRESSING AS EXPECTED     Problem: Safety  Goal: Will remain free from injury  Outcome: PROGRESSING AS EXPECTED  Goal: Will remain free from falls  Outcome: PROGRESSING AS EXPECTED     Problem: Infection  Goal: Will remain free from infection  Outcome: PROGRESSING AS EXPECTED     Problem: Venous Thromboembolism (VTW)/Deep Vein Thrombosis (DVT) Prevention:  Goal: Patient will participate in Venous Thrombosis (VTE)/Deep Vein Thrombosis (DVT)Prevention Measures  Outcome: PROGRESSING AS EXPECTED     Problem: Bowel/Gastric:  Goal: Normal bowel function is maintained or improved  Outcome: PROGRESSING AS EXPECTED  Goal: Will not experience complications related to bowel motility  Outcome: PROGRESSING AS EXPECTED     Problem: Knowledge Deficit  Goal: Knowledge of disease process/condition, treatment plan, diagnostic tests, and medications will improve  Outcome: PROGRESSING AS EXPECTED  Goal: Knowledge of the prescribed therapeutic regimen will improve  Outcome: PROGRESSING AS EXPECTED     Problem: Discharge Barriers/Planning  Goal: Patient's continuum of care needs will be met  Outcome: PROGRESSING AS EXPECTED     Problem: Pain Management  Goal: Pain level will decrease to patient's comfort goal  Outcome: PROGRESSING AS EXPECTED     Problem: Altered physiologic condition related to postoperative  delivery  Goal: Patient physiologically stable as evidenced by normal lochia, palpable uterine involution and vital signs within normal  limits  Outcome: PROGRESSING AS EXPECTED     Problem: Potential for postpartum infection related to surgical incision, compromised uterine condition, urinary tract or respiratory compromise  Goal: Patient will be afebrile and free from signs and symptoms of infection  Outcome: PROGRESSING AS EXPECTED     Problem: Alteration in comfort related to surgical incision and/or after birth pains  Goal: Patient is able to ambulate, care for self and infant with acceptable pain level  Outcome: PROGRESSING AS EXPECTED  Goal: Patient verbalizes acceptable pain level  Outcome: PROGRESSING AS EXPECTED     Problem: Potential knowledge deficit related to lack of understanding of self and  care  Goal: Patient will verbalize understanding of self and infant care  Outcome: PROGRESSING AS EXPECTED  Goal: Patient will demonstrate ability to care for self and infant  Outcome: PROGRESSING AS EXPECTED     Problem: Potential anxiety related to difficulty adapting to parental role  Goal: Patient will verbalize and demonstrate effective bonding and parenting behavior  Outcome: PROGRESSING AS EXPECTED

## 2021-05-09 NOTE — ANESTHESIA PROCEDURE NOTES
Spinal Block    Date/Time: 5/9/2021 9:45 AM  Performed by: Eldon Friedman M.D.  Authorized by: Eldon Firedman M.D.     Patient Location:  OR  Start Time:  5/9/2021 9:45 AM  End Time:  5/9/2021 9:47 AM  Reason for Block: primary anesthetic    patient identified, IV checked, site marked, risks and benefits discussed, surgical consent, monitors and equipment checked, pre-op evaluation and timeout performed    Patient Position:  Sitting  Prep: ChloraPrep    Monitoring:  Blood pressure, cardiac monitor, continuous pulse oximetry and heart rate  Approach:  Midline  Location:  L4-5  Injection Technique:  Single-shot  Skin infiltration:  Lidocaine  Strength:  1%  Dose:  3ml  Needle Type:  Mellissa  Needle Gauge:  25 G  CSF flowing pre/post injection:  Yes  Sensory Level:  T4

## 2021-05-09 NOTE — OP REPORT
DATE OF SERVICE:  2021     PREOPERATIVE DIAGNOSIS:  Intrauterine pregnancy at 39 and 1/7 weeks with   history of previous  section and desire for permanent sterilization.     POSTOPERATIVE DIAGNOSIS:  Intrauterine pregnancy at 39 and 1/7 weeks with   history of previous  section and desire for permanent sterilization.     SURGEON:  Jorge Alvarenga MD     ASSISTANT:  ____      ANESTHESIA:  Spinal.     ANESTHESIOLOGIST:  Eldon Friedman MD     PROCEDURES:  1.  Repeat low transverse uterine  section.  2.  Bilateral salpingectomy.     ESTIMATED BLOOD LOSS:  700 mL.     SPECIMENS:  Bilateral fallopian tubes sent to pathology department..     COMPLICATIONS:  None.     DRAINS:  Alexander catheter.     INDICATIONS:  This patient is a 26-year-old white female  3, para   2-0-0-2 at 39 and 1/7 weeks' intrauterine pregnancy.  The patient's OB history   is complicated by previous  section x2, and desire for permanent   sterilization.     FINDINGS:  Apgar scores 8 and 8.  Significant adhesions from the bladder to   the anterior portion of the uterus.  In addition, the rectus fascia was very   thin and attenuated.  Cephalic presentation.  Normal fallopian tubes and   ovaries, normal uterus.     DESCRIPTION OF PROCEDURE:  After adequately being counseled, the patient was   taken to the operating room and placed in supine position.  A surgical timeout   was performed and the patient was prepped and draped prior to that.    Pfannenstiel skin incision was made over the previous one, taken down to the   fascia.  The fascia was incised with a scalpel.  Fascial incision taken   laterally on both sides with Balderas scissors.  Rectus fascia dissected off the   underlying rectus muscles both superiorly and inferiorly and the rectus   muscles were split in the midline.  Peritoneal cavity was entered sharply by   elevating the peritoneal lining using hemostats and incising with a scalpel.    Peritoneal  incision was taken superiorly and inferiorly and the adhesions from   the bladder to the anterior portion of the uterus were taken down sharply and   a bladder blade replaced over the bladder.  Next, a low transverse uterine   incision was made with a scalpel.  The infant was delivered, bulb suctioned.    Umbilical cord clamped and cut and the infant handed off to pediatrics.  The   uterus was found to be fairly atonic and the patient was given Pitocin IV   fluids, followed by Methergine 0.2 mg intramuscularly by anesthesia staff.    Next, the uterus was exteriorized.  Uterine cavity was cleansed with a moist   laparotomy sponge after the placenta had been removed.  Uterine incision was   closed in 2 layers, first layer was a running locking stitch of 0 Vicryl and   the second layer was a running locking stitch of 0 chromic.  Good hemostasis   noted.  The right fallopian tube was grasped in its distal portion using a   Joseph clamp and the LigaSure device was used to cauterize and cut along the   mesosalpinx parallel to the fallopian tube, transecting and sending the   fallopian tube off for pathologic evaluation, diagnosis.  This was then   performed in similar fashion on the contralateral fallopian tube with good   hemostasis noted bilaterally.  Uterus was then returned to the abdominal   pelvic cavity and the pelvis was irrigated and suctioned and all surgical   sites checked very carefully to confirm good hemostasis, which was found to be   the case.  Next, the peritoneal lining was grasped with a hemostat and the   peritoneal lining was closed using running nonlocked stitch of 0 Vicryl.    Rectus muscles were reapproximated in the midline using interrupted stitch of   0 chromic.  The rectus fascia was closed using a running nonlocked stitch of   Vicryl suture.  Of note, it was fairly thin and attenuated in several areas   and we took very special care to provide closely spaced sutures along the   fascia to  close it carefully.  Next, the subcutaneous tissues were irrigated   and suctioned and electrocautery used for hemostasis.  Several subcuticular   stitches of 0 Vicryl were used to reapproximate the subcutaneous tissues and   the skin was closed using surgical staples.  Pressure dressing was applied.    Instrument counts were found to be correct.        ______________________________  MD MILLIE RODRIGUES/WILBERT/FRANCISCO JAVIER    DD:  05/09/2021 11:17  DT:  05/09/2021 11:53    Job#:  [id number]

## 2021-05-09 NOTE — LACTATION NOTE
This note was copied from a baby's chart.  Baby 39.1 weeks in NICU, , MOB experienced at breastfeeding. Baby 5 hours old, initiated pumping, flange fit 28.5 mm, suction 30%. Mother pumped with drops on flange then hand expressed 1.5 ml of colostrum from right breast. Reviewed with mother how to collect colostrum then label with time & date and take to NICU for baby. MOB has [facility] encouraged mother to follow-up with [facility] for op breastfeeding support.     Information sheets given:  1. Pumping schedule  2. Storage & Prep of BM, CDC  3. Breastfeeding Support & Zoom

## 2021-05-09 NOTE — H&P
History and Physical    Brenda Westbrook is a 26 y.o. female  at 39w1d who presents for repeat .  She states that her pregnancy has been uncomplicated.  She currently appears well and has no complaints.    Subjective:   CTXs: positive   Pain: negative  LOF: negative  Vaginal bleeding: negative   Fetal movement: positive    ROS: Pertinent positives documented in HPI and all other systems reviewed & are negative    OB History    Para Term  AB Living   3 2 2 0 0 2   SAB TAB Ectopic Molar Multiple Live Births   0 0 0     2      # Outcome Date GA Lbr Bhupinder/2nd Weight Sex Delivery Anes PTL Lv   3 Current            2 Term 09/15/18 40w1d  2.545 kg (5 lb 9.8 oz) M CS-LTranv   MARYSOL   1 Term 16 41w0d  3.09 kg (6 lb 13 oz) M CS-LTranv Spinal N MARYSOL      Birth Comments: double nuchal cord, NRFS      Complications: Fetal Intolerance       PGYNHx: She states that her last Pap was 9 months ago and normal.  She states she has a history of endometriosis and a history of a perforated uterus secondary to an IUD which had to be surgically removed.    Past Medical History:   Diagnosis Date   • Asthma, exercise induced    • Bronchitis    • Chlamydia     Oct 2011   • Cold     02/6/15   • Endometriosis    • Head ache    • Indigestion    • Intractable migraine without aura and without status migrainosus 2017    Referral to neuro   • Other specified symptom associated with female genital organs     endometriosis   • Pain     pelvic   • Pain 12    stomach   • Psychiatric problem     depression   • Sleep disturbances    • Unspecified disorder of thyroid 2015    hyperactive in the past not taking any meds currently   • Urinary bladder disorder     frequent uti's       Past Surgical History:   Procedure Laterality Date   • REPEAT C SECTION N/A 9/15/2018    Procedure: REPEAT C SECTION;  Surgeon: Rachna Patterson M.D.;  Location: LABOR AND DELIVERY;  Service: Labor and Delivery   •  PRIMARY C SECTION Bilateral 6/11/2016    Procedure: PRIMARY C SECTION;  Surgeon: Trever Beckman M.D.;  Location: LABOR AND DELIVERY;  Service:    • MANNY BY LAPAROSCOPY  3/12/2015    Procedure: MANNY BY LAPAROSCOPY;  Surgeon: Sean Mitchell M.D.;  Location: SURGERY [facility];  Service:    • PELVISCOPY  2013   • CYSTOSCOPY  8/30/2012    Performed by QUINTIN LOPES at SURGERY [facility]   • PELVISCOPY  4/19/2012    Performed by CHRISTIANO PUGH at SURGERY SAME DAY [facility]   • MYRINGOTOMY     • STRABISMUS REPAIR      gerardo eyes   • TONSILLECTOMY     She has a history of 3 surgeries for endometriosis  In April 2019 an IUD had to be surgically removed secondary to a uterine perforation.   She also has history of cholecystectomy      Current Facility-Administered Medications:   •  electrolyte-A (PLASMALYTE-A) infusion 1,500 mL, 1,500 mL, Intravenous, Once, Eldon Friedman M.D.  •  Na citrate-citric acid (BICITRA) 500-334 MG/5ML solution 30 mL, 30 mL, Oral, Once, Eldon Friedman M.D.  •  famotidine (PEPCID) injection 20 mg, 20 mg, Intravenous, Once, Eldon Friedman M.D.  •  metoclopramide (REGLAN) injection 10 mg, 10 mg, Intravenous, Once, Eldon Friedman M.D.  •  ondansetron (ZOFRAN) syringe/vial injection 4 mg, 4 mg, Intravenous, Once PRN, Eldon Friedman M.D.  •  haloperidol lactate (HALDOL) injection 1 mg, 1 mg, Intravenous, Q15 MIN PRN, Eldon Friedman M.D.  •  diphenhydrAMINE (BENADRYL) injection 12.5 mg, 12.5 mg, Intravenous, Q15 MIN PRN, Eldon Friedman M.D.  •  fentaNYL (SUBLIMAZE) injection 25 mcg, 25 mcg, Intravenous, Q2 MIN PRN **OR** fentaNYL (SUBLIMAZE) injection 50 mcg, 50 mcg, Intravenous, Q2 MIN PRN, Eldon Friedman M.D.  •  oxyCODONE (ROXICODONE) oral solution 5 mg, 5 mg, Oral, Once PRN **OR** oxyCODONE (ROXICODONE) oral solution 10 mg, 10 mg, Oral, Once PRN, Eldon Friedman M.D.  •  lactated ringers infusion, , Intravenous, Continuous, Eldon Friedman,  M.D.  •  midazolam (Versed) 2 MG/2ML injection 1 mg, 1 mg, Intravenous, Q5 MIN PRN, Eldon Friedman M.D.  •  meperidine (DEMEROL) injection 12.5 mg, 12.5 mg, Intravenous, Q5 MIN PRN, Eldon Friedman M.D.  •  ePHEDrine injection 5 mg, 5 mg, Intravenous, Q5 MIN PRN, Eldon Friedman M.D.  •  labetalol (NORMODYNE/TRANDATE) injection 5 mg, 5 mg, Intravenous, Q5 MIN PRN, Eldon Friedman M.D.  •  albuterol (PROVENTIL) 2.5mg/0.5ml nebulizer solution 2.5 mg, 2.5 mg, Inhalation, Q10 MIN PRN, Eldon Friedman M.D.  •  lactated ringers (LR) infusion, , Intravenous, Continuous, Jorge Alvarenga M.D.  •  ceFAZolin (ANCEF) injection 1 g, 1 g, Intravenous, Once, Jorge Alvarenga M.D.    Allergies: Ciprofloxacin, Codeine, Doxycycline, and Keflex    Social History     Socioeconomic History   • Marital status:      Spouse name: Not on file   • Number of children: Not on file   • Years of education: Not on file   • Highest education level: 12th grade   Occupational History   • Not on file   Tobacco Use   • Smoking status: Never Smoker   • Smokeless tobacco: Never Used   Substance and Sexual Activity   • Alcohol use: Not Currently   • Drug use: No   • Sexual activity: Yes     Partners: Male     Birth control/protection: Condom   Other Topics Concern   • Not on file   Social History Narrative    ** Merged History Encounter **          Social Determinants of Health     Financial Resource Strain: Low Risk    • Difficulty of Paying Living Expenses: Not very hard   Food Insecurity: No Food Insecurity   • Worried About Running Out of Food in the Last Year: Never true   • Ran Out of Food in the Last Year: Never true   Transportation Needs: No Transportation Needs   • Lack of Transportation (Medical): No   • Lack of Transportation (Non-Medical): No   Physical Activity: Inactive   • Days of Exercise per Week: 0 days   • Minutes of Exercise per Session: 0 min   Stress: No Stress Concern Present   • Feeling of Stress : Only a little  "  Social Connections: Somewhat Isolated   • Frequency of Communication with Friends and Family: Three times a week   • Frequency of Social Gatherings with Friends and Family: Once a week   • Attends Caodaism Services: Never   • Active Member of Clubs or Organizations: No   • Attends Club or Organization Meetings: Never   • Marital Status:    Intimate Partner Violence:    • Fear of Current or Ex-Partner:    • Emotionally Abused:    • Physically Abused:    • Sexually Abused:      She denies any smoking, alcohol, or illicit drugs during the pregnancy.    FamHx:   Congenital anomalies denies   Chromosomal disorders denies   Inherited MR denies   Blood dyscrasias denies     Prenatal care with following problems:  Patient Active Problem List    Diagnosis Date Noted   • High-risk pregnancy in third trimester 2021     Priority: High   • Hx of  section x2 2018     Priority: High   • Urinary tract infection in mother during third trimester of pregnancy 2021   • S/P  section 2018   • Endometriosis determined by laparoscopy 2018   • Biological false positive RPR test 2018   • Intractable migraine without aura and without status migrainosus 2017   • Asthma, exercise induced          Objective:      /73   Pulse 82   Temp 37.2 °C (99 °F)   Resp 16   Ht 1.6 m (5' 3\")   Wt 70.3 kg (155 lb)   SpO2 97%     General:   no acute distress   Skin:   normal   HEENT:  PERRLA   Lungs:   CTA bilateral, chest is clear without rales or wheezing   Heart:   S1, S2 normal, no murmur, click, rub or gallop, regular rate and rhythm, no pedal edema   Abdomen:   soft, gravid, NT   EFW:  3000g    Pelvis:  adequate with gynecoid pelvis   FHT:  150 BPM  Accels present  Decels absent  Variability moderate  Category I   Uterine Size: S=D   Presentations: Possibly transverse                                   Lab Review  Lab:   Blood type: A     Recent Results (from the past 5880 " hour(s))   POCT Pregnancy    Collection Time: 12/14/20  9:10 AM   Result Value Ref Range    POC Urine Pregnancy Test Positive Negative    Internal Control Positive Positive     Internal Control Negative Negative    THINPREP RFLX HPV ASCUS W/CTNG    Collection Time: 01/18/21  9:38 AM   Result Value Ref Range    Cytology Reg See Path Report     C. trachomatis by PCR Negative Negative    N. gonorrhoeae by PCR Negative Negative    Source Cervical    POC UA    Collection Time: 01/26/21 11:24 AM   Result Value Ref Range    POC Color Yellow     POC Appearance Clear     POC Glucose Negative Negative mg/dL    POC Ketones Negative Negative mg/dL    POC Specific Gravity 1.020 1.005 - 1.030    POC Blood Negative Negative    POC Urine PH 7.0 5.0 - 8.0    POC Protein Negative Negative mg/dL    POC Nitrites Negative Negative    POC Leukocyte Esterase Trace (A) Negative   CBC WITHOUT DIFFERENTIAL    Collection Time: 01/26/21 12:48 PM   Result Value Ref Range    WBC 6.9 4.8 - 10.8 K/uL    RBC 3.41 (L) 4.20 - 5.40 M/uL    Hemoglobin 10.8 (L) 12.0 - 16.0 g/dL    Hematocrit 32.5 (L) 37.0 - 47.0 %    MCV 95.3 81.4 - 97.8 fL    MCH 31.7 27.0 - 33.0 pg    MCHC 33.2 (L) 33.6 - 35.0 g/dL    RDW 48.6 35.9 - 50.0 fL    Platelet Count 187 164 - 446 K/uL    MPV 9.6 9.0 - 12.9 fL   Comp Metabolic Panel    Collection Time: 01/26/21 12:48 PM   Result Value Ref Range    Sodium 136 135 - 145 mmol/L    Potassium 3.7 3.6 - 5.5 mmol/L    Chloride 106 96 - 112 mmol/L    Co2 21 20 - 33 mmol/L    Anion Gap 9.0 7.0 - 16.0    Glucose 76 65 - 99 mg/dL    Bun 4 (L) 8 - 22 mg/dL    Creatinine 0.49 (L) 0.50 - 1.40 mg/dL    Calcium 8.7 8.5 - 10.5 mg/dL    AST(SGOT) 13 12 - 45 U/L    ALT(SGPT) 7 2 - 50 U/L    Alkaline Phosphatase 52 30 - 99 U/L    Total Bilirubin <0.2 0.1 - 1.5 mg/dL    Albumin 3.5 3.2 - 4.9 g/dL    Total Protein 5.9 (L) 6.0 - 8.2 g/dL    Globulin 2.4 1.9 - 3.5 g/dL    A-G Ratio 1.5 g/dL   ESTIMATED GFR    Collection Time: 01/26/21 12:48 PM      Result Value Ref Range    GFR If African American >60 >60 mL/min/1.73 m 2    GFR If Non African American >60 >60 mL/min/1.73 m 2   PRENATAL PANEL 3+HIV+HCV    Collection Time: 01/26/21  2:39 PM   Result Value Ref Range    WBC 7.3 4.8 - 10.8 K/uL    RBC 3.75 (L) 4.20 - 5.40 M/uL    Hemoglobin 11.6 (L) 12.0 - 16.0 g/dL    Hematocrit 35.6 (L) 37.0 - 47.0 %    MCV 94.9 81.4 - 97.8 fL    MCH 30.9 27.0 - 33.0 pg    MCHC 32.6 (L) 33.6 - 35.0 g/dL    RDW 48.2 35.9 - 50.0 fL    Platelet Count 203 164 - 446 K/uL    MPV 9.7 9.0 - 12.9 fL    Neutrophils-Polys 70.50 44.00 - 72.00 %    Lymphocytes 22.60 22.00 - 41.00 %    Monocytes 5.50 0.00 - 13.40 %    Eosinophils 0.40 0.00 - 6.90 %    Basophils 0.30 0.00 - 1.80 %    Immature Granulocytes 0.70 0.00 - 0.90 %    Nucleated RBC 0.00 /100 WBC    Neutrophils (Absolute) 5.14 2.00 - 7.15 K/uL    Lymphs (Absolute) 1.65 1.00 - 4.80 K/uL    Monos (Absolute) 0.40 0.00 - 0.85 K/uL    Eos (Absolute) 0.03 0.00 - 0.51 K/uL    Baso (Absolute) 0.02 0.00 - 0.12 K/uL    Immature Granulocytes (abs) 0.05 0.00 - 0.11 K/uL    NRBC (Absolute) 0.00 K/uL    Rubella IgG Antibody 292.00 IU/mL    Hepatitis B Surface Antigen Non-Reactive Non-Reactive    Hepatitis C Antibody Non-Reactive Non-Reactive    Syphilis, Treponemal Qual Non-Reactive Non-Reactive   HIV AG/AB COMBO ASSAY SCREENING    Collection Time: 01/26/21  2:39 PM   Result Value Ref Range    HIV Ag/Ab Combo Assay Non-Reactive Non Reactive   OP Prenatal Panel-Blood Bank    Collection Time: 01/26/21  2:39 PM   Result Value Ref Range    ABO Grouping Only A     Rh Grouping Only POS     Antibody Screen Scrn NEG    CBC WITH DIFFERENTIAL    Collection Time: 03/10/21  9:43 AM   Result Value Ref Range    WBC 7.1 4.8 - 10.8 K/uL    RBC 3.46 (L) 4.20 - 5.40 M/uL    Hemoglobin 10.9 (L) 12.0 - 16.0 g/dL    Hematocrit 33.8 (L) 37.0 - 47.0 %    MCV 97.7 81.4 - 97.8 fL    MCH 31.5 27.0 - 33.0 pg    MCHC 32.2 (L) 33.6 - 35.0 g/dL    RDW 51.3 (H) 35.9 - 50.0 fL     Platelet Count 175 164 - 446 K/uL    MPV 10.6 9.0 - 12.9 fL    Neutrophils-Polys 72.20 (H) 44.00 - 72.00 %    Lymphocytes 18.60 (L) 22.00 - 41.00 %    Monocytes 7.50 0.00 - 13.40 %    Eosinophils 0.60 0.00 - 6.90 %    Basophils 0.30 0.00 - 1.80 %    Immature Granulocytes 0.80 0.00 - 0.90 %    Nucleated RBC 0.00 /100 WBC    Neutrophils (Absolute) 5.11 2.00 - 7.15 K/uL    Lymphs (Absolute) 1.32 1.00 - 4.80 K/uL    Monos (Absolute) 0.53 0.00 - 0.85 K/uL    Eos (Absolute) 0.04 0.00 - 0.51 K/uL    Baso (Absolute) 0.02 0.00 - 0.12 K/uL    Immature Granulocytes (abs) 0.06 0.00 - 0.11 K/uL    NRBC (Absolute) 0.00 K/uL   T.PALLIDUM AB EIA    Collection Time: 03/10/21  9:43 AM   Result Value Ref Range    Syphilis, Treponemal Qual Non-Reactive Non-Reactive   GLUCOSE 1HR GESTATIONAL    Collection Time: 03/10/21  9:43 AM   Result Value Ref Range    Glucose, Post Dose 128 70 - 139 mg/dL   POC UA    Collection Time: 03/27/21  8:39 PM   Result Value Ref Range    POC Color Alison     POC Appearance Cloudy (A)     POC Glucose Negative Negative mg/dL    POC Ketones 40 (A) Negative mg/dL    POC Specific Gravity 1.025 1.005 - 1.030    POC Blood Trace-intact (A) Negative    POC Urine PH 6.0 5.0 - 8.0    POC Protein 30 (A) Negative mg/dL    POC Nitrites Negative Negative    POC Leukocyte Esterase Negative Negative   fFN if less than 34 wks gestation - must be prior to vaginal exam    Collection Time: 03/27/21  8:55 PM   Result Value Ref Range    Fetal Fibronectin Negative    POCT Urinalysis    Collection Time: 03/30/21  9:21 AM   Result Value Ref Range    POC Color YELLOW Negative    POC Appearance CLOUDY Negative    POC Leukocyte Esterase TRACE Negative    POC Nitrites NEGATIVE Negative    POC Urobiligen N/A Negative (0.2) mg/dL    POC Protein TRACE Negative mg/dL    POC Urine PH 7.0 5.0 - 8.0    POC Blood NEGATIVE Negative    POC Specific Gravity 1.020 <1.005 - >1.030    POC Ketones NEGATIVE Negative mg/dL    POC Bilirubin N/A Negative  mg/dL    POC Glucose NEGATIVE Negative mg/dL   URINE CULTURE(NEW)    Collection Time: 03/30/21  9:21 AM    Specimen: Urine   Result Value Ref Range    Significant Indicator NEG     Source UR     Site -     Culture Result Usual skin joe <10,000 cfu/mL    POCT Fetal Nonstress Test    Collection Time: 04/06/21  9:40 AM   Result Value Ref Range    NST Indications Decreased fetal movements     NST Baseline 130s     NST Uterine Activity No     NST Acoustic Stimulation No     NST Assessment       Reactive NST without decelerations.  Patient marked normal movements while on monitor    NST Action Necessary      NST Other Data      NST Return      NST Read By JEANCARLOS CASTLE [[id number]]    GRP B STREP, BY PCR (RICHARD BROTH)    Collection Time: 04/20/21  8:32 AM    Specimen: Genital   Result Value Ref Range    Strep Gp B DNA PCR Negative Negative   BILE ACIDS, SERUM    Collection Time: 05/05/21  8:20 AM   Result Value Ref Range    Bile Acids Serum 3 0 - 10 umol/L   Comp Metabolic Panel    Collection Time: 05/05/21  8:20 AM   Result Value Ref Range    Sodium 139 135 - 145 mmol/L    Potassium 4.1 3.6 - 5.5 mmol/L    Chloride 107 96 - 112 mmol/L    Co2 21 20 - 33 mmol/L    Anion Gap 11.0 7.0 - 16.0    Glucose 80 65 - 99 mg/dL    Bun 5 (L) 8 - 22 mg/dL    Creatinine 0.55 0.50 - 1.40 mg/dL    Calcium 8.8 8.5 - 10.5 mg/dL    AST(SGOT) 8 (L) 12 - 45 U/L    ALT(SGPT) 7 2 - 50 U/L    Alkaline Phosphatase 104 (H) 30 - 99 U/L    Total Bilirubin 0.2 0.1 - 1.5 mg/dL    Albumin 3.2 3.2 - 4.9 g/dL    Total Protein 5.9 (L) 6.0 - 8.2 g/dL    Globulin 2.7 1.9 - 3.5 g/dL    A-G Ratio 1.2 g/dL   ESTIMATED GFR    Collection Time: 05/05/21  8:20 AM   Result Value Ref Range    GFR If African American >60 >60 mL/min/1.73 m 2    GFR If Non African American >60 >60 mL/min/1.73 m 2   SARS-CoV-2 PCR (24 hour In-House): Collect NP swab in VTM    Collection Time: 05/06/21  9:35 AM    Specimen: Nasopharyngeal; Respirate   Result Value Ref Range    SARS-CoV-2  Source NP Swab     SARS-CoV-2 by PCR NotDetected    HOLD BLOOD BANK SPECIMEN (NOT TESTED)    Collection Time: 21  8:13 AM   Result Value Ref Range    Holding Tube - Bb DONE    CBC WITH DIFFERENTIAL    Collection Time: 21  8:13 AM   Result Value Ref Range    WBC 6.9 4.8 - 10.8 K/uL    RBC 3.51 (L) 4.20 - 5.40 M/uL    Hemoglobin 11.1 (L) 12.0 - 16.0 g/dL    Hematocrit 32.7 (L) 37.0 - 47.0 %    MCV 93.2 81.4 - 97.8 fL    MCH 31.6 27.0 - 33.0 pg    MCHC 33.9 33.6 - 35.0 g/dL    RDW 46.6 35.9 - 50.0 fL    Platelet Count 171 164 - 446 K/uL    MPV 10.1 9.0 - 12.9 fL    Neutrophils-Polys 68.40 44.00 - 72.00 %    Lymphocytes 21.50 (L) 22.00 - 41.00 %    Monocytes 8.50 0.00 - 13.40 %    Eosinophils 0.60 0.00 - 6.90 %    Basophils 0.40 0.00 - 1.80 %    Immature Granulocytes 0.60 0.00 - 0.90 %    Nucleated RBC 0.00 /100 WBC    Neutrophils (Absolute) 4.73 2.00 - 7.15 K/uL    Lymphs (Absolute) 1.49 1.00 - 4.80 K/uL    Monos (Absolute) 0.59 0.00 - 0.85 K/uL    Eos (Absolute) 0.04 0.00 - 0.51 K/uL    Baso (Absolute) 0.03 0.00 - 0.12 K/uL    Immature Granulocytes (abs) 0.04 0.00 - 0.11 K/uL    NRBC (Absolute) 0.00 K/uL        Assessment:   Brenda Westbrook at 39w1d  Labor status: Not in labor.    Obstetrical history significant for   Patient Active Problem List    Diagnosis Date Noted   • High-risk pregnancy in third trimester 2021     Priority: High   • Hx of  section x2 2018     Priority: High   • Urinary tract infection in mother during third trimester of pregnancy 2021   • S/P  section 2018   • Endometriosis determined by laparoscopy 2018   • Biological false positive RPR test 2018   • Intractable migraine without aura and without status migrainosus 2017   • Asthma, exercise induced    .      Plan:   Admit to L&D for a scheduled repeat    GBS negative  Fetal monitoring/toco  Ancef prior to surgery   Patient consent   Place epidural prior to  surgery       Doug Arana MD   PGY-1 FM Resident   [facility]

## 2021-05-09 NOTE — ANESTHESIA TIME REPORT
Anesthesia Start and Stop Event Times     Date Time Event    2021 0902 Ready for Procedure     0943 Anesthesia Start     1112 Anesthesia Stop        Responsible Staff  21    Name Role Begin End    Eldon Friedman M.D. Anesth 0943 1112        Preop Diagnosis (Free Text):  Pre-op Diagnosis     PREVIOUS  SECTION, PERMANENT STERILIZATION  39+1 WEEKS        Preop Diagnosis (Codes):    Post op Diagnosis  Pregnancy      Premium Reason  E. Weekend    Comments:

## 2021-05-10 ENCOUNTER — APPOINTMENT (OUTPATIENT)
Dept: RADIOLOGY | Facility: MEDICAL CENTER | Age: 26
End: 2021-05-10
Attending: NURSE PRACTITIONER
Payer: COMMERCIAL

## 2021-05-10 LAB
ERYTHROCYTE [DISTWIDTH] IN BLOOD BY AUTOMATED COUNT: 48 FL (ref 35.9–50)
HCT VFR BLD AUTO: 26 % (ref 37–47)
HGB BLD-MCNC: 8.6 G/DL (ref 12–16)
MCH RBC QN AUTO: 31.2 PG (ref 27–33)
MCHC RBC AUTO-ENTMCNC: 32.3 G/DL (ref 33.6–35)
MCV RBC AUTO: 96.4 FL (ref 81.4–97.8)
PATHOLOGY CONSULT NOTE: NORMAL
PLATELET # BLD AUTO: 149 K/UL (ref 164–446)
PMV BLD AUTO: 10 FL (ref 9–12.9)
RBC # BLD AUTO: 2.76 M/UL (ref 4.2–5.4)
WBC # BLD AUTO: 6.2 K/UL (ref 4.8–10.8)

## 2021-05-10 PROCEDURE — 85027 COMPLETE CBC AUTOMATED: CPT

## 2021-05-10 PROCEDURE — 770002 HCHG ROOM/CARE - OB PRIVATE (112)

## 2021-05-10 PROCEDURE — 700102 HCHG RX REV CODE 250 W/ 637 OVERRIDE(OP): Performed by: OBSTETRICS & GYNECOLOGY

## 2021-05-10 PROCEDURE — A9270 NON-COVERED ITEM OR SERVICE: HCPCS | Performed by: OBSTETRICS & GYNECOLOGY

## 2021-05-10 PROCEDURE — A9270 NON-COVERED ITEM OR SERVICE: HCPCS | Performed by: ANESTHESIOLOGY

## 2021-05-10 PROCEDURE — 93971 EXTREMITY STUDY: CPT | Mod: LT

## 2021-05-10 PROCEDURE — 36415 COLL VENOUS BLD VENIPUNCTURE: CPT

## 2021-05-10 PROCEDURE — 700111 HCHG RX REV CODE 636 W/ 250 OVERRIDE (IP): Performed by: ANESTHESIOLOGY

## 2021-05-10 PROCEDURE — 700102 HCHG RX REV CODE 250 W/ 637 OVERRIDE(OP): Performed by: ANESTHESIOLOGY

## 2021-05-10 RX ORDER — MISOPROSTOL 200 UG/1
800 TABLET ORAL
Status: DISCONTINUED | OUTPATIENT
Start: 2021-05-10 | End: 2021-05-13 | Stop reason: HOSPADM

## 2021-05-10 RX ORDER — SODIUM CHLORIDE, SODIUM LACTATE, POTASSIUM CHLORIDE, CALCIUM CHLORIDE 600; 310; 30; 20 MG/100ML; MG/100ML; MG/100ML; MG/100ML
INJECTION, SOLUTION INTRAVENOUS CONTINUOUS
Status: DISCONTINUED | OUTPATIENT
Start: 2021-05-10 | End: 2021-05-13 | Stop reason: HOSPADM

## 2021-05-10 RX ADMIN — OXYCODONE 5 MG: 5 TABLET ORAL at 08:19

## 2021-05-10 RX ADMIN — DOCUSATE SODIUM 100 MG: 100 CAPSULE, LIQUID FILLED ORAL at 08:19

## 2021-05-10 RX ADMIN — IBUPROFEN 600 MG: 600 TABLET ORAL at 12:33

## 2021-05-10 RX ADMIN — IBUPROFEN 600 MG: 600 TABLET ORAL at 18:27

## 2021-05-10 RX ADMIN — ACETAMINOPHEN 1000 MG: 500 TABLET ORAL at 02:39

## 2021-05-10 RX ADMIN — OXYCODONE 10 MG: 5 TABLET ORAL at 17:21

## 2021-05-10 RX ADMIN — OXYCODONE 10 MG: 5 TABLET ORAL at 21:34

## 2021-05-10 RX ADMIN — OXYCODONE 10 MG: 5 TABLET ORAL at 12:32

## 2021-05-10 RX ADMIN — KETOROLAC TROMETHAMINE 30 MG: 30 INJECTION, SOLUTION INTRAMUSCULAR; INTRAVENOUS at 00:59

## 2021-05-10 RX ADMIN — ACETAMINOPHEN 1000 MG: 500 TABLET ORAL at 08:23

## 2021-05-10 RX ADMIN — VITAMIN A, VITAMIN C, VITAMIN D, VITAMIN E, THIAMINE, RIBOFLAVIN, NIACIN, VITAMIN B6, FOLIC ACID, VITAMIN B12, CALCIUM, IRON, ZINC, COPPER 1 TABLET: 4000; 120; 400; 22; 1.84; 3; 20; 10; 1; 12; 200; 27; 25; 2 TABLET ORAL at 08:22

## 2021-05-10 RX ADMIN — DOCUSATE SODIUM 100 MG: 100 CAPSULE, LIQUID FILLED ORAL at 21:35

## 2021-05-10 RX ADMIN — OXYCODONE 5 MG: 5 TABLET ORAL at 04:20

## 2021-05-10 RX ADMIN — ACETAMINOPHEN 325 MG: 325 TABLET, FILM COATED ORAL at 21:34

## 2021-05-10 RX ADMIN — KETOROLAC TROMETHAMINE 30 MG: 30 INJECTION, SOLUTION INTRAMUSCULAR; INTRAVENOUS at 06:48

## 2021-05-10 ASSESSMENT — EDINBURGH POSTNATAL DEPRESSION SCALE (EPDS)
I HAVE LOOKED FORWARD WITH ENJOYMENT TO THINGS: AS MUCH AS I EVER DID
I HAVE BEEN ANXIOUS OR WORRIED FOR NO GOOD REASON: HARDLY EVER
I HAVE BEEN SO UNHAPPY THAT I HAVE HAD DIFFICULTY SLEEPING: NOT AT ALL
THE THOUGHT OF HARMING MYSELF HAS OCCURRED TO ME: NEVER
I HAVE FELT SCARED OR PANICKY FOR NO GOOD REASON: NO, NOT AT ALL
I HAVE BEEN ABLE TO LAUGH AND SEE THE FUNNY SIDE OF THINGS: AS MUCH AS I ALWAYS COULD
I HAVE BEEN SO UNHAPPY THAT I HAVE BEEN CRYING: NO, NEVER
I HAVE BLAMED MYSELF UNNECESSARILY WHEN THINGS WENT WRONG: NOT VERY OFTEN
THINGS HAVE BEEN GETTING ON TOP OF ME: NO, I HAVE BEEN COPING AS WELL AS EVER
I HAVE FELT SAD OR MISERABLE: NO, NOT AT ALL

## 2021-05-10 ASSESSMENT — PAIN DESCRIPTION - PAIN TYPE
TYPE: SURGICAL PAIN
TYPE: ACUTE PAIN
TYPE: SURGICAL PAIN

## 2021-05-10 NOTE — LACTATION NOTE
This note was copied from a baby's chart.  MOB has Home [facility], pump paperwork given for insurance to cover rental fee of pump.

## 2021-05-10 NOTE — CARE PLAN
Problem: Safety  Goal: Will remain free from falls  Outcome: PROGRESSING AS EXPECTED  Note: Patient ambulated up to bathroom with standby assist. Patient reminded to utilize call light throughout night for any needs.      Problem: Potential knowledge deficit related to lack of understanding of self and  care  Goal: Patient will demonstrate ability to care for self and infant  Outcome: PROGRESSING AS EXPECTED  Note: Patient is pumping and visited infant in NICU. Patient demonstrated ability to care for self and infant.

## 2021-05-10 NOTE — DISCHARGE PLANNING
Discharge Planning Assessment Post Partum     Reason for Referral:NICU  Address: [address] [address]  Type of Living Situation: House with FOB 4 and 2 year old boys (same FOB).     Mom Diagnosis: Pregnancy   Baby Diagnosis: NICU  Primary Language: English      Name of Baby: Malcolm Westbrook     Mother of the Baby: Brenda ([phone])  Father of the Baby: Doni Westbrook  Involved in baby’s care? Yes  Contact Information: [phone]     Prenatal Care: Yes, [facility]  Mom's PCP: Dr. Asencio  PCP for new baby: Dr. Reyes      Support System: Yes - MOB and FOB's friends and family  Coping/Bonding between mother & baby: Yes  Source of Feeding: Breast Pumping     Supplies for Infant: Prepared     Mom's Insurance: [employer]     Baby Covered on Insurance: MOB's insurance   Mother Employed/School: Yes, both MOB and FOB are employed  Other children in the home/names & ages: Yes, 4 and 2 year old boys (same FOB).     Financial Hardship/Income: Denies  Mom's Mental status: Alert and Oriented x 4  Services used prior to admit: Denies     CPS History: Denies  Psychiatric History: Yes, MOB reported she has a HX of both Anxiety and Depression.  MOB reported she was once taking Zoloft, but no longer needs it.  MOB also reported she has a therapist that she will see when needed. LSW explained the difference between PPD and baby blues and encouraged MOB to reach out if she is experiencing any heightened anxiety or depression.      Domestic Violence History: Denies  Drug/ETOH History: Denies     Resources Provided: Postpartum Resources, Behavioral Health Resouces  Referrals Made: None      Clearance for Discharge: Baby is cleared to discharge home with MOB/FOB upon medical clearance.      Ongoing Plan: Continue to provide support and resources to family until dc

## 2021-05-10 NOTE — LACTATION NOTE
This note was copied from a baby's chart.  Follow-up visit, baby continues in NICU. MOB pumping regularly 8-10 times in 24 hours, yielding 7-8 ml of colostrum with each pump session today. MOB reports flange 28.5 is good fit. MOB has no questions today. Recommended renting HG pump through [employer] for home until milk supply becomes established, breast pump rental sheet given. Encouraged mother to call for any pumping or lactation needs or questions.

## 2021-05-10 NOTE — PROGRESS NOTES
1900-Received report from SLOAN Rodrigues. Assumed patient care. POC for evening discussed with patient. No additional needs at this time.    2030-Assessment complete. Fundus firm at umbilicus with light bleeding/clot noted. Patient reports increase pain rating at 6/10. Medication administration complete, see MAR. Duramorph checks in place until 0200. Patient denies any additional needs at this time.     2145-Patient ambulated to bathroom with hand held assist from RN and CNA. Patient tolerated well. Gait steady shuffle. Patient gown, violetta-pad, and underwear changed. Partial bed change complete. Assisted patient back to bed. Dura checks and SCD's on and activated. Patient reports increased itching. Request medication.     2300-Olvera removed. Patient tolerated removal well. Patient reminded of needing to void within six hours post olvera removal. Patient instructed to call RN once complete. Medication for itchiness administered. See MAR. RN escorted patient and FOB to NICU via wheelchair.     0000-Patient returned to room S338 from NICU with FOB. Deny any additional needs at this time.

## 2021-05-10 NOTE — CARE PLAN
Problem: Altered physiologic condition related to postoperative  delivery  Goal: Patient physiologically stable as evidenced by normal lochia, palpable uterine involution and vital signs within normal limits  Outcome: PROGRESSING AS EXPECTED  Note: VSS. Fundus firm. Lochia light.     Problem: Alteration in comfort related to surgical incision and/or after birth pains  Goal: Patient verbalizes acceptable pain level  Outcome: PROGRESSING AS EXPECTED  Note: Pt pain managed with PRN and scheduled pain meds per MAR.

## 2021-05-10 NOTE — PROGRESS NOTES
POSTOPERATIVE  SECTION PROGRESS NOTE;        PATIENT ID:  NAME:  Brenda Westbrook  MRN:               [id number]  YOB: 1995     26 y.o. female  at 39w2d POD#1 s/p repeat  section, bilateral salpingectomy      Subjective: Doing well tolerating regular diet ambulating    Objective:    Vitals:    21 2300 05/10/21 0000 05/10/21 0100 05/10/21 0200   BP:    103/67   Pulse: 77 72 90 64   Resp: 16 18 16 18   Temp:    37.1 °C (98.7 °F)   TempSrc:    Temporal   SpO2: 95% 98% 93% 96%   Weight:       Height:         General: No acute distress, resting comfortably in bed.  HEENT: normocephalic, nontraumatic, PERRLA, EOMI  Cardiovascular: Heart RRR with no murmurs, rubs or gallops. Distal Pulses 2+  Respiratory: symmetric chest expansion, lungs CTA bilaterally with no wheezes rales or rhonci  Abdomen: soft, mildly tender around incision which is clean, dry and intact, fundus firm, +BS  Genitourinary: lochia light, denies excessive vaginal bleeding  Musculoskeletal: strength 5/5 in four extremities  Neuro: non focal with no numbness, tingling or changes in sensation      Recent Labs     21  0813 05/10/21  0139   WBC 6.9 6.2   RBC 3.51* 2.76*   HEMOGLOBIN 11.1* 8.6*   HEMATOCRIT 32.7* 26.0*   MCV 93.2 96.4   MCH 31.6 31.2   RDW 46.6 48.0   PLATELETCT 171 149*   MPV 10.1 10.0   NEUTSPOLYS 68.40  --    LYMPHOCYTES 21.50*  --    MONOCYTES 8.50  --    EOSINOPHILS 0.60  --    BASOPHILS 0.40  --      No results for input(s): SODIUM, POTASSIUM, CHLORIDE, CO2, GLUCOSE, BUN, CPKTOTAL in the last 72 hours.    Current Meds:   Current Facility-Administered Medications   Medication Dose Frequency Provider Last Rate Last Admin   • lactated ringers infusion   Continuous Eldon Friedman M.D.       • lactated ringers (LR) infusion   Continuous Jorge Alvarenga M.D.       • oxyCODONE immediate-release (ROXICODONE) tablet 5 mg  5 mg Q4HRS PRN Eldon Friedman M.D.   5 mg at 05/10/21 2880   • oxyCODONE  immediate-release (ROXICODONE) tablet 10 mg  10 mg Q4HRS PRN Eldon Friedman M.D.   10 mg at 05/09/21 2049   • acetaminophen (TYLENOL) tablet 1,000 mg  1,000 mg Q6HR Eldon Friedman M.D.   1,000 mg at 05/10/21 0239   • ketorolac (TORADOL) injection 30 mg  30 mg Q6HR Eldon Friedman M.D.   30 mg at 05/10/21 0059   • HYDROmorphone (Dilaudid) injection 0.2 mg  0.2 mg Q2HRS PRN Eldon Friedman M.D.       • HYDROmorphone (Dilaudid) injection 0.4 mg  0.4 mg Q2HRS PRN Eldon Friedman M.D.       • ePHEDrine injection 10 mg  10 mg Q5 MIN PRN Eldon Friedman M.D.       • ondansetron (ZOFRAN) syringe/vial injection 4 mg  4 mg Q6HRS PRN Eldon Friedman M.D.       • diphenhydrAMINE (BENADRYL) injection 12.5 mg  12.5 mg Q6HRS PRN Eldon Friedman M.D.       • diphenhydrAMINE (BENADRYL) injection 12.5 mg  12.5 mg Q6HRS PRN Eldon Friedman M.D.        Or   • diphenhydrAMINE (BENADRYL) injection 25 mg  25 mg Q6HRS PRN Eldon Friedman M.D.        Or   • naloxone (NARCAN) 0.4 mg in NS 1,000 mL infusion  0.4 mg PRN Eldon Friedman M.D.       • LR infusion   PRN Jorge Alvarenga M.D.       • PRN oxytocin (PITOCIN) (20 Units/1000 mL) PRN for excessive uterine bleeding - See Admin Instr  125-999 mL/hr Once PRN Jorge Alvarenga M.D.       • miSOPROStol (CYTOTEC) tablet 600 mcg  600 mcg Once PRN Jorge Alvarenga M.D.       • methylergonovine (METHERGINE) injection 0.2 mg  0.2 mg Once PRN Jorge Alvarenga M.D.       • docusate sodium (COLACE) capsule 100 mg  100 mg BID PRN Jorge Alvarenga M.D.       • bisacodyl (DULCOLAX) suppository 10 mg  10 mg PRN Jorge Alvarenga M.D.       • simethicone (MYLICON) chewable tab 80 mg  80 mg 4X/DAY PRN Jorge Alvarenga M.D.   80 mg at 05/09/21 2049   • prenatal plus vitamin (STUARTNATAL 1+1) 27-1 MG tablet 1 tablet  1 tablet Daily-0800 Jorge Alvarenga M.D.   Stopped at 05/09/21 1430   • ibuprofen (MOTRIN) tablet 600 mg  600 mg Q6HRS PRN Jorge Alvarenga M.D.       • acetaminophen (Tylenol)  tablet 325 mg  325 mg Q4HRS PRN Jorge Alvarenga M.D.       • oxyCODONE-acetaminophen (PERCOCET) 5-325 MG per tablet 1 tablet  1 tablet Q4HRS PRN Jorge Alvarenga M.D.       • oxyCODONE immediate-release (ROXICODONE) tablet 10 mg  10 mg Q4HRS PRN Jorge Alvarenga M.D.       • morphine (pf) 4 mg/mL injection 4 mg  4 mg Q3HRS PRN Jorge Alvarenga M.D.       • ondansetron (ZOFRAN) syringe/vial injection 4 mg  4 mg Q6HRS PRN Jorge Alvarenga M.D.        Or   • ondansetron (ZOFRAN ODT) dispertab 4 mg  4 mg Q6HRS PRN Jorge Alvarenga M.D.       • diphenhydrAMINE (BENADRYL) tablet/capsule 25 mg  25 mg Q6HRS PRN Jorge Alvarenga M.D.   25 mg at 21 2252    Or   • diphenhydrAMINE (BENADRYL) injection 25 mg  25 mg Q6HRS PRNYA Alvarenga M.D.       Last reviewed on 2021 11:31 AM by Constance Sandoval R.N.          Assessment:  26 y.o. female  at 39w2d POD#1 s/p repeat  section, bilateral salpingectomy-stable    Plan:   1. Advance diet, ABIMAEL Alexander  2. Discharge to home POD 3 if stable    Jorge Alvarenga MD

## 2021-05-10 NOTE — PROGRESS NOTES
Assessment complete. Fundus firm, lochia light. VSS. Tolerating diet. Voiding without difficulty. Incision dressing CDI. Pain controlled with prn medications per mar. Will offer pain medications as they become available. FOB at bedside, bonding with pt. POC discussed with pt. Encouraged to call with needs. Call light in place.

## 2021-05-11 PROCEDURE — 700102 HCHG RX REV CODE 250 W/ 637 OVERRIDE(OP): Performed by: OBSTETRICS & GYNECOLOGY

## 2021-05-11 PROCEDURE — 770002 HCHG ROOM/CARE - OB PRIVATE (112)

## 2021-05-11 PROCEDURE — 700111 HCHG RX REV CODE 636 W/ 250 OVERRIDE (IP): Performed by: OBSTETRICS & GYNECOLOGY

## 2021-05-11 PROCEDURE — A9270 NON-COVERED ITEM OR SERVICE: HCPCS | Performed by: OBSTETRICS & GYNECOLOGY

## 2021-05-11 RX ADMIN — ACETAMINOPHEN 325 MG: 325 TABLET, FILM COATED ORAL at 22:04

## 2021-05-11 RX ADMIN — IBUPROFEN 600 MG: 600 TABLET ORAL at 06:36

## 2021-05-11 RX ADMIN — DOCUSATE SODIUM 100 MG: 100 CAPSULE, LIQUID FILLED ORAL at 15:48

## 2021-05-11 RX ADMIN — OXYCODONE 10 MG: 5 TABLET ORAL at 15:48

## 2021-05-11 RX ADMIN — ONDANSETRON 4 MG: 4 TABLET, ORALLY DISINTEGRATING ORAL at 19:43

## 2021-05-11 RX ADMIN — IBUPROFEN 600 MG: 600 TABLET ORAL at 00:27

## 2021-05-11 RX ADMIN — OXYCODONE 10 MG: 5 TABLET ORAL at 11:49

## 2021-05-11 RX ADMIN — IBUPROFEN 600 MG: 600 TABLET ORAL at 19:43

## 2021-05-11 RX ADMIN — OXYCODONE 10 MG: 5 TABLET ORAL at 22:04

## 2021-05-11 RX ADMIN — OXYCODONE 10 MG: 5 TABLET ORAL at 02:25

## 2021-05-11 RX ADMIN — IBUPROFEN 600 MG: 600 TABLET ORAL at 12:59

## 2021-05-11 RX ADMIN — VITAMIN A, VITAMIN C, VITAMIN D, VITAMIN E, THIAMINE, RIBOFLAVIN, NIACIN, VITAMIN B6, FOLIC ACID, VITAMIN B12, CALCIUM, IRON, ZINC, COPPER 1 TABLET: 4000; 120; 400; 22; 1.84; 3; 20; 10; 1; 12; 200; 27; 25; 2 TABLET ORAL at 07:20

## 2021-05-11 RX ADMIN — OXYCODONE 10 MG: 5 TABLET ORAL at 06:36

## 2021-05-11 ASSESSMENT — PAIN DESCRIPTION - PAIN TYPE
TYPE: SURGICAL PAIN
TYPE: ACUTE PAIN
TYPE: SURGICAL PAIN
TYPE: ACUTE PAIN
TYPE: SURGICAL PAIN

## 2021-05-11 NOTE — PROGRESS NOTES
"MD on call notified of pt c/o intermittent numbness and tingling in her left leg. Pt states that she has had this happen about 5 times today and lasting no longer than 15 minutes. Pt states that it is her entire leg. When asked if pt has any pain in her calf area, pt states that \"it's sore, like as if I were excercising.\" Pt denies sharp pain in calf area. Calf area assessed; No redness or warmth in calf or leg. Pt does state swelling in both feet equally; swelling noted. MD states he will be at bedside to assess soon.  "

## 2021-05-11 NOTE — PROGRESS NOTES
Post Partum Progress Note    Name:   Brenda Westbrook   Date/Time:  2021 - 6:29 AM  Chief Admitting Dx:  Labor and delivery, indication for care [O75.9]  Pregnant and not yet delivered in third trimester [Z34.93]  Delivery Type:   for repeat  Post-Op/Post Partum Days #:  2    Subjective:  Abdominal pain: Yes- related to incision  Ambulating:   yes  Tolerating liquids:  yes  Tolerating food:  yes common adult  Flatus:   yes  BM:    no  Bleeding:   without any bleeding  Voiding:   yes  Dizziness:   no  Feeding:   Pumping for infant in ICN    Vitals:    05/10/21 0600 05/10/21 1000 05/10/21 1730 21 0600   BP: 107/60 106/68 109/64 107/64   Pulse: 90 91 83 84   Resp: 19 20 18 14   Temp: 36.3 °C (97.3 °F) 36.5 °C (97.7 °F) 36.5 °C (97.7 °F) 36.6 °C (97.8 °F)   TempSrc: Temporal Temporal Temporal Temporal   SpO2: 96% 95% 97% 94%   Weight:       Height:         Was called by RN at beginning of shift due to left leg numbness. She is still ambulating, but is fearful that she may have a DVT. Exam is negative for DVT, but LE US was done and was also negative. Likely related to residual anesthesia. Encouraged increased ambulation.    Exam:  Breast: Tenderness no, Engorged no and Lactating no  Abdomen: Abdomen soft, non-tender. BS normal. No masses,  No organomegaly  Fundal Tenderness:  no  Fundus Firm: yes  Incision: healing well with good reapproximation  Below umbilicus: yes  Perineum: perineum intact  Lochia: mild  Extremities: Normal, 1+ edema extremities, peripheral pulses and reflexes normal, no edema, redness or tenderness in the calves or thighs, Homans sign is negative, no sign of DVT, feet normal, good pulses, normal color, temperature and sensation    Meds:  Current Facility-Administered Medications   Medication Dose   • LR infusion     • oxytocin (PITOCIN) infusion (for postpartum)  2,000 mL/hr    Followed by   • oxytocin (PITOCIN) infusion (for postpartum)   mL/hr   • miSOPROStol  (CYTOTEC) tablet 800 mcg  800 mcg   • lactated ringers infusion     • lactated ringers (LR) infusion     • LR infusion     • PRN oxytocin (PITOCIN) (20 Units/1000 mL) PRN for excessive uterine bleeding - See Admin Instr  125-999 mL/hr   • miSOPROStol (CYTOTEC) tablet 600 mcg  600 mcg   • methylergonovine (METHERGINE) injection 0.2 mg  0.2 mg   • docusate sodium (COLACE) capsule 100 mg  100 mg   • bisacodyl (DULCOLAX) suppository 10 mg  10 mg   • simethicone (MYLICON) chewable tab 80 mg  80 mg   • prenatal plus vitamin (STUARTNATAL 1+1) 27-1 MG tablet 1 tablet  1 tablet   • ibuprofen (MOTRIN) tablet 600 mg  600 mg   • acetaminophen (Tylenol) tablet 325 mg  325 mg   • oxyCODONE-acetaminophen (PERCOCET) 5-325 MG per tablet 1 tablet  1 tablet   • oxyCODONE immediate-release (ROXICODONE) tablet 10 mg  10 mg   • morphine (pf) 4 mg/mL injection 4 mg  4 mg   • ondansetron (ZOFRAN) syringe/vial injection 4 mg  4 mg    Or   • ondansetron (ZOFRAN ODT) dispertab 4 mg  4 mg   • diphenhydrAMINE (BENADRYL) tablet/capsule 25 mg  25 mg    Or   • diphenhydrAMINE (BENADRYL) injection 25 mg  25 mg       Labs:   Recent Labs     21  0813 05/10/21  0139   WBC 6.9 6.2   RBC 3.51* 2.76*   HEMOGLOBIN 11.1* 8.6*   HEMATOCRIT 32.7* 26.0*   MCV 93.2 96.4   MCH 31.6 31.2   MCHC 33.9 32.3*   RDW 46.6 48.0   PLATELETCT 171 149*   MPV 10.1 10.0       Assessment:  Chief Admitting Dx:  Labor and delivery, indication for care [O75.9]  Pregnant and not yet delivered in third trimester [Z34.93]  Delivery Type:   for repeat  Tubal Ligation:  yes    Plan:  Continue routine post partum care.  Increase ambulation, with assistance as necessary  Pain management  Lactation support for infant in ICN  Anticipate d/c POD#3 or #4    Megan Caballero [email or web address].

## 2021-05-11 NOTE — LACTATION NOTE
This note was copied from a baby's chart.  Follow-up visit, MOB continues to pump regularly, yield 15 ml with each pump session. FOB picked-up HG pump through T[facility] today, ready for discharge. MOB denies any questions at this time.

## 2021-05-11 NOTE — PROGRESS NOTES
Assessment done. Vital signs stable. Patient voiding without difficulty, claims to be passing flatus. Fundus firm at umbilicus with light lochia. Clips over low abdominal incision clean, dry, and intact. No redness, swelling, or drainage noted.  Patient ambulating with steady gait. Patient claims to have good pain relief with p.o meds. Pumping for infant in ICN. POC discussed. Pt claims she will call for any needs or medications

## 2021-05-11 NOTE — CARE PLAN
Problem: Altered physiologic condition related to postoperative  delivery  Goal: Patient physiologically stable as evidenced by normal lochia, palpable uterine involution and vital signs within normal limits  Outcome: PROGRESSING AS EXPECTED  Note: Assessment normal, vital signs remain stable. Reviewed normal lochia changes with patient.     Problem: Alteration in comfort related to surgical incision and/or after birth pains  Goal: Patient verbalizes acceptable pain level  Outcome: PROGRESSING AS EXPECTED  Note: Patient makes her needs known and prefers to be offered pain medications as they are available.     Problem: Potential knowledge deficit related to lack of understanding of self and  care  Goal: Patient will verbalize understanding of self and infant care  Outcome: PROGRESSING AS EXPECTED  Note: Infant is in NICU, patient and FOB visit NICU for care times as tolerated.

## 2021-05-11 NOTE — PROGRESS NOTES
See ultrasound of LLE related to patient complaints of numbness and tingling. CNM Megan Caballero notified of patient's symptoms.

## 2021-05-12 PROCEDURE — 770002 HCHG ROOM/CARE - OB PRIVATE (112)

## 2021-05-12 PROCEDURE — A9270 NON-COVERED ITEM OR SERVICE: HCPCS | Performed by: NURSE PRACTITIONER

## 2021-05-12 PROCEDURE — A9270 NON-COVERED ITEM OR SERVICE: HCPCS | Performed by: OBSTETRICS & GYNECOLOGY

## 2021-05-12 PROCEDURE — 700102 HCHG RX REV CODE 250 W/ 637 OVERRIDE(OP): Performed by: OBSTETRICS & GYNECOLOGY

## 2021-05-12 PROCEDURE — 700102 HCHG RX REV CODE 250 W/ 637 OVERRIDE(OP): Performed by: NURSE PRACTITIONER

## 2021-05-12 RX ORDER — FERROUS SULFATE 325(65) MG
325 TABLET ORAL 2 TIMES DAILY WITH MEALS
Status: DISCONTINUED | OUTPATIENT
Start: 2021-05-12 | End: 2021-05-13 | Stop reason: HOSPADM

## 2021-05-12 RX ORDER — BREAST PUMP
1 EACH MISCELLANEOUS PRN
Qty: 1 EACH | Refills: 0 | Status: SHIPPED | OUTPATIENT
Start: 2021-05-12 | End: 2022-04-01

## 2021-05-12 RX ADMIN — DOCUSATE SODIUM 100 MG: 100 CAPSULE, LIQUID FILLED ORAL at 20:39

## 2021-05-12 RX ADMIN — IBUPROFEN 600 MG: 600 TABLET ORAL at 02:26

## 2021-05-12 RX ADMIN — IBUPROFEN 600 MG: 600 TABLET ORAL at 20:39

## 2021-05-12 RX ADMIN — OXYCODONE AND ACETAMINOPHEN 1 TABLET: 5; 325 TABLET ORAL at 06:47

## 2021-05-12 RX ADMIN — IBUPROFEN 600 MG: 600 TABLET ORAL at 08:40

## 2021-05-12 RX ADMIN — IBUPROFEN 600 MG: 600 TABLET ORAL at 14:39

## 2021-05-12 RX ADMIN — FERROUS SULFATE TAB 325 MG (65 MG ELEMENTAL FE) 325 MG: 325 (65 FE) TAB at 16:32

## 2021-05-12 RX ADMIN — VITAMIN A, VITAMIN C, VITAMIN D, VITAMIN E, THIAMINE, RIBOFLAVIN, NIACIN, VITAMIN B6, FOLIC ACID, VITAMIN B12, CALCIUM, IRON, ZINC, COPPER 1 TABLET: 4000; 120; 400; 22; 1.84; 3; 20; 10; 1; 12; 200; 27; 25; 2 TABLET ORAL at 08:40

## 2021-05-12 RX ADMIN — OXYCODONE 10 MG: 5 TABLET ORAL at 20:38

## 2021-05-12 RX ADMIN — OXYCODONE AND ACETAMINOPHEN 1 TABLET: 5; 325 TABLET ORAL at 16:32

## 2021-05-12 RX ADMIN — OXYCODONE 10 MG: 5 TABLET ORAL at 02:26

## 2021-05-12 RX ADMIN — FERROUS SULFATE TAB 325 MG (65 MG ELEMENTAL FE) 325 MG: 325 (65 FE) TAB at 08:40

## 2021-05-12 RX ADMIN — OXYCODONE AND ACETAMINOPHEN 1 TABLET: 5; 325 TABLET ORAL at 12:06

## 2021-05-12 ASSESSMENT — PAIN DESCRIPTION - PAIN TYPE
TYPE: ACUTE PAIN

## 2021-05-12 NOTE — CARE PLAN
Problem: Potential for postpartum infection related to surgical incision, compromised uterine condition, urinary tract or respiratory compromise  Goal: Patient will be afebrile and free from signs and symptoms of infection  Outcome: Progressing  Note: VSS. No s/s of infection      Problem: Alteration in comfort related to surgical incision and/or after birth pains  Goal: Patient verbalizes acceptable pain level  Outcome: Progressing  Note: Denies pain at this time. Encourages non pain med interventions such as ice packs    The patient is Stable - Low risk of patient condition declining or worsening         Summary of progress made towards problems/goals:  progressing toward goals

## 2021-05-12 NOTE — CARE PLAN
Problem: Altered physiologic condition related to postoperative  delivery  Goal: Patient physiologically stable as evidenced by normal lochia, palpable uterine involution and vital signs within normal limits  Outcome: PROGRESSING AS EXPECTED  Note: Assessment normal and vital signs stable. Reviewed normal lochia changes with patient.     Problem: Alteration in comfort related to surgical incision and/or after birth pains  Goal: Patient is able to ambulate, care for self and infant with acceptable pain level  Outcome: PROGRESSING AS EXPECTED  Note: Patient continues to require medication for pain management and prefers meds be offered as available. She is tolerating increased ambulation and is visiting infant in the NICU as tolerated. Patient makes her needs known and is performing all ADLs independently.

## 2021-05-12 NOTE — PROGRESS NOTES
Post Partum Progress Note    Name:   Brenda Westbrook   Date/Time:  2021 - 6:50 AM  Chief Admitting Dx:  Labor and delivery, indication for care [O75.9]  Pregnant and not yet delivered in third trimester [Z34.93]  Delivery Type:   for repeat  Post-Op/Post Partum Days #:  3    Subjective:  Abdominal pain: Yes   Ambulating:   yes  Tolerating liquids:  yes  Tolerating food:  yes common adult  Flatus:   yes  BM:    no  Bleeding:   with a small amount of bleeding  Voiding:   yes  Dizziness:   yes  Feeding:   Breast pumping for baby in NICU    Vitals:    05/10/21 1000 05/10/21 1730 21 0600 21 0552   BP: 106/68 109/64 107/64 118/70   Pulse: 91 83 84 79   Resp: 20 18 14 18   Temp: 36.5 °C (97.7 °F) 36.5 °C (97.7 °F) 36.6 °C (97.8 °F) 36.8 °C (98.2 °F)   TempSrc: Temporal Temporal Temporal Temporal   SpO2: 95% 97% 94% 98%   Weight:       Height:           Exam:  Breast: Lactating yes  Abdomen: Abdomen soft, non-tender. BS normal. No masses,  No organomegaly  Fundal Tenderness:  no  Fundus Firm: yes  Incision: dry and intact  Below umbilicus: yes  Perineum: perineum intact  Lochia: mild  Extremities: Normal, no cyanosis, clubbing extremities, peripheral pulses and reflexes normal    Meds:  Current Facility-Administered Medications   Medication Dose   • LR infusion     • oxytocin (PITOCIN) infusion (for postpartum)   mL/hr   • miSOPROStol (CYTOTEC) tablet 800 mcg  800 mcg   • lactated ringers (LR) infusion     • LR infusion     • PRN oxytocin (PITOCIN) (20 Units/1000 mL) PRN for excessive uterine bleeding - See Admin Instr  125-999 mL/hr   • miSOPROStol (CYTOTEC) tablet 600 mcg  600 mcg   • methylergonovine (METHERGINE) injection 0.2 mg  0.2 mg   • docusate sodium (COLACE) capsule 100 mg  100 mg   • bisacodyl (DULCOLAX) suppository 10 mg  10 mg   • simethicone (MYLICON) chewable tab 80 mg  80 mg   • prenatal plus vitamin (STUARTNATAL 1+1) 27-1 MG tablet 1 tablet  1 tablet   • ibuprofen  (MOTRIN) tablet 600 mg  600 mg   • acetaminophen (Tylenol) tablet 325 mg  325 mg   • oxyCODONE-acetaminophen (PERCOCET) 5-325 MG per tablet 1 tablet  1 tablet   • oxyCODONE immediate-release (ROXICODONE) tablet 10 mg  10 mg   • morphine (pf) 4 mg/mL injection 4 mg  4 mg   • ondansetron (ZOFRAN) syringe/vial injection 4 mg  4 mg    Or   • ondansetron (ZOFRAN ODT) dispertab 4 mg  4 mg   • diphenhydrAMINE (BENADRYL) tablet/capsule 25 mg  25 mg    Or   • diphenhydrAMINE (BENADRYL) injection 25 mg  25 mg       Labs:   Recent Labs     21  0813 05/10/21  0139   WBC 6.9 6.2   RBC 3.51* 2.76*   HEMOGLOBIN 11.1* 8.6*   HEMATOCRIT 32.7* 26.0*   MCV 93.2 96.4   MCH 31.6 31.2   MCHC 33.9 32.3*   RDW 46.6 48.0   PLATELETCT 171 149*   MPV 10.1 10.0       Assessment:  Chief Admitting Dx:  Labor and delivery, indication for care [O75.9]  Pregnant and not yet delivered in third trimester [Z34.93]  Delivery Type:   for repeat  Tubal Ligation:  yes    Plan:  Continue routine post partum care.  Asx anemia - start iron BID.  Anticipate DC home on POD#4 due to infant in NICU.    AMANDA Boyer.

## 2021-05-12 NOTE — PROGRESS NOTES
0800 Assessment completed, fundus firm, lochia light, ABD incision with staples intact, POC reviewed, verbalized understanding. Denies pain at this time, will call if pain med intervention needed.

## 2021-05-12 NOTE — LACTATION NOTE
This note was copied from a baby's chart.  Evaluated breast pump use to include frequency, duration, suction and speed settings as well as flange fit. Larger flange provided for left breast and massaged to help alleviate engorgement, ice packs provided.

## 2021-05-13 ENCOUNTER — PHARMACY VISIT (OUTPATIENT)
Dept: PHARMACY | Facility: MEDICAL CENTER | Age: 26
End: 2021-05-13
Payer: COMMERCIAL

## 2021-05-13 VITALS
HEIGHT: 63 IN | DIASTOLIC BLOOD PRESSURE: 71 MMHG | SYSTOLIC BLOOD PRESSURE: 108 MMHG | BODY MASS INDEX: 27.46 KG/M2 | HEART RATE: 83 BPM | OXYGEN SATURATION: 95 % | WEIGHT: 155 LBS | TEMPERATURE: 98 F | RESPIRATION RATE: 16 BRPM

## 2021-05-13 PROBLEM — O09.93 HIGH-RISK PREGNANCY IN THIRD TRIMESTER: Status: RESOLVED | Noted: 2021-01-18 | Resolved: 2021-05-13

## 2021-05-13 PROCEDURE — A9270 NON-COVERED ITEM OR SERVICE: HCPCS | Performed by: NURSE PRACTITIONER

## 2021-05-13 PROCEDURE — 700102 HCHG RX REV CODE 250 W/ 637 OVERRIDE(OP): Performed by: OBSTETRICS & GYNECOLOGY

## 2021-05-13 PROCEDURE — A9270 NON-COVERED ITEM OR SERVICE: HCPCS | Performed by: OBSTETRICS & GYNECOLOGY

## 2021-05-13 PROCEDURE — RXMED WILLOW AMBULATORY MEDICATION CHARGE: Performed by: ADVANCED PRACTICE MIDWIFE

## 2021-05-13 PROCEDURE — 700102 HCHG RX REV CODE 250 W/ 637 OVERRIDE(OP): Performed by: NURSE PRACTITIONER

## 2021-05-13 RX ORDER — FERROUS SULFATE 325(65) MG
325 TABLET ORAL 2 TIMES DAILY WITH MEALS
Qty: 60 TABLET | Refills: 0 | Status: SHIPPED | OUTPATIENT
Start: 2021-05-13 | End: 2021-10-01

## 2021-05-13 RX ORDER — IBUPROFEN 600 MG/1
600 TABLET ORAL EVERY 6 HOURS PRN
Qty: 30 TABLET | Refills: 0 | Status: SHIPPED | OUTPATIENT
Start: 2021-05-13 | End: 2021-10-01

## 2021-05-13 RX ORDER — OXYCODONE HYDROCHLORIDE AND ACETAMINOPHEN 5; 325 MG/1; MG/1
1 TABLET ORAL EVERY 6 HOURS PRN
Qty: 20 TABLET | Refills: 0 | Status: SHIPPED | OUTPATIENT
Start: 2021-05-13 | End: 2021-05-20

## 2021-05-13 RX ADMIN — IBUPROFEN 600 MG: 600 TABLET ORAL at 02:01

## 2021-05-13 RX ADMIN — OXYCODONE AND ACETAMINOPHEN 1 TABLET: 5; 325 TABLET ORAL at 02:02

## 2021-05-13 RX ADMIN — VITAMIN A, VITAMIN C, VITAMIN D, VITAMIN E, THIAMINE, RIBOFLAVIN, NIACIN, VITAMIN B6, FOLIC ACID, VITAMIN B12, CALCIUM, IRON, ZINC, COPPER 1 TABLET: 4000; 120; 400; 22; 1.84; 3; 20; 10; 1; 12; 200; 27; 25; 2 TABLET ORAL at 08:27

## 2021-05-13 RX ADMIN — IBUPROFEN 600 MG: 600 TABLET ORAL at 10:14

## 2021-05-13 RX ADMIN — OXYCODONE AND ACETAMINOPHEN 1 TABLET: 5; 325 TABLET ORAL at 06:20

## 2021-05-13 RX ADMIN — FERROUS SULFATE TAB 325 MG (65 MG ELEMENTAL FE) 325 MG: 325 (65 FE) TAB at 08:27

## 2021-05-13 RX ADMIN — OXYCODONE AND ACETAMINOPHEN 1 TABLET: 5; 325 TABLET ORAL at 10:14

## 2021-05-13 NOTE — DISCHARGE SUMMARY
Discharge Summary:      Brenda Westbrook    Admit Date:   2021  Discharge Date:  2021     Admitting diagnosis:  Labor and delivery, indication for care [O75.9]  Pregnant and not yet delivered in third trimester [Z34.93]  Discharge Diagnosis: Status post  for repeat.  Pregnancy Complications: none  Tubal Ligation:  yes        History:  Past Medical History:   Diagnosis Date   • Asthma, exercise induced    • Bronchitis    • Chlamydia     Oct 2011   • Cold     02/6/15   • Endometriosis    • Head ache    • Indigestion    • Intractable migraine without aura and without status migrainosus 2017    Referral to neuro   • Other specified symptom associated with female genital organs     endometriosis   • Pain     pelvic   • Pain 12    stomach   • Psychiatric problem     depression   • Sleep disturbances    • Unspecified disorder of thyroid 2015    hyperactive in the past not taking any meds currently   • Urinary bladder disorder     frequent uti's     OB History    Para Term  AB Living   3 2 2 0 0 2   SAB TAB Ectopic Molar Multiple Live Births   0 0 0     2      # Outcome Date GA Lbr Bhupinder/2nd Weight Sex Delivery Anes PTL Lv   3 Current            2 Term 09/15/18 40w1d  2.545 kg (5 lb 9.8 oz) M CS-LTranv   MARYSOL   1 Term 16 41w0d  3.09 kg (6 lb 13 oz) M CS-LTranv Spinal N MARYSOL      Birth Comments: double nuchal cord, NRFS      Complications: Fetal Intolerance        Ciprofloxacin, Codeine, Doxycycline, and Keflex  Patient Active Problem List    Diagnosis Date Noted   • Urinary tract infection in mother during third trimester of pregnancy 2021   • S/P  section 2018   • Endometriosis determined by laparoscopy 2018   • Biological false positive RPR test 2018   • Hx of  section x2 2018   • Intractable migraine without aura and without status migrainosus 2017   • Asthma, exercise induced         Hospital Course:   26 y.o. ,  now para 3, was admitted with the above mentioned diagnosis, underwent Repeat  repeat,  for repeat. Patient postpartum course was unremarkable, with progressive advancement in diet , ambulation and toleration of oral analgesia. Patient without complaints today and desires discharge.      Vitals:    21 0600 21 0552 21 1750 21 0600   BP: 107/64 118/70 118/71 108/71   Pulse: 84 79 78 83   Resp: 14 18 16 16   Temp: 36.6 °C (97.8 °F) 36.8 °C (98.2 °F) 36.2 °C (97.1 °F) 36.7 °C (98 °F)   TempSrc: Temporal Temporal Temporal Temporal   SpO2: 94% 98% 97% 95%   Weight:       Height:           Current Facility-Administered Medications   Medication Dose   • ferrous sulfate tablet 325 mg  325 mg   • LR infusion     • oxytocin (PITOCIN) infusion (for postpartum)   mL/hr   • miSOPROStol (CYTOTEC) tablet 800 mcg  800 mcg   • lactated ringers (LR) infusion     • LR infusion     • PRN oxytocin (PITOCIN) (20 Units/1000 mL) PRN for excessive uterine bleeding - See Admin Instr  125-999 mL/hr   • miSOPROStol (CYTOTEC) tablet 600 mcg  600 mcg   • methylergonovine (METHERGINE) injection 0.2 mg  0.2 mg   • docusate sodium (COLACE) capsule 100 mg  100 mg   • bisacodyl (DULCOLAX) suppository 10 mg  10 mg   • simethicone (MYLICON) chewable tab 80 mg  80 mg   • prenatal plus vitamin (STUARTNATAL 1+1) 27-1 MG tablet 1 tablet  1 tablet   • ibuprofen (MOTRIN) tablet 600 mg  600 mg   • acetaminophen (Tylenol) tablet 325 mg  325 mg   • oxyCODONE-acetaminophen (PERCOCET) 5-325 MG per tablet 1 tablet  1 tablet   • oxyCODONE immediate-release (ROXICODONE) tablet 10 mg  10 mg   • morphine (pf) 4 mg/mL injection 4 mg  4 mg   • ondansetron (ZOFRAN) syringe/vial injection 4 mg  4 mg    Or   • ondansetron (ZOFRAN ODT) dispertab 4 mg  4 mg   • diphenhydrAMINE (BENADRYL) tablet/capsule 25 mg  25 mg    Or   • diphenhydrAMINE (BENADRYL) injection 25 mg  25 mg       Exam:  Breast Exam: Inspection negative. No  nipple discharge or bleeding. No masses or nodularity palpable  Abdomen: Abdomen soft, non-tender. BS normal. No masses,  No organomegaly  Fundus Non Tender: yes  Incision: none  Perineum: deferred  Extremity: extremities, peripheral pulses and reflexes normal     Labs:         Activity:   Discharge to home  Pelvic Rest x 6 weeks    Assessment:  normal postpartum course and good candidate for tubal ligation  Discharge Assessment: No heavy bleeding or foul vaginal discharge   Staples to be removed and steristrips applied.      Follow up: [facility] in 5 weeks for vaginal ; 1 week for incision check.      Discharge Meds:   Current Outpatient Medications   Medication Sig Dispense Refill   • oxyCODONE-acetaminophen (PERCOCET) 5-325 MG Tab Take 1 tablet by mouth every 6 hours as needed for up to 7 days. 20 tablet 0   • ferrous sulfate 325 (65 Fe) MG tablet Take 1 tablet by mouth 2 times a day with meals. 60 tablet 0   • ibuprofen (MOTRIN) 600 MG Tab Take 1 tablet by mouth every 6 hours as needed (For cramping after delivery; do not give if patient is receiving ketorolac (Toradol)). 30 tablet 0   • Misc. Devices (BREAST PUMP) Misc 1 Device as needed. 1 Each 0     I reviewed in detail with patient  all indications that would necessitate emergency care. We reviewed bleeding expectations as well as expected healing with perineal repairs. We discussed birth control options and she is aware that can order this at her 6 week PP appointment. Finally, we reviewed postpartum depression and anxiety. She verbalizes understanding.  I have encouraged pelvic rest and use of condom if she does desire to have intercourse.      TAMIKA Abdalla

## 2021-05-13 NOTE — CARE PLAN
Problem: Altered physiologic condition related to postoperative  delivery  Goal: Patient physiologically stable as evidenced by normal lochia, palpable uterine involution and vital signs within normal limits  Outcome: Progressing  Note: Assessment normal, vital signs stable. Reviewed normal lochia changes with patient.     Problem: Alteration in comfort related to surgical incision and/or after birth pains  Goal: Patient is able to ambulate, care for self and infant with acceptable pain level  Outcome: Progressing  Note: Patient makes her needs known and prefers to be offered pain meds as they are available. She is performing ADLs and ambulating to NICU as tolerated.   The patient is Stable - Low risk of patient condition declining or worsening         Summary of progress made towards problems/goals:  Progressing as expected

## 2021-05-13 NOTE — DISCHARGE PLANNING
Meds-to-Beds: Discharge prescription orders listed below delivered to patient's bedside. RN notified. Patient counseled. Patient presented valid photo ID. Patient elected to have co-payment billed to patient account.        Brenda Westbrook   Home Medication Instructions [id number]    Printed on:05/13/21 1135   Medication Information                      ferrous sulfate 325 (65 Fe) MG tablet  Take 1 tablet by mouth 2 times a day with meals.             ibuprofen (MOTRIN) 600 MG Tab  Take 1 tablet by mouth every 6 hours as needed for cramping after delivery             oxyCODONE-acetaminophen (PERCOCET) 5-325 MG Tab  Take 1 tablet by mouth every 6 hours as needed for up to 7 days.               Fiona Araiza, PharmD

## 2021-05-13 NOTE — LACTATION NOTE
This note was copied from a baby's chart.  Breast engorgement appears to be resolving this morning, mom's breasts are firm and full but easily palpable and no excessive redness.     Arranged for mother to go down to NICU and breast feed infant. Mother and infant discharge from hospital anticipated for today.     Mother has a hospital grade breast pump at bedside for home use, discussed letting baby do the bulk of removing milk and pump as a backup only to help regulate production.

## 2021-05-13 NOTE — DISCHARGE INSTRUCTIONS
PATIENT DISCHARGE EDUCATION INSTRUCTION SHEET  REASONS TO CALL YOUR OBSTETRICIAN  · Persistent fever, shaking, chills (Temperature higher than 100.4) may indicate you have an infection  · Heavy bleeding: soaking more than 1 pad per hour; Passing clots an egg-sized clot or bigger may mean you have an postpartum hemorrhage  · Foul odor from vagina or bad smelling or discolored discharge or blood  · Breast infection (Mastitis symptoms); breast pain, chills, fever, redness or red streaks, may feel flu like symptoms  · Urinary pain, burning or frequency  · Incision that is not healing, increased redness, swelling, tenderness or pain, or any pus from episiotomy or  site may mean you have an infection  · Redness, swelling, warmth, or painful to touch in the calf area of your leg may mean you have a blood clot  · Severe or intensified depression, thoughts or feelings of wanting to hurt yourself or someone else   · Pain in chest, obstructed breathing or shortness of breath (trouble catching your breath) may mean you are having a postpartum complication. Call your provider immediately   · Headache that does not get better, even after taking medicine, a bad headache with vision changes or pain in the upper right area of your belly may mean you have high blood pressure or post birth preeclampsia. Call your provider immediately    HAND WASHING  All family and friends should wash their hands:  · Before and after holding the baby  · Before feeding the baby  · After using the restroom or changing the baby's diaper    WOUND CARE  Ask your physician for additional care instructions. In general:  ·  Incision:  · May shower and pat incision dry   · Keep the incision clean and dry  · There should not be any opening or pus from the incision  · Continue to walk at home 3 times a day   · Do NOT lift anything heavier than your baby (over 10 pounds)  · Encourage family to help participate in care of the  to allow  "rest and mom time to heal    VAGINAL CARE AND BLEEDING  · Nothing inside vagina for 6 weeks:   · No sexual intercourse, tampons or douching  · Bleeding may continue for 2-4 weeks. Amount and color may vary  · Soaking 1 pad or more in an hour for several hours is considered heavy bleeding  · Passing large egg sized blood clots can be concerning  · If you feel like you have heavy bleeding or are having increasing amount of blood clots call your Obstetrician immediately  · If you begin feeling faint upon standing, feeling sick to your stomach, have clammy skin, a really fast heartbeat, have chills, start feeling confused, dizzy, sleepy or weak, or feeling like you're going to faint call your Obstetrician immediately    HYPERTENSION   Preeclampsia or gestational hypertension are types of high blood pressure that only pregnant women can get. It is important for you to be aware of symptoms to seek early intervention and treatment. If you have any of these symptoms immediately call your Obstetrician    · Vision changes or blurred vision   · Severe headache or pain that is unrelieved with medication and will not go away  · Persistent pain in upper abdomen or shoulder   · Increased swelling of face, feet, or hands  · Difficulty breathing or shortness of breath at rest  · Urinating less than usual    URINATION AND BOWEL MOVEMENTS  · Eating more fiber (bran cereal, fruits, and vegetables) and drinking plenty of fluids will help to avoid constipation  · Urinary frequency and urgency after childbirth is normal  · If you experience any urinary pain, burning or frequency call your provider    BIRTH CONTROL  · It is possible to become pregnant at any time after delivery and while breastfeeding  · Plan to discuss a method of birth control with your physician at your post delivery follow up visit    POSTPARTUM BLUES  During the first few days after birth, you may experience a sense of the \"blues\" which may include impatience, " "irritability or even crying. These feelings come and go quickly. However, as many as 1 in 10 women experience emotional symptoms known as postpartum depression.     POSTPARTUM DEPRESSION    May start as early as the second or third day after delivery or take several weeks or months to develop. Symptoms of \"blues\" are present, but are more intense: Crying spells; loss of appetite; feelings of hopelessness or loss of control; fear of touching the baby; over concern or no concern at all about the baby; little or no concern about your own appearance/caring for yourself; and/or inability to sleep or excessive sleeping. Contact your Obstetrician if you are experiencing any of these symptoms     PREVENTING SHAKEN BABY  If you are angry or stressed, PUT THE BABY IN THE CRIB, step away, take some deep breaths, and wait until you are calm to care for the baby. DO NOT SHAKE THE BABY. You are not alone, call a supporter for help.  · [facility] 24/7 crisis call line (073-308-7027) or (1-651.528.4348)  · You can also text them, text \"ANSWER\" (397198)      "

## 2021-05-20 ENCOUNTER — POST PARTUM (OUTPATIENT)
Dept: OBGYN | Facility: CLINIC | Age: 26
End: 2021-05-20
Payer: COMMERCIAL

## 2021-05-20 VITALS — DIASTOLIC BLOOD PRESSURE: 64 MMHG | WEIGHT: 141 LBS | BODY MASS INDEX: 24.98 KG/M2 | SYSTOLIC BLOOD PRESSURE: 105 MMHG

## 2021-05-20 DIAGNOSIS — Z09 POSTOP CHECK: ICD-10-CM

## 2021-05-20 PROCEDURE — 99024 POSTOP FOLLOW-UP VISIT: CPT | Performed by: OBSTETRICS & GYNECOLOGY

## 2021-05-20 RX ORDER — BUTALBITAL, ACETAMINOPHEN AND CAFFEINE 50; 325; 40 MG/1; MG/1; MG/1
1 TABLET ORAL EVERY 4 HOURS PRN
COMMUNITY
End: 2021-10-01 | Stop reason: SDUPTHER

## 2021-05-20 ASSESSMENT — EDINBURGH POSTNATAL DEPRESSION SCALE (EPDS)
I HAVE BEEN ANXIOUS OR WORRIED FOR NO GOOD REASON: YES, SOMETIMES
I HAVE BLAMED MYSELF UNNECESSARILY WHEN THINGS WENT WRONG: NOT VERY OFTEN
TOTAL SCORE: 6
I HAVE LOOKED FORWARD WITH ENJOYMENT TO THINGS: AS MUCH AS I EVER DID
THE THOUGHT OF HARMING MYSELF HAS OCCURRED TO ME: NEVER
I HAVE BEEN SO UNHAPPY THAT I HAVE HAD DIFFICULTY SLEEPING: NOT AT ALL
I HAVE BEEN ABLE TO LAUGH AND SEE THE FUNNY SIDE OF THINGS: AS MUCH AS I ALWAYS COULD
I HAVE FELT SCARED OR PANICKY FOR NO GOOD REASON: NO, NOT MUCH
I HAVE FELT SAD OR MISERABLE: NOT VERY OFTEN
I HAVE BEEN SO UNHAPPY THAT I HAVE BEEN CRYING: NO, NEVER
THINGS HAVE BEEN GETTING ON TOP OF ME: NO, MOST OF THE TIME I HAVE COPED QUITE WELL

## 2021-05-20 ASSESSMENT — FIBROSIS 4 INDEX: FIB4 SCORE: 0.55

## 2021-05-20 NOTE — NON-PROVIDER
Pt here today for C/s check.  C/s was on 05/09/2021  Currently both bottle and breast feeding  C/o some pain on the right side  Phone # [phone]  Pharmacy verified.

## 2021-05-29 ENCOUNTER — OFFICE VISIT (OUTPATIENT)
Dept: URGENT CARE | Facility: PHYSICIAN GROUP | Age: 26
End: 2021-05-29
Payer: COMMERCIAL

## 2021-05-29 VITALS
RESPIRATION RATE: 18 BRPM | HEART RATE: 95 BPM | BODY MASS INDEX: 25.52 KG/M2 | SYSTOLIC BLOOD PRESSURE: 118 MMHG | TEMPERATURE: 97.8 F | HEIGHT: 63 IN | WEIGHT: 144 LBS | OXYGEN SATURATION: 98 % | DIASTOLIC BLOOD PRESSURE: 78 MMHG

## 2021-05-29 DIAGNOSIS — N61.0 MASTITIS: ICD-10-CM

## 2021-05-29 PROCEDURE — 99213 OFFICE O/P EST LOW 20 MIN: CPT | Performed by: PHYSICIAN ASSISTANT

## 2021-05-29 RX ORDER — CLINDAMYCIN HYDROCHLORIDE 300 MG/1
300 CAPSULE ORAL 3 TIMES DAILY
Qty: 21 CAPSULE | Refills: 0 | Status: SHIPPED | OUTPATIENT
Start: 2021-05-29 | End: 2021-06-05

## 2021-05-29 ASSESSMENT — ENCOUNTER SYMPTOMS
NAUSEA: 0
HEADACHES: 0
CLAUDICATION: 0
CHILLS: 0
BLURRED VISION: 0
PALPITATIONS: 0
COUGH: 0
FOCAL WEAKNESS: 0
TINGLING: 0
ABDOMINAL PAIN: 0
SPEECH CHANGE: 0
SEIZURES: 0
TREMORS: 0
DIARRHEA: 0
FEVER: 0
WEAKNESS: 0
VOMITING: 0
LOSS OF CONSCIOUSNESS: 0
ORTHOPNEA: 0
SENSORY CHANGE: 0
DOUBLE VISION: 0
SHORTNESS OF BREATH: 0
DIZZINESS: 0

## 2021-05-29 ASSESSMENT — FIBROSIS 4 INDEX: FIB4 SCORE: 0.55

## 2021-05-29 NOTE — PROGRESS NOTES
Subjective:   Brenda Westbrook is a 26 y.o. female who presents for Other (x1 week, pt states mastitis, uncomfortable, swollen)      Patient is a 26-year-old female who presents for evaluation of right breast pain.  She states that this is been ongoing for almost a week.  She is recently had a  3 weeks ago and is breast feeding/pumping.  She states that she also has flulike symptoms of fever, chills malaise and body aches.      Review of Systems   Constitutional: Negative for chills and fever.   Eyes: Negative for blurred vision and double vision.   Respiratory: Negative for cough and shortness of breath.    Cardiovascular: Negative for chest pain, palpitations, orthopnea, claudication and leg swelling.   Gastrointestinal: Negative for abdominal pain, diarrhea, nausea and vomiting.   Musculoskeletal:        Right breast pain   Skin: Negative for rash.   Neurological: Negative for dizziness, tingling, tremors, sensory change, speech change, focal weakness, seizures, loss of consciousness, weakness and headaches.   All other systems reviewed and are negative.      Medications:    • Breast Pump Misc  • butalbital/apap/caffeine -40 mg Tabs  • clindamycin Caps  • famotidine Tabs  • ferrous sulfate  • ibuprofen Tabs  • PRENATAL 1 PO  • propranolol CR Cp24    Allergies: Ciprofloxacin, Codeine, Doxycycline, and Keflex    Problem List: Brenda Westbrook does not have any pertinent problems on file.    Surgical History:  Past Surgical History:   Procedure Laterality Date   • REPEAT C SECTOIN WITH SALPINGECTOMY Bilateral 2021    Procedure:  SECTION, REPEAT, WITH SALPINGECTOMY;  Surgeon: Jorge Alvarenga M.D.;  Location: SURGERY LABOR AND DELIVERY;  Service: Labor and Delivery   • REPEAT C SECTION N/A 9/15/2018    Procedure: REPEAT C SECTION;  Surgeon: Rachna Patterson M.D.;  Location: LABOR AND DELIVERY;  Service: Labor and Delivery   • PRIMARY C SECTION Bilateral 2016     "Procedure: PRIMARY C SECTION;  Surgeon: Trever Beckman M.D.;  Location: LABOR AND DELIVERY;  Service:    • MANNY BY LAPAROSCOPY  3/12/2015    Procedure: MANNY BY LAPAROSCOPY;  Surgeon: Sean Mitchell M.D.;  Location: SURGERY [facility];  Service:    • PELVISCOPY  2013   • CYSTOSCOPY  8/30/2012    Performed by QUINTIN LOPES at SURGERY [facility] ORS   • PELVISCOPY  4/19/2012    Performed by CHRISTIANO PUGH at SURGERY SAME DAY [facility] ORS   • MYRINGOTOMY     • STRABISMUS REPAIR      gerardo eyes   • TONSILLECTOMY         Past Social Hx: Brenda Westbrook  reports that she has never smoked. She has never used smokeless tobacco. She reports previous alcohol use. She reports that she does not use drugs.     Past Family Hx:  Brenda Westbrook family history includes Cancer in an other family member; Diabetes in an other family member; Heart Disease in an other family member; Hypertension in an other family member; Other in her brother and mother; Stroke in an other family member.     Problem list, medications, and allergies reviewed by myself today in Epic.     Objective:     Blood Pressure 118/78   Pulse 95   Temperature 36.6 °C (97.8 °F) (Temporal)   Respiration 18   Height 1.6 m (5' 3\")   Weight 65.3 kg (144 lb)   Oxygen Saturation 98%   Body Mass Index 25.51 kg/m²     Physical Exam  Vitals reviewed. Exam conducted with a chaperone present.   Constitutional:       General: She is not in acute distress.     Appearance: She is well-developed. She is not ill-appearing, toxic-appearing or diaphoretic.   HENT:      Head: Normocephalic and atraumatic.      Right Ear: External ear normal.      Left Ear: External ear normal.   Eyes:      Conjunctiva/sclera: Conjunctivae normal.   Cardiovascular:      Rate and Rhythm: Normal rate and regular rhythm.      Pulses: Normal pulses.      Heart sounds: Normal heart sounds.   Pulmonary:      Effort: Pulmonary effort is normal.      Breath sounds: " Normal breath sounds.   Chest:      Chest wall: Swelling and tenderness present.      Breasts:         Right: Swelling and tenderness present.     Musculoskeletal:         General: No deformity.      Cervical back: Normal range of motion and neck supple.   Lymphadenopathy:      Upper Body:      Right upper body: Axillary adenopathy present.      Left upper body: Axillary adenopathy present.   Skin:     General: Skin is warm and dry.   Neurological:      Mental Status: She is alert and oriented to person, place, and time.      Motor: No abnormal muscle tone.      Coordination: Coordination normal.      Deep Tendon Reflexes: Reflexes are normal and symmetric. Reflexes normal.   Psychiatric:         Behavior: Behavior normal.         Thought Content: Thought content normal.         Judgment: Judgment normal.         Assessment/Plan:     Medical Decision Making/Comments     Patient is a 26-year-old female who presents for evaluation of right breast pain for 1 week.  She is postpartum 3 weeks and is currently breast-feeding/pumping.  She complains of fevers, malaise and body aches.  Vital signs within normal limits.  Exam was done with female chaperone in room.  There is no erythema of the breast but palpable tenderness with swelling.  Given the flulike symptoms and inflammation of the breast likely this is mastitis.   Diagnosis and associated orders     1. Mastitis  clindamycin (CLEOCIN) 300 MG Cap              Differential diagnosis, natural history, supportive care, and indications for immediate follow-up discussed.    Advised the patient to follow-up with the primary care physician for recheck, reevaluation, and consideration of further management.    Please note that this dictation was created using voice recognition software. I have made a reasonable attempt to correct obvious errors, but I expect that there are errors of grammar and possibly content that I did not discover before finalizing the note.

## 2021-05-29 NOTE — PATIENT INSTRUCTIONS
"Breastfeeding and Human Lactation (4th ed., pp. 262-299). ROSLYN Cummings: [employer].\">   Breastfeeding and Mastitis    Mastitis is inflammation of the breast tissue. It can occur in women who are breastfeeding. This can make breastfeeding painful. Mastitis will sometimes go away on its own, especially if it is not caused by an infection (non-infectious mastitis). Your health care provider will help determine if medical treatment is needed. Treatment may be needed if the condition is caused by a bacterial infection (infectious mastitis).  What are the causes?  This condition is often associated with a blocked milkduct, which can happen when too much milk builds up in the breast. Causes of excess milk in the breast can include:  · Poor latch-on. If your baby is not latched onto the breast properly, he or she may not empty your breast completely while breastfeeding.  · Allowing too much time to pass between feedings.  · Wearing a bra or other clothing that is too tight. This puts extra pressure on the milk ducts so milk does not flow through them as it should.  · Milk remaining in the breast because it is overfilled (engorged).  · Stress and fatigue.  Mastitis can also be caused by a bacterial infection. Bacteria may enter the breast tissue through cuts, cracks, or openings in the skin near the nipple area. Cracks in the skin are often caused when your baby does not latch on properly to the breast.  What are the signs or symptoms?  Symptoms of this condition include:  · Swelling, redness, tenderness, and pain in an area of the breast. This usually affects the upper part of the breast, toward the armpit region. In most cases, it affects only one breast. In some cases, it may occur on both breasts at the same time and affect a larger portion of breast tissue.  · Swelling of the glands under the arm on the same side.  · Fatigue, headache, and flu-like muscle aches.  · Fever.  · Rapid pulse.  Symptoms " usually last 2 to 5 days. Breast pain and redness are at their worst on day 2 and day 3, and they usually go away by day 5. If an infection is left to progress, a collection of pus (abscess) may develop.  How is this diagnosed?  This condition can be diagnosed based on your symptoms and a physical exam. You may also have tests, such as:  · Blood tests to determine if your body is fighting a bacterial infection.  · Mammogram or ultrasound tests to rule out other problems or diseases.  · Fluid tests. If an abscess has developed, the fluid in the abscess may be removed with a needle. The fluid may be analyzed to determine if bacteria are present.  · Breast milk may be cultured and tested for bacteria.  How is this treated?  This condition will sometimes go away on its own. Your health care provider may choose to wait 24 hours after first seeing you to decide whether treatment is needed. If treatment is needed, it may include:  · Strategies to manage breastfeeding. This includes continuing to breastfeed or pump in order to allow adequate milk flow, using breast massage, and applying heat or cold to the affected area.  · Self-care such as rest and increased fluid intake.  · Medicine for pain.  · Antibiotic medicine to treat a bacterial infection. This is usually taken by mouth.  · If an abscess has developed, it may be treated by removing fluid with a needle.  Follow these instructions at home:  Medicines  · Take over-the-counter and prescription medicines only as told by your health care provider.  · If you were prescribed an antibiotic medicine, take it as told by your health care provider. Do not stop taking the antibiotic even if you start to feel better.  General instructions  · Do not wear a tight or underwire bra. Wear a soft, supportive bra.  · Increase your fluid intake, especially if you have a fever.  · Get plenty of rest.  For breastfeeding:  · Continue to empty your breasts as often as possible, either by  breastfeeding or using an electric breast pump. This will lower the pressure and the pain that comes with it. Ask your health care provider if changes need to be made to your breastfeeding or pumping routine.  · Keep your nipples clean and dry.  · During breastfeeding, empty the first breast completely before going to the other breast. If your baby is not emptying your breasts completely, use a breast pump to empty your breasts.  · Use breast massage during feeding or pumping sessions.  · If directed, apply moist heat to the affected area of your breast right before breastfeeding or pumping. Use the heat source that your health care provider recommends.  · If directed, put ice on the affected area of your breast right after breastfeeding or pumping:  ? Put ice in a plastic bag.  ? Place a towel between your skin and the bag.  ? Leave the ice on for 20 minutes.  · If you go back to work, pump your breasts while at work to stay in time with your nursing schedule.  · Do not allow your breasts to become engorged.  Contact a health care provider if:  · You have pus-like discharge from the breast.  · You have a fever.  · Your symptoms do not improve within 2 days of starting treatment.  · Your symptoms return after you have recovered from a breast infection.  Get help right away if:  · Your pain and swelling are getting worse.  · You have pain that is not controlled with medicine.  · You have a red line extending from the breast toward your armpit.  Summary  · Mastitis is inflammation of the breast tissue. It is often caused by a blocked milk duct or bacteria.  · This condition may be treated with hot and cold compresses, medicines, self-care, and certain breastfeeding strategies.  · If you were prescribed an antibiotic medicine, take it as told by your health care provider. Do not stop taking the antibiotic even if you start to feel better.  · Continue to empty your breasts as often as possible either by breastfeeding or  using an electric breast pump.  This information is not intended to replace advice given to you by your health care provider. Make sure you discuss any questions you have with your health care provider.  Document Released: 04/14/2006 Document Revised: 09/06/2019 Document Reviewed: 12/19/2017  Elsevier Patient Education © 2020 [employer]

## 2021-06-21 ENCOUNTER — POST PARTUM (OUTPATIENT)
Dept: OBGYN | Facility: CLINIC | Age: 26
End: 2021-06-21
Payer: COMMERCIAL

## 2021-06-21 VITALS — WEIGHT: 137 LBS | DIASTOLIC BLOOD PRESSURE: 82 MMHG | SYSTOLIC BLOOD PRESSURE: 123 MMHG | BODY MASS INDEX: 24.27 KG/M2

## 2021-06-21 PROCEDURE — 90050 PR POSTPARTUM VISIT: CPT | Performed by: OBSTETRICS & GYNECOLOGY

## 2021-06-21 RX ORDER — SERTRALINE HYDROCHLORIDE 25 MG/1
25 TABLET, FILM COATED ORAL DAILY
Qty: 30 TABLET | Refills: 11 | Status: SHIPPED | OUTPATIENT
Start: 2021-06-21 | End: 2021-10-01

## 2021-06-21 ASSESSMENT — EDINBURGH POSTNATAL DEPRESSION SCALE (EPDS)
THE THOUGHT OF HARMING MYSELF HAS OCCURRED TO ME: NEVER
I HAVE LOOKED FORWARD WITH ENJOYMENT TO THINGS: AS MUCH AS I EVER DID
I HAVE FELT SCARED OR PANICKY FOR NO GOOD REASON: YES, SOMETIMES
THINGS HAVE BEEN GETTING ON TOP OF ME: YES, SOMETIMES I HAVEN'T BEEN COPING AS WELL AS USUAL
I HAVE BEEN ANXIOUS OR WORRIED FOR NO GOOD REASON: YES, SOMETIMES
I HAVE BEEN SO UNHAPPY THAT I HAVE HAD DIFFICULTY SLEEPING: NOT AT ALL
I HAVE BLAMED MYSELF UNNECESSARILY WHEN THINGS WENT WRONG: YES, SOME OF THE TIME
I HAVE FELT SAD OR MISERABLE: NOT VERY OFTEN
I HAVE BEEN ABLE TO LAUGH AND SEE THE FUNNY SIDE OF THINGS: NOT QUITE SO MUCH NOW
I HAVE BEEN SO UNHAPPY THAT I HAVE BEEN CRYING: ONLY OCCASIONALLY
TOTAL SCORE: 11

## 2021-06-21 ASSESSMENT — FIBROSIS 4 INDEX: FIB4 SCORE: 0.55

## 2021-06-21 NOTE — PROGRESS NOTES
Postpartum;    Brenda Westbrook is 26 y.o. female  status post  section with bilateral salpingectomy, doing well today. Currently nursing without difficulty    Physical examination;    Breast examination-no dominant masses, no skin retraction, no axillary adenopathy, no evidence of mastitis    Pelvic examination;  External genitalia-no visible lesions  Vagina-no discharge or blood  Cervix-no gross lesions  Uterus-normal size and shape, nontender  Adnexa without mass or tenderness     Abdomen-nondistended positive bowel sounds soft nontender without masses or hepatosplenomegaly      Impression;  Normal postpartum    Plan;  Birth control bilateral salpingectomy  Annual-1 year

## 2021-06-21 NOTE — NON-PROVIDER
Pt here today for postpartum exam.  Operation Date: 05/09/2021 C/S   Currently: Breastfeeding   BCM: Tubal Ligation   LMP: None yet   Good ph:[phone]   Mariam Carlin   EPDS -11

## 2021-08-24 ENCOUNTER — TELEPHONE (OUTPATIENT)
Dept: OBGYN | Facility: CLINIC | Age: 26
End: 2021-08-24

## 2021-08-24 NOTE — TELEPHONE ENCOUNTER
Called Pt and advise that she needs to go to the ER due to excessive bleeding. Informed Pt that if she has been using super 3 tampons within an hr then she needs to get an immediate help. Pt agreed and has no other further questions.      ----- Message from Brenda Westbrook sent at 8/23/2021  5:14 PM PDT -----  Regarding: Postpartum Period Question  I have started my first postpartum period (almost 4 months postpartum) and I feel like I'm bleeding excessively. I don't think it's super urgent and maybe it's normal, but I'm bleeding through 3 super tampons an hour sometimes less. Should I be concerned? Do I need to make actual appointment? I also had my tubes removed, so I don't know if that contributes to it at all?

## 2021-08-29 ENCOUNTER — APPOINTMENT (OUTPATIENT)
Dept: TELEHEALTH | Facility: TELEMEDICINE | Age: 26
End: 2021-08-29
Payer: COMMERCIAL

## 2021-10-01 ENCOUNTER — OFFICE VISIT (OUTPATIENT)
Dept: NEUROLOGY | Facility: MEDICAL CENTER | Age: 26
End: 2021-10-01
Attending: PSYCHIATRY & NEUROLOGY
Payer: COMMERCIAL

## 2021-10-01 VITALS
DIASTOLIC BLOOD PRESSURE: 68 MMHG | HEIGHT: 63 IN | BODY MASS INDEX: 26.56 KG/M2 | HEART RATE: 64 BPM | OXYGEN SATURATION: 96 % | SYSTOLIC BLOOD PRESSURE: 122 MMHG | WEIGHT: 149.91 LBS

## 2021-10-01 DIAGNOSIS — G43.009 MIGRAINE WITHOUT AURA AND WITHOUT STATUS MIGRAINOSUS, NOT INTRACTABLE: ICD-10-CM

## 2021-10-01 PROBLEM — G43.019 INTRACTABLE MIGRAINE WITHOUT AURA AND WITHOUT STATUS MIGRAINOSUS: Status: RESOLVED | Noted: 2017-04-19 | Resolved: 2021-10-01

## 2021-10-01 PROCEDURE — 99213 OFFICE O/P EST LOW 20 MIN: CPT | Performed by: PSYCHIATRY & NEUROLOGY

## 2021-10-01 PROCEDURE — 99211 OFF/OP EST MAY X REQ PHY/QHP: CPT | Performed by: PSYCHIATRY & NEUROLOGY

## 2021-10-01 RX ORDER — BUTALBITAL, ACETAMINOPHEN AND CAFFEINE 50; 325; 40 MG/1; MG/1; MG/1
1 TABLET ORAL EVERY 4 HOURS PRN
Qty: 30 TABLET | Refills: 3 | Status: SHIPPED | OUTPATIENT
Start: 2021-10-01 | End: 2022-04-01 | Stop reason: SDUPTHER

## 2021-10-01 ASSESSMENT — FIBROSIS 4 INDEX: FIB4 SCORE: 0.55

## 2021-10-01 ASSESSMENT — PATIENT HEALTH QUESTIONNAIRE - PHQ9: CLINICAL INTERPRETATION OF PHQ2 SCORE: 0

## 2021-10-01 ASSESSMENT — ENCOUNTER SYMPTOMS: HEADACHES: 1

## 2021-10-01 NOTE — PROGRESS NOTES
"Subjective     Brenda Westbrook is a 26 y.o. female who presents for follow-up, with a history of migraine without aura, now 3 months status post delivery of a baby girl.     HPI    Since last seen, she states that the headaches actually have not recurred following delivery of her little girl.  There were couple of weeks immediately in the pueriperial weeks that she had some headaches.  She was using Fioricet with good benefit.  Fioricet with codeine simply upset her stomach too much to be worth it.  Since then she has had maybe 1 headache, in fact today is the day of headache recurrence.  When Fioricet is used, it typically requires 1 or 2 tablets at outset, rarely will she repeat a second dose which is typically with 2 tablets.  She has not had to use the Fioricet at all until her headache which started today.    Though the headaches had taken off during the last trimester of her pregnancy, we did reintroduce Inderal which provided limited benefit.  She stopped this after a couple of weeks.  She has not had to restart, obviously, with her delivery.    Medical, surgical and family histories are reviewed, there are no new drug allergies.  From my standpoint, she is on Fioricet as needed.  Otherwise she is on a prenatal vitamin.  She has not had to reintroduce Zoloft, Motrin, Pepcid, or the Inderal  mg daily.    Review of Systems   Neurological: Positive for headaches.   All other systems reviewed and are negative.    Objective     /68   Pulse 64   Ht 1.6 m (5' 3\")   Wt 68 kg (149 lb 14.6 oz)   SpO2 96%   BMI 26.56 kg/m²      Physical Exam    She appears in no acute distress.  Vital signs are stable.  She is complaining of mild to moderate headache, there is some photophobia.  Still, she is very pleasant in spirit.  She looks a bit drawn because of the head pain.  There is no malar rash or jaw claudication.  Her neck is supple.  Cardiac evaluation is unremarkable.     Neurological " Exam    Including mental status, cranial nerve, musculoskeletal, coordination since evaluations, her full neurologic examination remains intact.    Assessment & Plan     1. Migraine without aura and without status migrainosus, not intractable  Stable, we will continue her with Fioricet rescue only.  Hopefully the headaches will not increase, though we have Inderal  mg to get her back on.  This drug did work effectively when she was not pregnant.  She tolerated it well.  Hopefully we will not need to go there.  She is certainly not overusing the Fioricet.  We will follow-up in 6 months.    - butalbital/apap/caffeine -40 mg (FIORICET) -40 MG Tab; Take 1 Tablet by mouth every four hours as needed for Migraine for up to 182 days.  Dispense: 30 Tablet; Refill: 3    Time: 20 minutes spent in total for patient care which involves precharting, record review, discussions with healthcare staff and documentation.  This also includes face-to-face time with the patient for exam, review, education, counseling and coordinating care.

## 2021-12-01 DIAGNOSIS — G43.009 MIGRAINE WITHOUT AURA AND WITHOUT STATUS MIGRAINOSUS, NOT INTRACTABLE: ICD-10-CM

## 2021-12-01 RX ORDER — PROPRANOLOL HCL 60 MG
CAPSULE, EXTENDED RELEASE 24HR ORAL
Qty: 60 CAPSULE | Refills: 3 | Status: SHIPPED | OUTPATIENT
Start: 2021-12-01 | End: 2022-09-23

## 2022-03-01 PROBLEM — S83.231A COMPLEX TEAR OF MEDIAL MENISCUS OF RIGHT KNEE AS CURRENT INJURY: Status: ACTIVE | Noted: 2022-03-01

## 2022-03-08 ENCOUNTER — HOSPITAL ENCOUNTER (OUTPATIENT)
Facility: MEDICAL CENTER | Age: 27
End: 2022-03-08
Attending: ANESTHESIOLOGY
Payer: COMMERCIAL

## 2022-03-08 LAB
COVID ORDER STATUS COVID19: NORMAL
SARS-COV-2 RNA RESP QL NAA+PROBE: NOTDETECTED
SPECIMEN SOURCE: NORMAL

## 2022-03-08 PROCEDURE — U0005 INFEC AGEN DETEC AMPLI PROBE: HCPCS

## 2022-03-08 PROCEDURE — U0003 INFECTIOUS AGENT DETECTION BY NUCLEIC ACID (DNA OR RNA); SEVERE ACUTE RESPIRATORY SYNDROME CORONAVIRUS 2 (SARS-COV-2) (CORONAVIRUS DISEASE [COVID-19]), AMPLIFIED PROBE TECHNIQUE, MAKING USE OF HIGH THROUGHPUT TECHNOLOGIES AS DESCRIBED BY CMS-2020-01-R: HCPCS

## 2022-03-14 PROBLEM — S83.231A COMPLEX TEAR OF MEDIAL MENISCUS OF RIGHT KNEE AS CURRENT INJURY, INITIAL ENCOUNTER: Status: ACTIVE | Noted: 2022-03-14

## 2022-03-29 PROBLEM — S83.261A PERIPHERAL TEAR OF LATERAL MENISCUS OF RIGHT KNEE AS CURRENT INJURY: Status: ACTIVE | Noted: 2022-03-29

## 2022-04-01 ENCOUNTER — OFFICE VISIT (OUTPATIENT)
Dept: NEUROLOGY | Facility: MEDICAL CENTER | Age: 27
End: 2022-04-01
Attending: PSYCHIATRY & NEUROLOGY
Payer: COMMERCIAL

## 2022-04-01 VITALS
DIASTOLIC BLOOD PRESSURE: 56 MMHG | OXYGEN SATURATION: 96 % | SYSTOLIC BLOOD PRESSURE: 118 MMHG | BODY MASS INDEX: 23.04 KG/M2 | TEMPERATURE: 98 F | WEIGHT: 130 LBS | HEIGHT: 63 IN | HEART RATE: 107 BPM

## 2022-04-01 DIAGNOSIS — G43.009 MIGRAINE WITHOUT AURA AND WITHOUT STATUS MIGRAINOSUS, NOT INTRACTABLE: ICD-10-CM

## 2022-04-01 DIAGNOSIS — G44.40 MEDICATION OVERUSE HEADACHE: Primary | ICD-10-CM

## 2022-04-01 PROCEDURE — 99214 OFFICE O/P EST MOD 30 MIN: CPT | Performed by: PSYCHIATRY & NEUROLOGY

## 2022-04-01 PROCEDURE — 99211 OFF/OP EST MAY X REQ PHY/QHP: CPT | Performed by: PSYCHIATRY & NEUROLOGY

## 2022-04-01 RX ORDER — BUTALBITAL, ACETAMINOPHEN AND CAFFEINE 50; 325; 40 MG/1; MG/1; MG/1
1 TABLET ORAL EVERY 4 HOURS PRN
Qty: 30 TABLET | Refills: 3 | Status: SHIPPED | OUTPATIENT
Start: 2022-04-01 | End: 2022-07-15 | Stop reason: SDUPTHER

## 2022-04-01 ASSESSMENT — ENCOUNTER SYMPTOMS
DEPRESSION: 0
FALLS: 0
DOUBLE VISION: 0
INSOMNIA: 1
MEMORY LOSS: 1
PHOTOPHOBIA: 0
HEADACHES: 1

## 2022-04-01 ASSESSMENT — FIBROSIS 4 INDEX: FIB4 SCORE: 0.57

## 2022-04-01 ASSESSMENT — PATIENT HEALTH QUESTIONNAIRE - PHQ9: CLINICAL INTERPRETATION OF PHQ2 SCORE: 0

## 2022-04-02 NOTE — PROGRESS NOTES
Greg Westbrook is a 27 y.o. female who presents for follow-up, with a history of migraine without aura, but who has been dealing with daily mild to moderate headaches beginning late last year, and which have not responded consistently to propranolol.    ROSE MARY Gutierrez states that she had been doing fairly well, but when the headaches recurred late last year, we reinitiated the Inderal  mg daily.  By the end of October, there was little benefit seen, unlike what had been present before.    She is now dealing with daily mild to moderate headaches, she functions but at a diminished level of efficacy.  She is now using OTC medicines on a regular basis, typically a combination of Tylenol and Motrin, 3 times a day.  The headache pain improved but then reverses itself requiring another dose being taken.  The severe headache attacks may be occur once every 2 months, these have improved.  With all of this she has noted impaired concentration and a lot more fatigued than usual.  She denies any other hypersensitivities to light, sound or touch.  She is having some problems with sleep as a result.    Medical, surgical and family histories are reviewed, there are no new drug allergies.  She suffered from a right knee injury related to kickboxing, with a torn lateral meniscus.  She just underwent surgery.  This has certainly not helped her mood.  Her 1-year-old is doing well, just lost her first tooth, but is also having problems with failure to thrive.  At 1 year of age, she should not be weighing just 14 pounds.    She is on Inderal  mg daily, Fioricet as needed, Zofran 4 mg as needed and prenatal vitamin.    Review of Systems   Constitutional: Positive for malaise/fatigue.   Eyes: Negative for double vision and photophobia.   Musculoskeletal: Negative for falls.   Neurological: Positive for headaches.   Psychiatric/Behavioral: Positive for memory loss. Negative for depression. The patient  "has insomnia.    All other systems reviewed and are negative.    Objective     /56 (BP Location: Right arm, Patient Position: Sitting, BP Cuff Size: Adult)   Pulse (!) 107   Temp 36.7 °C (98 °F) (Temporal)   Ht 1.6 m (5' 3\")   Wt 59 kg (130 lb) Comment: Patient Stated  LMP 03/10/2022   SpO2 96%   BMI 23.03 kg/m²      Physical Exam    She appears in no acute distress, though she is complaining of moderate headache pain.  She is quite cooperative.  There is no suggestion of photophobia.  Vital signs are stable.  There is tenderness bitemporally as well as over the scalp and occipital ridge bilaterally.  There is no jaw claudication.  There is also no malar rash.  The neck is supple, range of motion is full.  There is no spasm of the paraspinal muscle bodies.  Cardiac evaluation reveals a regular rhythm.     Neurological Exam    Including mental status, cranial nerves, musculoskeletal, reflex, coordination, and since evaluations, her full neurologic examination reveals no evidence of deficits.  Examination of the right lower extremity is limited because of her knee brace.    Toradol was offered to help with the headache she is dealing with, she refused.    Assessment & Plan     1. Medication overuse headache  Though the migraines themselves are actually quite infrequent, I am very concerned about the daily headaches which are of moderate severity, her regular use of OTC medicines is making a medication overuse problem of all of this.  The headaches have some migrainous features, so using a migraine medication might still prove beneficial.    Inderal will be discontinued (see below), Qulipta 60 mg daily will be started, samples were provided. A new CGRP-related maintenance therapy, it is shown to be effective in both episodic as well as chronic migraine.  We will renew the Fioricet.  She was told she needs to stop the OTC medication use if she is to have any chance of getting rid of these daily " headaches.    2. Migraine without aura and without status migrainosus, not intractable  Fortunately, these headaches have not increased.  She has the Fioricet to use as needed.  Getting off the Inderal probably will have no deleterious effect with an increase in these headaches.  If so, she can reinitiate the drug, but we will still use the new Qulipta as an add-on therapy.  We will follow-up in 6 months otherwise, communication via [employer] otherwise.    Time: 30 minutes in total spent on patient care including prior charting, record review, discussions with healthcare staff and documentation.  This included face-to-face time with the patient for exam, review, discussion, as well as counseling and coordinating care.

## 2022-04-06 DIAGNOSIS — G43.009 MIGRAINE WITHOUT AURA AND WITHOUT STATUS MIGRAINOSUS, NOT INTRACTABLE: ICD-10-CM

## 2022-04-06 RX ORDER — ATOGEPANT 60 MG/1
1 TABLET ORAL DAILY
Qty: 30 TABLET | Refills: 5 | Status: SHIPPED | OUTPATIENT
Start: 2022-04-06 | End: 2022-06-21

## 2022-05-31 ENCOUNTER — GYNECOLOGY VISIT (OUTPATIENT)
Dept: OBGYN | Facility: CLINIC | Age: 27
End: 2022-05-31
Payer: COMMERCIAL

## 2022-05-31 VITALS — BODY MASS INDEX: 25.69 KG/M2 | WEIGHT: 145 LBS | SYSTOLIC BLOOD PRESSURE: 112 MMHG | DIASTOLIC BLOOD PRESSURE: 69 MMHG

## 2022-05-31 DIAGNOSIS — Z01.419 WOMEN'S ANNUAL ROUTINE GYNECOLOGICAL EXAMINATION: ICD-10-CM

## 2022-05-31 PROCEDURE — 99395 PREV VISIT EST AGE 18-39: CPT | Performed by: OBSTETRICS & GYNECOLOGY

## 2022-05-31 RX ORDER — LEVONORGESTREL AND ETHINYL ESTRADIOL 0.1-0.02MG
1 KIT ORAL DAILY
Qty: 28 TABLET | Refills: 11 | Status: SHIPPED | OUTPATIENT
Start: 2022-05-31 | End: 2023-02-21

## 2022-05-31 ASSESSMENT — FIBROSIS 4 INDEX: FIB4 SCORE: 0.57

## 2022-05-31 NOTE — PROGRESS NOTES
Annual examination;  This patient is a 27 y.o. female  using bilateral salpingectomy for birth control.  Patient states that her menstrual cycles last 5 days are very heavy she has trouble leaving the house due to bleeding through her clothing        /69 (BP Location: Right arm, Patient Position: Sitting, BP Cuff Size: Small adult)   Wt 65.8 kg (145 lb)   LMP 2022 (Exact Date)   BMI 25.69 kg/m²     Physical examination;  Breast examination- No dominant masses, No skin retraction, No axillary adenopathy    Pelvic examination;  External genitalia-No visible lesions, urethra normal in appearance, Buxton's glands normal  Vagina-No blood or discharge, bladder normal in position without gross lesions  Cervix-No gross lesions  Uterus-Normal size and shape,  No tenderness  Adnexa No mass or tenderness    Abdomen-nondistended positive bowel sounds soft nontender without masses or hepatosplenomegaly    Impression;  Normal annual    Plan;  Bilateral salpingectomy for birth control  We will start low-dose OCP for heavy menstrual cycles  Encouraged to use ibuprofen 400 to 600 mg every 8 hours this may also help with her bleeding    Start Mammogram age 39 yrs.    Female chaperone present during entire history and physical examination

## 2022-06-21 DIAGNOSIS — G43.009 MIGRAINE WITHOUT AURA AND WITHOUT STATUS MIGRAINOSUS, NOT INTRACTABLE: ICD-10-CM

## 2022-06-21 RX ORDER — IMIPRAMINE HCL 25 MG
TABLET ORAL
Qty: 84 TABLET | Refills: 0 | Status: SHIPPED | OUTPATIENT
Start: 2022-06-21 | End: 2022-07-19 | Stop reason: SDUPTHER

## 2022-07-03 ENCOUNTER — HOSPITAL ENCOUNTER (EMERGENCY)
Facility: MEDICAL CENTER | Age: 27
End: 2022-07-03
Attending: EMERGENCY MEDICINE
Payer: COMMERCIAL

## 2022-07-03 ENCOUNTER — APPOINTMENT (OUTPATIENT)
Dept: RADIOLOGY | Facility: MEDICAL CENTER | Age: 27
End: 2022-07-03
Attending: EMERGENCY MEDICINE
Payer: COMMERCIAL

## 2022-07-03 VITALS
HEIGHT: 63 IN | RESPIRATION RATE: 16 BRPM | WEIGHT: 140 LBS | HEART RATE: 70 BPM | TEMPERATURE: 98.4 F | OXYGEN SATURATION: 99 % | DIASTOLIC BLOOD PRESSURE: 78 MMHG | BODY MASS INDEX: 24.8 KG/M2 | SYSTOLIC BLOOD PRESSURE: 125 MMHG

## 2022-07-03 DIAGNOSIS — S93.402A SPRAIN OF LEFT ANKLE, UNSPECIFIED LIGAMENT, INITIAL ENCOUNTER: ICD-10-CM

## 2022-07-03 PROCEDURE — 99283 EMERGENCY DEPT VISIT LOW MDM: CPT

## 2022-07-03 PROCEDURE — 73610 X-RAY EXAM OF ANKLE: CPT | Mod: LT

## 2022-07-03 ASSESSMENT — FIBROSIS 4 INDEX: FIB4 SCORE: 0.57

## 2022-07-04 NOTE — ED NOTES
Pt brought back to [facility] 4 via wheelchair. Chart up for ERP.  CSM in tact in lower extremities.  Pain w/ movement in L ankle.

## 2022-07-04 NOTE — ED NOTES
Discharge instructions provided to pt.  ERP went over discharge instructions with pt.  Pt instructed to follow up with Orthopedics.  Pt verbalized understanding.  Instructed to return to Emergency Department for new or worsening symptoms.

## 2022-07-04 NOTE — ED TRIAGE NOTES
Chief Complaint   Patient presents with   • Ankle Pain     Hurt ankle 3 weeks ago, thought it would get better and now noticed it is worse. L sided     Pt wheeled to triage for above. Pt stepped up onto a curb about 3 weeks ago and felt something weird in her foot, then after walking significant amounts over the last week now notices swelling and bruising around ankle. CSM intact.

## 2022-07-04 NOTE — DISCHARGE INSTRUCTIONS
You can take 1000 g of Tylenol 3 times a day and 800 mg of Motrin 3 times a day to help with pain.  Please keep it elevated and ice it for 20 minutes at least 3 times a day to help with swelling.  Keep the orthopedic boot on to help stabilize it.  I have given the name and number of the [facility] to call make an appointment to be seen if you are still in persistent pain.  Come back if your symptoms worsen.  Thank you for coming in today.

## 2022-07-04 NOTE — ED PROVIDER NOTES
ED Provider Note    Scribed for Jean Paul Ruano by Jose Nair. 7/3/2022  8:12 PM    Primary care provider: WALTER Durán  Means of arrival: Walk in  History obtained from: Patient  History limited by: None    CHIEF COMPLAINT  Chief Complaint   Patient presents with   • Ankle Pain     Hurt ankle 3 weeks ago, thought it would get better and now noticed it is worse. L sided     HPI  Brenda Westbrook is a 27 y.o. female who presents to the Emergency Department for evaluation of left ankle pain. Patient states she hurt her left ankle 3 weeks ago after she stepped up over a curb and twisted the ankle. She did not get the ankle checked afterwards. She went to [facility] last week. States about 5 days ago, she stepped down and then could not walk for the rest of the trip. She is unsure if she twisted the ankle again. She has taken tylenol and advil for the pain. Denies icing the area. She does have crutches. Denies any medical problems.    Quality: Sharp pain  Duration: onset 3 weeks ago  Severity: Mild to moderate  Associated sx: left ankle pain    REVIEW OF SYSTEMS  As above, all other systems reviewed and are negative.   See HPI for further details.     PAST MEDICAL HISTORY   has a past medical history of Asthma, exercise induced, Bronchitis, Chlamydia, Cold, Endometriosis, Head ache, Indigestion, Intractable migraine without aura and without status migrainosus (4/19/2017), Other specified symptom associated with female genital organs, Pain, Pain (8/23/12), Psychiatric problem, Sleep disturbances, Unspecified disorder of thyroid (2015), and Urinary bladder disorder.  SURGICAL HISTORY   has a past surgical history that includes tonsillectomy; strabismus repair; myringotomy; cystoscopy (8/30/2012); pelviscopy (4/19/2012); pelviscopy (2013); joshua by laparoscopy (3/12/2015); primary c section (Bilateral, 6/11/2016); repeat c section (N/A, 9/15/2018); repeat c sectoin with salpingectomy  "(Bilateral, 5/9/2021); knee scope,med/lat menisectomy (Right, 3/14/2022); and meniscal repair (Right, 3/14/2022).  SOCIAL HISTORY  Social History     Tobacco Use   • Smoking status: Never Smoker   • Smokeless tobacco: Never Used   Vaping Use   • Vaping Use: Never used   Substance Use Topics   • Alcohol use: Yes     Comment: occasionally   • Drug use: No      Social History     Substance and Sexual Activity   Drug Use No     FAMILY HISTORY  Family History   Problem Relation Age of Onset   • Other Mother         Migraine   • Other Brother         Migraine   • Diabetes Other    • Hypertension Other    • Stroke Other    • Cancer Other    • Heart Disease Other      CURRENT MEDICATIONS  Home Medications     Reviewed by Chelsea Allen R.N. (Registered Nurse) on 07/03/22 at 1837  Med List Status: Not Addressed   Medication Last Dose Status   butalbital/apap/caffeine (FIORICET) -40 mg Tab  Active   imipramine (TOFRANIL) 25 MG Tab  Active   levonorgestrel-ethinyl estradiol (AVIANE) 0.1-20 MG-MCG per tablet  Active   ondansetron (ZOFRAN) 4 MG Tab tablet  Active   Prenatal MV-Min-Fe Fum-FA-DHA (PRENATAL 1 PO)  Active   propranolol LA (INDERAL LA) 60 MG CAPSULE SR 24 HR  Active              ALLERGIES  Allergies   Allergen Reactions   • Ciprofloxacin Vomiting   • Codeine Hives     Tolerates oxycodone, morphine, and hydromorphone   • Doxycycline Itching and Vomiting   • Keflex Itching       PHYSICAL EXAM    VITAL SIGNS:   Vitals:    07/03/22 1810 07/03/22 1834 07/03/22 2015   BP: 119/76  (!) 143/86   Pulse: 75  86   Resp: 16  18   Temp: 37.1 °C (98.7 °F)     TempSrc: Temporal     SpO2: 100%  98%   Weight:  63.5 kg (140 lb)    Height:  1.6 m (5' 3\")        Vitals: My interpretation: normotensive, not tachycardic, afebrile, not hypoxic    Reinterpretation of vitals: Unchanged    PE:   Constitutional: Well developed, Well nourished, No acute distress, Non-toxic appearance.   HENT: Normocephalic, Atraumatic, Bilateral external " ears normal, Oropharynx is clear mucous membranes are moist. No oral exudates or nasal discharge.   Eyes: Pupils are equal round and reactive, EOMI, Conjunctiva normal, No discharge.   Neck: Normal range of motion, No tenderness, Supple, No stridor. No meningismus.  Lymphatic: No lymphadenopathy noted.   Cardiovascular: Regular rate and rhythm without murmur rub or gallop.  Thorax & Lungs: Clear breath sounds bilaterally without wheezes, rhonchi or rales. There is no chest wall tenderness.   Abdomen: Soft non-tender non-distended. There is no rebound or guarding. No organomegaly is appreciated. Bowel sounds are normal.  Skin: Normal without rash.   Back: No CVA or spinal tenderness.   Extremities: Intact distal pulses, No edema, No tenderness, No cyanosis, No clubbing. Capillary refill is less than 2 seconds.  Musculoskeletal: Good range of motion in all major joints. No deformity or swelling of left ankle, neurovascularly intact, normal capillary refill. Mildly tender over left lateral malleolus, refuses to bear weight.  Neurologic: Alert & oriented x 3, Normal motor function, Normal sensory function, No focal deficits noted. Reflexes are normal.  Psychiatric: Affect normal, Judgment normal, Mood normal. There is no suicidal ideation or patient reported hallucinations.     DIAGNOSTIC STUDIES / PROCEDURES    RADIOLOGY  DX-ANKLE 3+ VIEWS LEFT   Final Result      No evidence of acute fracture or dislocation.        The radiologist's interpretation of all radiological studies have been reviewed by me.    COURSE & MEDICAL DECISION MAKING  Nursing notes, VS, PMSFHx, labs, imaging, EKG reviewed in chart.    MDM: 8:12 PM Brenda Westbrook is a 27 y.o. female who presented with traumatic left ankle sprain through ago, reinjury about a week ago.  Has been having difficulty bearing weight since.  No deformity.  Is not been seen for this.  X-ray negative.  Physical exam shows that she is neurovascular intact, no obvious  deformity, no obvious swelling, mildly tender over the lateral malleolus region.  Patient instructed on appropriate Tylenol, Motrin, ice, elevation and rest.  She will follow-up with [facility] as needed as a nonemergent outpatient.  Orthopedic boot placed for comfort.  Patient verbalized understand strict return cautions outpatient follow-up plan.    FINAL IMPRESSION  1. Sprain of left ankle, unspecified ligament, initial encounter Acute       Jose NY (Scribe), am scribing for, and in the presence of, Jean Paul Ruano.    Electronically signed by: Jose Nair (Scribe), 7/3/2022    IJean Paul personally performed the services described in this documentation, as scribed by Jose Nair in my presence, and it is both accurate and complete.    The note accurately reflects work and decisions made by me.  Jean Paul Ruano  7/3/2022  9:22 PM

## 2022-08-10 ENCOUNTER — GYNECOLOGY VISIT (OUTPATIENT)
Dept: OBGYN | Facility: CLINIC | Age: 27
End: 2022-08-10
Payer: COMMERCIAL

## 2022-08-10 VITALS — BODY MASS INDEX: 25.69 KG/M2 | WEIGHT: 145 LBS | DIASTOLIC BLOOD PRESSURE: 78 MMHG | SYSTOLIC BLOOD PRESSURE: 131 MMHG

## 2022-08-10 DIAGNOSIS — N93.9 ABNORMAL UTERINE BLEEDING: ICD-10-CM

## 2022-08-10 PROCEDURE — 99214 OFFICE O/P EST MOD 30 MIN: CPT | Performed by: OBSTETRICS & GYNECOLOGY

## 2022-08-10 ASSESSMENT — FIBROSIS 4 INDEX: FIB4 SCORE: 0.57

## 2022-08-10 NOTE — PROGRESS NOTES
Chief complaint;    Brenda Westbrook is a 27 y.o.  who presents complaining of several month history of heavy menstrual cycles lasting 6 to 7 days patient states she is bleeding through her clothing.  Patient has been taking low-dose OCPs on a cyclic basis.-Alesse /120.  Patient states she sometimes has 2 periods a month.  Patient has had bilateral salpingectomy with her previous  section.  Patient has had recent CBC in July which is normal.  Patient does have a history of migraine headaches without aura      Review of systems; denies fever chills abdominal pain, denies chest pain shortness of breath or urinary symptoms  Past medical history-  Past Medical History:   Diagnosis Date    Asthma, exercise induced     Bronchitis     Chlamydia     Oct 2011    Cold     02/6/15    Endometriosis     Head ache     Indigestion     Intractable migraine without aura and without status migrainosus 2017    Referral to neuro    Other specified symptom associated with female genital organs     endometriosis    Pain     pelvic    Pain 12    stomach    Psychiatric problem     depression    Sleep disturbances     Unspecified disorder of thyroid 2015    hyperactive in the past not taking any meds currently    Urinary bladder disorder     frequent uti's     Past surgical history-  Past Surgical History:   Procedure Laterality Date    PB KNEE SCOPE,MED/LAT MENISECTOMY Right 3/14/2022    Procedure: RIGHT KNEE ARTHROSCOPY, CHONDROPLASTY;  Surgeon: Rafat Alva M.D.;  Location: [facility];  Service: Orthopedics    MENISCAL REPAIR Right 3/14/2022    Procedure: REPAIR, MENISCUS, KNEE-lateral;  Surgeon: Rafat Alva M.D.;  Location: [facility];  Service: Orthopedics    REPEAT C SECTOIN WITH SALPINGECTOMY Bilateral 2021    Procedure:  SECTION, REPEAT, WITH SALPINGECTOMY;  Surgeon: Jorge Alvarenga M.D.;  Location: [facility]ERY [facility];  Service: Labor and  Delivery    REPEAT C SECTION N/A 9/15/2018    Procedure: REPEAT C SECTION;  Surgeon: Rachna Patterson M.D.;  Location: LABOR AND DELIVERY;  Service: Labor and Delivery    PRIMARY C SECTION Bilateral 6/11/2016    Procedure: PRIMARY C SECTION;  Surgeon: Trever Beckman M.D.;  Location: LABOR AND DELIVERY;  Service:     MANNY BY LAPAROSCOPY  3/12/2015    Procedure: MANNY BY LAPAROSCOPY;  Surgeon: Sean Mitchell M.D.;  Location: SURGERY [facility];  Service:     PELVISCOPY  2013    CYSTOSCOPY  8/30/2012    Performed by QUINTIN LOPES at SURGERY [facility]    PELVISCOPY  4/19/2012    Performed by CHRISTIANO PUGH at SURGERY SAME DAY [facility]    MYRINGOTOMY      STRABISMUS REPAIR      gerardo eyes    TONSILLECTOMY       Allergies-Ciprofloxacin, Codeine, Doxycycline, and Keflex  Medications-  Current Outpatient Medications on File Prior to Visit   Medication Sig Dispense Refill    escitalopram (LEXAPRO) 20 MG tablet       escitalopram (LEXAPRO) 20 MG tablet       BREO ELLIPTA 100-25 MCG/INH AEROSOL POWDER, BREATH ACTIVATED Inhale 1 Puff every day.      meloxicam (MOBIC) 15 MG tablet Take 1 Tablet by mouth every day. 30 Tablet 1    butalbital/apap/caffeine (FIORICET) -40 mg Tab Take 1 Tablet by mouth every four hours as needed for Migraine for up to 182 days. 30 Tablet 2    imipramine (TOFRANIL) 25 MG Tab Take 3 Tablets by mouth every evening for 150 days. 90 Tablet 4    levonorgestrel-ethinyl estradiol (AVIANE) 0.1-20 MG-MCG per tablet Take 1 Tablet by mouth every day. 28 Tablet 11    ondansetron (ZOFRAN) 4 MG Tab tablet Take 1 Tablet by mouth every four hours as needed. 8 Tablet 0    propranolol LA (INDERAL LA) 60 MG CAPSULE SR 24 HR 1 tab qAM for 2 weeks, then 2 tab qAM 60 Capsule 3    Prenatal MV-Min-Fe Fum-FA-DHA (PRENATAL 1 PO) Take  by mouth.       No current facility-administered medications on file prior to visit.     Social history-  Social History     Socioeconomic  History    Marital status:      Spouse name: Not on file    Number of children: Not on file    Years of education: Not on file    Highest education level: 12th grade   Occupational History    Not on file   Tobacco Use    Smoking status: Never    Smokeless tobacco: Never   Vaping Use    Vaping Use: Never used   Substance and Sexual Activity    Alcohol use: Yes     Comment: occasionally    Drug use: No    Sexual activity: Yes     Partners: Male     Birth control/protection: Condom, Female Sterilization   Other Topics Concern    Not on file   Social History Narrative    ** Merged History Encounter **          Social Determinants of Health     Financial Resource Strain: Not on file   Food Insecurity: Not on file   Transportation Needs: Not on file   Physical Activity: Not on file   Stress: Not on file   Social Connections: Not on file   Intimate Partner Violence: Not on file   Housing Stability: Not on file     Past Family History-no history of breast or ovarian cancer    Physical examination;  Alert and oriented x3  General a thin well-developed well-nourished female in no apparent distress  Vitals:    08/10/22 1250   BP: 131/78   BP Location: Right arm   Patient Position: Sitting   BP Cuff Size: Large adult   Weight: 65.8 kg (145 lb)     Skin is warm and dry  Neck-supple  HEENT-head-atraumatic, normocephalic, EOMI, PERRLA  Cardiovascular-regular rate and rhythm, normal S1-S2, no murmurs or gallops  Lungs-clear to auscultation bilaterally  Back-negative CVA tenderness  Abdomen-nondistended positive bowel sounds soft nontender no masses or hepatosplenomegaly  Pelvic examination;  External genitalia-no visible lesions   Vagina-no blood or discharge  Cervix-no gross lesions  Uterus-normal size and shape, nontender  Adnexa without mass or tenderness  Extremities without cyanosis clubbing or edema  Neurologic exam grossly intact    Impression;  Abnormal uterine bleeding    Plan;  Discussed various treatment options  including continuous OCPs and Depo-Provera, oral Provera, Aygestin, Mirena IUD risks benefits and alternatives of everything and was discussed in detail  Patient would like to use continuous oral contraceptives with the current pill that she is on.  If this does not work would recommend trying a slightly different pill formulation with continuous OCPs.  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      25  Minutes spent with the patient in face-to-face contact, greater than 50% of the time spent on counseling and coordination of care. All questions answered in detail.    Female chaperone present for entire examination and history

## 2022-08-31 ENCOUNTER — TELEPHONE (OUTPATIENT)
Dept: OBGYN | Facility: CLINIC | Age: 27
End: 2022-08-31
Payer: COMMERCIAL

## 2022-08-31 NOTE — TELEPHONE ENCOUNTER
Patient called in stating that she was told by  to change the way that she takes her OCP's.  recommended that the patient changes to taking the hormone pill only and skipping the placebo pill altogether. Patient states that because of this change the pharmacy will not fill her BC because they told her it's too early for a new pack. Informed the patient that this decision is most likely coming from her insurance not the pharmacy, so we might have to change the amount that is sent in at one time. Explained to the patient that I would send a message to the provider and his medical assistant requesting either a change in the original Rx or a change in the OCP that she is currently taking. Patient agreeable to this, but states that today is the last day of her hormone pills. Explained to the patient that I would send this message and do my best to have him get this in for her on time. No further questions, and message sent to the provider.

## 2022-09-19 ENCOUNTER — HOSPITAL ENCOUNTER (EMERGENCY)
Facility: MEDICAL CENTER | Age: 27
End: 2022-09-19
Attending: EMERGENCY MEDICINE
Payer: COMMERCIAL

## 2022-09-19 ENCOUNTER — APPOINTMENT (OUTPATIENT)
Dept: RADIOLOGY | Facility: MEDICAL CENTER | Age: 27
End: 2022-09-19
Attending: EMERGENCY MEDICINE
Payer: COMMERCIAL

## 2022-09-19 VITALS
BODY MASS INDEX: 26.33 KG/M2 | OXYGEN SATURATION: 97 % | RESPIRATION RATE: 20 BRPM | HEART RATE: 90 BPM | WEIGHT: 148.59 LBS | SYSTOLIC BLOOD PRESSURE: 102 MMHG | DIASTOLIC BLOOD PRESSURE: 69 MMHG | TEMPERATURE: 97.4 F | HEIGHT: 63 IN

## 2022-09-19 DIAGNOSIS — R10.84 ACUTE GENERALIZED ABDOMINAL PAIN: ICD-10-CM

## 2022-09-19 LAB
ALBUMIN SERPL BCP-MCNC: 4.3 G/DL (ref 3.2–4.9)
ALBUMIN/GLOB SERPL: 1.9 G/DL
ALP SERPL-CCNC: 57 U/L (ref 30–99)
ALT SERPL-CCNC: 12 U/L (ref 2–50)
ANION GAP SERPL CALC-SCNC: 13 MMOL/L (ref 7–16)
APPEARANCE UR: CLEAR
AST SERPL-CCNC: 12 U/L (ref 12–45)
BACTERIA #/AREA URNS HPF: ABNORMAL /HPF
BASOPHILS # BLD AUTO: 0.6 % (ref 0–1.8)
BASOPHILS # BLD: 0.03 K/UL (ref 0–0.12)
BILIRUB SERPL-MCNC: <0.2 MG/DL (ref 0.1–1.5)
BILIRUB UR QL STRIP.AUTO: NEGATIVE
BUN SERPL-MCNC: 8 MG/DL (ref 8–22)
CALCIUM SERPL-MCNC: 8.6 MG/DL (ref 8.5–10.5)
CHLORIDE SERPL-SCNC: 105 MMOL/L (ref 96–112)
CO2 SERPL-SCNC: 22 MMOL/L (ref 20–33)
COLOR UR: YELLOW
CREAT SERPL-MCNC: 0.7 MG/DL (ref 0.5–1.4)
EOSINOPHIL # BLD AUTO: 0.03 K/UL (ref 0–0.51)
EOSINOPHIL NFR BLD: 0.6 % (ref 0–6.9)
EPI CELLS #/AREA URNS HPF: ABNORMAL /HPF
ERYTHROCYTE [DISTWIDTH] IN BLOOD BY AUTOMATED COUNT: 48.6 FL (ref 35.9–50)
GFR SERPLBLD CREATININE-BSD FMLA CKD-EPI: 120 ML/MIN/1.73 M 2
GLOBULIN SER CALC-MCNC: 2.3 G/DL (ref 1.9–3.5)
GLUCOSE SERPL-MCNC: 96 MG/DL (ref 65–99)
GLUCOSE UR STRIP.AUTO-MCNC: NEGATIVE MG/DL
HCG SERPL QL: NEGATIVE
HCT VFR BLD AUTO: 36.3 % (ref 37–47)
HGB BLD-MCNC: 11.5 G/DL (ref 12–16)
HYALINE CASTS #/AREA URNS LPF: ABNORMAL /LPF
IMM GRANULOCYTES # BLD AUTO: 0.02 K/UL (ref 0–0.11)
IMM GRANULOCYTES NFR BLD AUTO: 0.4 % (ref 0–0.9)
KETONES UR STRIP.AUTO-MCNC: NEGATIVE MG/DL
LEUKOCYTE ESTERASE UR QL STRIP.AUTO: ABNORMAL
LIPASE SERPL-CCNC: 32 U/L (ref 11–82)
LYMPHOCYTES # BLD AUTO: 1.51 K/UL (ref 1–4.8)
LYMPHOCYTES NFR BLD: 32 % (ref 22–41)
MCH RBC QN AUTO: 27.5 PG (ref 27–33)
MCHC RBC AUTO-ENTMCNC: 31.7 G/DL (ref 33.6–35)
MCV RBC AUTO: 86.8 FL (ref 81.4–97.8)
MICRO URNS: ABNORMAL
MONOCYTES # BLD AUTO: 0.35 K/UL (ref 0–0.85)
MONOCYTES NFR BLD AUTO: 7.4 % (ref 0–13.4)
NEUTROPHILS # BLD AUTO: 2.78 K/UL (ref 2–7.15)
NEUTROPHILS NFR BLD: 59 % (ref 44–72)
NITRITE UR QL STRIP.AUTO: NEGATIVE
NRBC # BLD AUTO: 0 K/UL
NRBC BLD-RTO: 0 /100 WBC
PH UR STRIP.AUTO: 6 [PH] (ref 5–8)
PLATELET # BLD AUTO: 265 K/UL (ref 164–446)
PMV BLD AUTO: 9.2 FL (ref 9–12.9)
POTASSIUM SERPL-SCNC: 3.3 MMOL/L (ref 3.6–5.5)
PROT SERPL-MCNC: 6.6 G/DL (ref 6–8.2)
PROT UR QL STRIP: NEGATIVE MG/DL
RBC # BLD AUTO: 4.18 M/UL (ref 4.2–5.4)
RBC # URNS HPF: ABNORMAL /HPF
RBC UR QL AUTO: NEGATIVE
SODIUM SERPL-SCNC: 140 MMOL/L (ref 135–145)
SP GR UR STRIP.AUTO: 1
UROBILINOGEN UR STRIP.AUTO-MCNC: 0.2 MG/DL
WBC # BLD AUTO: 4.7 K/UL (ref 4.8–10.8)
WBC #/AREA URNS HPF: ABNORMAL /HPF

## 2022-09-19 PROCEDURE — 74177 CT ABD & PELVIS W/CONTRAST: CPT

## 2022-09-19 PROCEDURE — 87591 N.GONORRHOEAE DNA AMP PROB: CPT

## 2022-09-19 PROCEDURE — 36415 COLL VENOUS BLD VENIPUNCTURE: CPT

## 2022-09-19 PROCEDURE — 80053 COMPREHEN METABOLIC PANEL: CPT

## 2022-09-19 PROCEDURE — 87491 CHLMYD TRACH DNA AMP PROBE: CPT

## 2022-09-19 PROCEDURE — A9270 NON-COVERED ITEM OR SERVICE: HCPCS | Performed by: EMERGENCY MEDICINE

## 2022-09-19 PROCEDURE — 700102 HCHG RX REV CODE 250 W/ 637 OVERRIDE(OP): Performed by: EMERGENCY MEDICINE

## 2022-09-19 PROCEDURE — 700111 HCHG RX REV CODE 636 W/ 250 OVERRIDE (IP): Performed by: EMERGENCY MEDICINE

## 2022-09-19 PROCEDURE — 700105 HCHG RX REV CODE 258: Performed by: EMERGENCY MEDICINE

## 2022-09-19 PROCEDURE — 84703 CHORIONIC GONADOTROPIN ASSAY: CPT

## 2022-09-19 PROCEDURE — 99285 EMERGENCY DEPT VISIT HI MDM: CPT

## 2022-09-19 PROCEDURE — 83690 ASSAY OF LIPASE: CPT

## 2022-09-19 PROCEDURE — 85025 COMPLETE CBC W/AUTO DIFF WBC: CPT

## 2022-09-19 PROCEDURE — 96375 TX/PRO/DX INJ NEW DRUG ADDON: CPT

## 2022-09-19 PROCEDURE — 81001 URINALYSIS AUTO W/SCOPE: CPT

## 2022-09-19 PROCEDURE — 96374 THER/PROPH/DIAG INJ IV PUSH: CPT

## 2022-09-19 PROCEDURE — 700117 HCHG RX CONTRAST REV CODE 255: Performed by: EMERGENCY MEDICINE

## 2022-09-19 RX ORDER — ONDANSETRON 2 MG/ML
4 INJECTION INTRAMUSCULAR; INTRAVENOUS ONCE
Status: COMPLETED | OUTPATIENT
Start: 2022-09-19 | End: 2022-09-19

## 2022-09-19 RX ORDER — SODIUM CHLORIDE 9 MG/ML
1000 INJECTION, SOLUTION INTRAVENOUS ONCE
Status: COMPLETED | OUTPATIENT
Start: 2022-09-19 | End: 2022-09-19

## 2022-09-19 RX ORDER — AZITHROMYCIN 250 MG/1
1000 TABLET, FILM COATED ORAL ONCE
Status: COMPLETED | OUTPATIENT
Start: 2022-09-19 | End: 2022-09-19

## 2022-09-19 RX ORDER — KETOROLAC TROMETHAMINE 30 MG/ML
30 INJECTION, SOLUTION INTRAMUSCULAR; INTRAVENOUS ONCE
Status: COMPLETED | OUTPATIENT
Start: 2022-09-19 | End: 2022-09-19

## 2022-09-19 RX ORDER — MORPHINE SULFATE 4 MG/ML
4 INJECTION INTRAVENOUS ONCE
Status: COMPLETED | OUTPATIENT
Start: 2022-09-19 | End: 2022-09-19

## 2022-09-19 RX ORDER — NAPROXEN 375 MG/1
375 TABLET ORAL 2 TIMES DAILY WITH MEALS
Qty: 20 TABLET | Refills: 0 | Status: SHIPPED | OUTPATIENT
Start: 2022-09-19 | End: 2022-10-14

## 2022-09-19 RX ADMIN — SODIUM CHLORIDE 1000 ML: 9 INJECTION, SOLUTION INTRAVENOUS at 16:05

## 2022-09-19 RX ADMIN — ONDANSETRON 4 MG: 2 INJECTION INTRAMUSCULAR; INTRAVENOUS at 16:09

## 2022-09-19 RX ADMIN — MORPHINE SULFATE 4 MG: 4 INJECTION INTRAVENOUS at 16:07

## 2022-09-19 RX ADMIN — IOHEXOL 100 ML: 350 INJECTION, SOLUTION INTRAVENOUS at 16:22

## 2022-09-19 RX ADMIN — AZITHROMYCIN MONOHYDRATE 1000 MG: 250 TABLET ORAL at 18:24

## 2022-09-19 RX ADMIN — KETOROLAC TROMETHAMINE 30 MG: 30 INJECTION, SOLUTION INTRAMUSCULAR; INTRAVENOUS at 17:14

## 2022-09-19 ASSESSMENT — PAIN DESCRIPTION - PAIN TYPE: TYPE: ACUTE PAIN

## 2022-09-19 ASSESSMENT — LIFESTYLE VARIABLES
DOES PATIENT WANT TO STOP DRINKING: NO
DO YOU DRINK ALCOHOL: NO

## 2022-09-19 ASSESSMENT — FIBROSIS 4 INDEX: FIB4 SCORE: 0.57

## 2022-09-19 NOTE — ED PROVIDER NOTES
ED Provider  Scribed for David Thomas D.O. by Jose Nair. 9/19/2022  2:33 PM    Means of arrival:Walk in  History obtained from:Patient  History limited by: None    CHIEF COMPLAINT  Chief Complaint   Patient presents with    Abdominal Pain     X 1 week, rlq pain, constant pain     N/V       HPI  Brenda Westbrook is a 27 y.o. female who presents for evaluation of abdominal pain. Patient states the abdominal pain started one week ago initially to the area of her belly button. It then went to the right lower quadrant. States the pain is now diffuse. Patient states the pain has been constant and has been progressively worsening. It is now an 8/10 in severity. She reports nausea. States she has had temperatures of 99 F and 100 F, but she otherwise denies any recorded fevers today. States she still has her appendix. She no longer has her gallbladder. Denies chance of pregnancy. States she is on medication for migraines, and she gets a continuous dose of birth control medication.    REVIEW OF SYSTEMS  See HPI for further details. All other systems are negative.     PAST MEDICAL HISTORY   has a past medical history of Asthma, exercise induced, Bronchitis, Chlamydia, Cold, Endometriosis, Head ache, Indigestion, Intractable migraine without aura and without status migrainosus (4/19/2017), Other specified symptom associated with female genital organs, Pain, Pain (8/23/12), Psychiatric problem, Sleep disturbances, Unspecified disorder of thyroid (2015), and Urinary bladder disorder.    SOCIAL HISTORY  Social History     Tobacco Use    Smoking status: Never    Smokeless tobacco: Never   Vaping Use    Vaping Use: Never used   Substance and Sexual Activity    Alcohol use: Yes     Comment: occasionally    Drug use: No    Sexual activity: Yes     Partners: Male     Birth control/protection: Condom, Female Sterilization       SURGICAL HISTORY   has a past surgical history that includes tonsillectomy; strabismus  "repair; myringotomy; cystoscopy (8/30/2012); pelviscopy (4/19/2012); pelviscopy (2013); joshua by laparoscopy (3/12/2015); primary c section (Bilateral, 6/11/2016); repeat c section (N/A, 9/15/2018); repeat c sectoin with salpingectomy (Bilateral, 5/9/2021); knee scope,med/lat menisectomy (Right, 3/14/2022); and meniscal repair (Right, 3/14/2022).    CURRENT MEDICATIONS  Home Medications    **Home medications have not yet been reviewed for this encounter**         ALLERGIES  Allergies   Allergen Reactions    Ciprofloxacin Vomiting    Codeine Hives     Tolerates oxycodone, morphine, and hydromorphone    Doxycycline Itching and Vomiting    Keflex Itching       PHYSICAL EXAM  VITAL SIGNS: /69   Pulse 90   Temp 36.3 °C (97.4 °F) (Temporal)   Resp 20   Ht 1.6 m (5' 3\")   Wt 67.4 kg (148 lb 9.4 oz)   SpO2 97%   BMI 26.32 kg/m²   Constitutional: Alert in no apparent distress.  HENT: No signs of trauma, mucous membranes are moist  Eyes: Conjunctiva normal, Non-icteric.   Neck: Normal range of motion, No tenderness, Supple.  Lymphatic: No lymphadenopathy noted.   Cardiovascular: Regular rate and rhythm, no murmurs.   Thorax & Lungs: Normal breath sounds, No respiratory distress, No wheezing, No chest tenderness.   Abdomen: Bowel sounds normal, Soft, Tenderness to right lower quadrant, No masses, No pulsatile masses. No peritoneal signs.  Pelvic: Cervical os is closed, there is purulent discharge from the os on speculum exam. On manual exam, there is cervical motion tenderness. Female nurse chaperone was in room during exam.  Skin: Warm, Dry, normal color.   Back: No bony tenderness, No CVA tenderness.   Extremities: No edema, No tenderness, No cyanosis  Musculoskeletal: Good range of motion in all major joints. No tenderness to palpation or major deformities noted.   Neurologic: Alert and oriented x4, Normal motor function, Normal sensory function, No focal deficits noted.   Psychiatric: Affect normal, Judgment " normal, Mood normal.         DIAGNOSTIC STUDIES / PROCEDURES    LABS  Results for orders placed or performed during the hospital encounter of 09/19/22   CBC WITH DIFFERENTIAL   Result Value Ref Range    WBC 4.7 (L) 4.8 - 10.8 K/uL    RBC 4.18 (L) 4.20 - 5.40 M/uL    Hemoglobin 11.5 (L) 12.0 - 16.0 g/dL    Hematocrit 36.3 (L) 37.0 - 47.0 %    MCV 86.8 81.4 - 97.8 fL    MCH 27.5 27.0 - 33.0 pg    MCHC 31.7 (L) 33.6 - 35.0 g/dL    RDW 48.6 35.9 - 50.0 fL    Platelet Count 265 164 - 446 K/uL    MPV 9.2 9.0 - 12.9 fL    Neutrophils-Polys 59.00 44.00 - 72.00 %    Lymphocytes 32.00 22.00 - 41.00 %    Monocytes 7.40 0.00 - 13.40 %    Eosinophils 0.60 0.00 - 6.90 %    Basophils 0.60 0.00 - 1.80 %    Immature Granulocytes 0.40 0.00 - 0.90 %    Nucleated RBC 0.00 /100 WBC    Neutrophils (Absolute) 2.78 2.00 - 7.15 K/uL    Lymphs (Absolute) 1.51 1.00 - 4.80 K/uL    Monos (Absolute) 0.35 0.00 - 0.85 K/uL    Eos (Absolute) 0.03 0.00 - 0.51 K/uL    Baso (Absolute) 0.03 0.00 - 0.12 K/uL    Immature Granulocytes (abs) 0.02 0.00 - 0.11 K/uL    NRBC (Absolute) 0.00 K/uL   COMP METABOLIC PANEL   Result Value Ref Range    Sodium 140 135 - 145 mmol/L    Potassium 3.3 (L) 3.6 - 5.5 mmol/L    Chloride 105 96 - 112 mmol/L    Co2 22 20 - 33 mmol/L    Anion Gap 13.0 7.0 - 16.0    Glucose 96 65 - 99 mg/dL    Bun 8 8 - 22 mg/dL    Creatinine 0.70 0.50 - 1.40 mg/dL    Calcium 8.6 8.5 - 10.5 mg/dL    AST(SGOT) 12 12 - 45 U/L    ALT(SGPT) 12 2 - 50 U/L    Alkaline Phosphatase 57 30 - 99 U/L    Total Bilirubin <0.2 0.1 - 1.5 mg/dL    Albumin 4.3 3.2 - 4.9 g/dL    Total Protein 6.6 6.0 - 8.2 g/dL    Globulin 2.3 1.9 - 3.5 g/dL    A-G Ratio 1.9 g/dL   LIPASE   Result Value Ref Range    Lipase 32 11 - 82 U/L   HCG QUAL SERUM   Result Value Ref Range    Beta-Hcg Qualitative Serum Negative Negative   URINALYSIS,CULTURE IF INDICATED    Specimen: Blood   Result Value Ref Range    Color Yellow     Character Clear     Specific Gravity 1.004 <1.035    Ph 6.0  5.0 - 8.0    Glucose Negative Negative mg/dL    Ketones Negative Negative mg/dL    Protein Negative Negative mg/dL    Bilirubin Negative Negative    Urobilinogen, Urine 0.2 Negative    Nitrite Negative Negative    Leukocyte Esterase Trace (A) Negative    Occult Blood Negative Negative    Micro Urine Req Microscopic    URINE MICROSCOPIC (W/UA)   Result Value Ref Range    WBC 0-2 /hpf    RBC 0-2 /hpf    Bacteria Rare (A) None /hpf    Epithelial Cells Few /hpf    Hyaline Cast 0-2 /lpf   ESTIMATED GFR   Result Value Ref Range    GFR (CKD-EPI) 120 >60 mL/min/1.73 m 2   Chlamydia/GC, PCR (Genital/Anal swab)   Result Value Ref Range    Source Endo/Cervical       All labs reviewed by me.    RADIOLOGY  CT-ABDOMEN-PELVIS WITH   Final Result      No acute inflammation in the abdomen or pelvis. Normal appendix.        The radiologist's interpretations of all radiological studies have been reviewed by me.    Films have been independently by me      COURSE  Pertinent Labs & Imaging studies reviewed. (See chart for details)    2:33 PM - Patient seen and examined at bedside. Discussed plan of care. The patient will be medicated with zofran injection 4 mg, morphine injection 4 mg. Ordered for CT abdomen-pelvis, CBC with differential, CMP, lipase, HCG qual serum, urinalysis to evaluate her symptoms.    5:49 PM Patient reevaluated at bedside. The pain was unchanged after treatment with morphine. Patient significantly improved after treatment with Toradol. Discussed the need for pelvic exam to evaluate for infection, and patient is agreeable.    HYDRATION: Based on the patient's presentation of Other nausea the patient was given IV fluids. IV Hydration was used because oral hydration was not adequate alone. Upon recheck following hydration, the patient was improved.     MEDICAL DECISION MAKING  This is a 27 y.o. female who presents with complaints of severe abdominal pain.  Morphine was given the patient had significant pain.  CT was  done and shows no inflammatory or obstructive process there is no appendicitis or ureterolithiasis.    On pelvic exam there is mucus purulent which may be related to infection.  Swabs been done and are pending.    The patient does have a history of endometriosis and she is on birth control pills and not getting menses, I do not know if her pain may be related to that.    There is no signs of emergent cause for this abdominal pain she was started on antibiotics here pending results of swabs.     The patient will return for new or worsening symptoms and is stable at the time of discharge.    The patient is referred to a primary physician for blood pressure management, diabetic screening, and for all other preventative health concerns.    DISPOSITION:  Patient will be discharged home in stable condition.    FOLLOW UP:  Sonia Colin, SHEY.N.P.  [address] [place] [id number]  [phone]          OUTPATIENT MEDICATIONS:  Discharge Medication List as of 9/19/2022  6:31 PM        START taking these medications    Details   naproxen (NAPROSYN) 375 MG Tab Take 1 Tablet by mouth 2 times a day with meals., Disp-20 Tablet, R-0, Normal              FINAL IMPRESSION  1. Acute generalized abdominal pain         Jose NY (Scribe), am scribing for, and in the presence of, David Thomas D.O..    Electronically signed by: Jose Nair (Scribe), 9/19/2022    David NY D.O. personally performed the services described in this documentation, as scribed by Jose Nair in my presence, and it is both accurate and complete.    The note accurately reflects work and decisions made by me.  David Thomas D.O.  9/19/2022  8:25 PM

## 2022-09-19 NOTE — ED NOTES
Agree with triage note. Also c/o diarrhea, weakness, fatigue. Denies fever/chills. Provided with gown and blanket.

## 2022-09-19 NOTE — ED NOTES
PIV inserted. Blood drawn and sent to lab. Urine specimen obtained and sent to lab. ERP at bedside.

## 2022-09-19 NOTE — ED TRIAGE NOTES
.  Chief Complaint   Patient presents with    Abdominal Pain     X 1 week, rlq pain, constant pain     N/V     Ambulated to triage with s/o reports pain x 1 week constant. Pain was near umbilici now rlq. N/v x 5 days

## 2022-09-20 LAB
C TRACH DNA GENITAL QL NAA+PROBE: NEGATIVE
N GONORRHOEA DNA GENITAL QL NAA+PROBE: NEGATIVE
SPECIMEN SOURCE: NORMAL

## 2022-09-20 NOTE — ED NOTES
This RN at bedside with ERP for pelvic examination. Pt tolerated procedure without pain or distress. Swabs obtained and sent to lab.

## 2022-09-20 NOTE — DISCHARGE INSTRUCTIONS
CT scan shows no sign of appendicitis, obstruction infection or inflammation.  This may be related to your endometriosis, or it may be related to a pelvic infection.  You been placed on an antibiotic to treat a possible pelvic infection, swabs and cultures will be pending.  If anything is positive you will be notified.    Use the anti-inflammatory pain medication as prescribed to assist with discomfort.  Return if your symptoms change or worsen.

## 2022-09-20 NOTE — ED NOTES
Received orders for DC. Educated regarding f/u with PCP and prescription for pain medication. VSS. Ambulatory with a safe and steady gait to lobby. All questions addressed.

## 2022-09-22 ENCOUNTER — TELEPHONE (OUTPATIENT)
Dept: NEUROLOGY | Facility: MEDICAL CENTER | Age: 27
End: 2022-09-22
Payer: COMMERCIAL

## 2022-09-23 ENCOUNTER — OFFICE VISIT (OUTPATIENT)
Dept: BEHAVIORAL HEALTH | Facility: CLINIC | Age: 27
End: 2022-09-23
Payer: COMMERCIAL

## 2022-09-23 VITALS
HEIGHT: 63 IN | HEART RATE: 100 BPM | WEIGHT: 149 LBS | SYSTOLIC BLOOD PRESSURE: 110 MMHG | BODY MASS INDEX: 26.4 KG/M2 | DIASTOLIC BLOOD PRESSURE: 74 MMHG

## 2022-09-23 DIAGNOSIS — F33.1 MODERATE EPISODE OF RECURRENT MAJOR DEPRESSIVE DISORDER (HCC): ICD-10-CM

## 2022-09-23 PROCEDURE — 99204 OFFICE O/P NEW MOD 45 MIN: CPT | Mod: GC | Performed by: PSYCHIATRY & NEUROLOGY

## 2022-09-23 RX ORDER — BUPROPION HYDROCHLORIDE 150 MG/1
150 TABLET ORAL EVERY MORNING
Qty: 30 TABLET | Refills: 1 | Status: SHIPPED | OUTPATIENT
Start: 2022-09-23 | End: 2022-11-02

## 2022-09-23 ASSESSMENT — FIBROSIS 4 INDEX: FIB4 SCORE: 0.37
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
## 2022-10-09 DIAGNOSIS — G43.009 MIGRAINE WITHOUT AURA AND WITHOUT STATUS MIGRAINOSUS, NOT INTRACTABLE: ICD-10-CM

## 2022-10-10 RX ORDER — BUTALBITAL, ACETAMINOPHEN AND CAFFEINE 50; 325; 40 MG/1; MG/1; MG/1
1 TABLET ORAL EVERY 4 HOURS PRN
Qty: 30 TABLET | Refills: 0 | Status: SHIPPED | OUTPATIENT
Start: 2022-10-10 | End: 2022-12-02 | Stop reason: SDUPTHER

## 2022-10-13 PROBLEM — S83.271A COMPLEX TEAR OF LATERAL MENISCUS OF RIGHT KNEE AS CURRENT INJURY: Status: ACTIVE | Noted: 2022-10-13

## 2022-11-02 ENCOUNTER — TELEMEDICINE (OUTPATIENT)
Dept: BEHAVIORAL HEALTH | Facility: CLINIC | Age: 27
End: 2022-11-02
Payer: COMMERCIAL

## 2022-11-02 DIAGNOSIS — F33.1 MODERATE EPISODE OF RECURRENT MAJOR DEPRESSIVE DISORDER (HCC): ICD-10-CM

## 2022-11-02 PROCEDURE — 99214 OFFICE O/P EST MOD 30 MIN: CPT | Mod: 95,GC | Performed by: PSYCHIATRY & NEUROLOGY

## 2022-11-02 RX ORDER — BUPROPION HYDROCHLORIDE 300 MG/1
300 TABLET ORAL EVERY MORNING
Qty: 30 TABLET | Refills: 1 | Status: SHIPPED | OUTPATIENT
Start: 2022-11-02 | End: 2022-12-02 | Stop reason: SDUPTHER
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
## 2022-11-18 ENCOUNTER — OFFICE VISIT (OUTPATIENT)
Dept: NEUROLOGY | Facility: MEDICAL CENTER | Age: 27
End: 2022-11-18
Attending: PSYCHIATRY & NEUROLOGY
Payer: COMMERCIAL

## 2022-11-18 VITALS
SYSTOLIC BLOOD PRESSURE: 100 MMHG | RESPIRATION RATE: 15 BRPM | TEMPERATURE: 97.7 F | HEIGHT: 63 IN | WEIGHT: 149.47 LBS | DIASTOLIC BLOOD PRESSURE: 62 MMHG | OXYGEN SATURATION: 96 % | HEART RATE: 127 BPM | BODY MASS INDEX: 26.48 KG/M2

## 2022-11-18 DIAGNOSIS — G43.009 MIGRAINE WITHOUT AURA AND WITHOUT STATUS MIGRAINOSUS, NOT INTRACTABLE: ICD-10-CM

## 2022-11-18 PROBLEM — G43.019 INTRACTABLE MIGRAINE WITHOUT AURA AND WITHOUT STATUS MIGRAINOSUS: Status: ACTIVE | Noted: 2017-04-19

## 2022-11-18 PROCEDURE — 99213 OFFICE O/P EST LOW 20 MIN: CPT | Performed by: PSYCHIATRY & NEUROLOGY

## 2022-11-18 PROCEDURE — 99212 OFFICE O/P EST SF 10 MIN: CPT | Performed by: PSYCHIATRY & NEUROLOGY

## 2022-11-18 RX ORDER — IMIPRAMINE HCL 25 MG
75 TABLET ORAL NIGHTLY
Qty: 270 TABLET | Refills: 3 | Status: SHIPPED | OUTPATIENT
Start: 2022-11-18 | End: 2023-05-12 | Stop reason: CLARIF

## 2022-11-18 RX ORDER — CHLORHEXIDINE GLUCONATE ORAL RINSE 1.2 MG/ML
SOLUTION DENTAL
Status: ON HOLD | COMMUNITY
Start: 2022-09-16 | End: 2023-04-12

## 2022-11-18 RX ORDER — BETAMETHASONE DIPROPIONATE 0.05 %
GEL (GRAM) TOPICAL PRN
COMMUNITY
Start: 2022-10-18

## 2022-11-18 ASSESSMENT — FIBROSIS 4 INDEX: FIB4 SCORE: 0.37

## 2022-11-18 ASSESSMENT — ENCOUNTER SYMPTOMS: HEADACHES: 1

## 2022-11-18 NOTE — PROGRESS NOTES
"Subjective     Brenda Westbrook is a 27 y.o. female who presents for follow-up, with a history of migraine without aura, and whose headaches have come under better control on imipramine.     HPI    Since last seen, she had noted the headaches increasing related to elevated stress, her young daughter has been having difficulties with failure to thrive, body weight had been quite low.  Since then her daughter has begun to eat more regularly, there is a dietitian on board.  Now her stress has to do with a recent redo of her right knee surgery for a lateral meniscal tear.    The headaches actually have come under better control.  Given Qulipta 60 mg tablets, after 2 months these provided no benefit.  We had stopped the Inderal as it was losing its efficacy prior to that.  Now on imipramine 75 mg nightly, she tolerates the drug without any anticholinergic side effects, and she has had maybe 2 headaches warranting treatment in the last month.  She is also using Advil migraine first, about half the time she does not need to reach for her Fioricet.  Overall she is much happier.  She is actually moving around without headaches most of the time.    Medical, surgical and family histories are reviewed, there are no new drug allergies.  She is on imipramine 75 mg nightly, has Norco to use as needed for her knee surgery, Fioricet as needed, Motrin migraine as needed, Wellbutrin XL, Proventil and Symbicort inhalers, birth control, Myfembree and Zofran.    Review of Systems   Neurological:  Positive for headaches.   All other systems reviewed and are negative.    Objective     /62 (BP Location: Right arm, Patient Position: Sitting, BP Cuff Size: Adult)   Pulse (!) 127   Temp 36.5 °C (97.7 °F) (Temporal)   Resp 15   Ht 1.6 m (5' 3\")   Wt 67.8 kg (149 lb 7.6 oz)   SpO2 96%   BMI 26.48 kg/m²      Physical Exam    She appears in no acute distress.  Vital signs are stable.  There is no malar rash or jaw claudication.  " The neck is supple.  Cardiac evaluation is unremarkable.  The right knee is in a bandage and knee brace, examination of the right lower extremity thus limited.     Neurological Exam    In quick and cursory fashion, with observation, mental status, cranial nerve, musculoskeletal and coordination evaluations are intact.    Assessment & Plan     1. Migraine without aura and without status migrainosus, not intractable  She is doing very well, we will continue imipramine 75 mg nightly.  She was reassured the drug is safe to take Over the long-term.  She is not overusing the Fioricet, I encouraged her to use the Advil migraine first, I suspect this will provide enough benefit moving forwards, the longer she is on the imipramine.  We can follow-up in 6 months.    - imipramine (TOFRANIL) 25 MG Tab; Take 3 Tablets by mouth every evening for 360 days.  Dispense: 270 Tablet; Refill: 3    Time: 20 minutes in total spent on patient care including precharting, record review, discussion with healthcare staff and documentation.  This includes face-to-face time for exam, review, discussion, as well as counseling and coordinating care.

## 2022-12-02 ENCOUNTER — TELEMEDICINE (OUTPATIENT)
Dept: BEHAVIORAL HEALTH | Facility: CLINIC | Age: 27
End: 2022-12-02
Payer: COMMERCIAL

## 2022-12-02 DIAGNOSIS — G43.009 MIGRAINE WITHOUT AURA AND WITHOUT STATUS MIGRAINOSUS, NOT INTRACTABLE: ICD-10-CM

## 2022-12-02 DIAGNOSIS — F33.1 MODERATE EPISODE OF RECURRENT MAJOR DEPRESSIVE DISORDER (HCC): ICD-10-CM

## 2022-12-02 PROCEDURE — 99999 PR NO CHARGE: CPT | Performed by: PSYCHIATRY & NEUROLOGY

## 2022-12-02 RX ORDER — BUTALBITAL, ACETAMINOPHEN AND CAFFEINE 50; 325; 40 MG/1; MG/1; MG/1
1 TABLET ORAL EVERY 4 HOURS PRN
Qty: 30 TABLET | Refills: 0 | Status: SHIPPED | OUTPATIENT
Start: 2022-12-02 | End: 2023-02-08 | Stop reason: SDUPTHER

## 2022-12-02 RX ORDER — BUPROPION HYDROCHLORIDE 300 MG/1
300 TABLET ORAL EVERY MORNING
Qty: 90 TABLET | Refills: 0 | Status: SHIPPED | OUTPATIENT
Start: 2022-12-02 | End: 2023-02-15 | Stop reason: SDUPTHER
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
## 2023-02-08 DIAGNOSIS — G43.009 MIGRAINE WITHOUT AURA AND WITHOUT STATUS MIGRAINOSUS, NOT INTRACTABLE: ICD-10-CM

## 2023-02-09 RX ORDER — BUTALBITAL, ACETAMINOPHEN AND CAFFEINE 50; 325; 40 MG/1; MG/1; MG/1
1 TABLET ORAL EVERY 4 HOURS PRN
Qty: 30 TABLET | Refills: 0 | Status: ON HOLD | OUTPATIENT
Start: 2023-02-09 | End: 2023-04-12

## 2023-02-09 NOTE — TELEPHONE ENCOUNTER
Received request via: Patient    Was the patient seen in the last year in this department? Yes    Does the patient have an active prescription (recently filled or refills available) for medication(s) requested? Yes.     Does the patient have [facility] and need 100 day supply (blood pressure, diabetes and cholesterol meds only)? Medication is not for cholesterol, blood pressure or diabetes

## 2023-02-15 ENCOUNTER — APPOINTMENT (OUTPATIENT)
Dept: BEHAVIORAL HEALTH | Facility: CLINIC | Age: 28
End: 2023-02-15
Payer: COMMERCIAL

## 2023-02-15 DIAGNOSIS — F33.1 MODERATE EPISODE OF RECURRENT MAJOR DEPRESSIVE DISORDER (HCC): ICD-10-CM

## 2023-02-15 RX ORDER — BUPROPION HYDROCHLORIDE 300 MG/1
300 TABLET ORAL EVERY MORNING
Qty: 90 TABLET | Refills: 0 | Status: SHIPPED | OUTPATIENT
Start: 2023-02-15 | End: 2023-05-09 | Stop reason: SDUPTHER

## 2023-02-21 ENCOUNTER — TELEMEDICINE (OUTPATIENT)
Dept: BEHAVIORAL HEALTH | Facility: CLINIC | Age: 28
End: 2023-02-21
Payer: COMMERCIAL

## 2023-02-21 DIAGNOSIS — F31.81 BIPOLAR 2 DISORDER (HCC): ICD-10-CM

## 2023-02-21 PROCEDURE — 99213 OFFICE O/P EST LOW 20 MIN: CPT | Mod: 95,GC | Performed by: PSYCHIATRY & NEUROLOGY
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
## 2023-03-17 ENCOUNTER — APPOINTMENT (OUTPATIENT)
Dept: ADMISSIONS | Facility: MEDICAL CENTER | Age: 28
End: 2023-03-17
Attending: OBSTETRICS & GYNECOLOGY
Payer: COMMERCIAL

## 2023-03-17 ENCOUNTER — HOSPITAL ENCOUNTER (OUTPATIENT)
Dept: LAB | Facility: MEDICAL CENTER | Age: 28
End: 2023-03-17
Attending: STUDENT IN AN ORGANIZED HEALTH CARE EDUCATION/TRAINING PROGRAM
Payer: COMMERCIAL

## 2023-03-17 DIAGNOSIS — F31.81 BIPOLAR 2 DISORDER (HCC): ICD-10-CM

## 2023-03-17 LAB
ALBUMIN SERPL BCP-MCNC: 4.4 G/DL (ref 3.2–4.9)
ALBUMIN/GLOB SERPL: 1.5 G/DL
ALP SERPL-CCNC: 65 U/L (ref 30–99)
ALT SERPL-CCNC: 12 U/L (ref 2–50)
ANION GAP SERPL CALC-SCNC: 11 MMOL/L (ref 7–16)
AST SERPL-CCNC: 9 U/L (ref 12–45)
B-HCG SERPL-ACNC: <1 MIU/ML (ref 0–5)
BASOPHILS # BLD AUTO: 0.8 % (ref 0–1.8)
BASOPHILS # BLD: 0.05 K/UL (ref 0–0.12)
BILIRUB SERPL-MCNC: 0.2 MG/DL (ref 0.1–1.5)
BUN SERPL-MCNC: 10 MG/DL (ref 8–22)
CALCIUM ALBUM COR SERPL-MCNC: 9.2 MG/DL (ref 8.5–10.5)
CALCIUM SERPL-MCNC: 9.5 MG/DL (ref 8.5–10.5)
CHLORIDE SERPL-SCNC: 104 MMOL/L (ref 96–112)
CO2 SERPL-SCNC: 22 MMOL/L (ref 20–33)
CREAT SERPL-MCNC: 0.87 MG/DL (ref 0.5–1.4)
EOSINOPHIL # BLD AUTO: 0.08 K/UL (ref 0–0.51)
EOSINOPHIL NFR BLD: 1.2 % (ref 0–6.9)
ERYTHROCYTE [DISTWIDTH] IN BLOOD BY AUTOMATED COUNT: 54.9 FL (ref 35.9–50)
GFR SERPLBLD CREATININE-BSD FMLA CKD-EPI: 92 ML/MIN/1.73 M 2
GLOBULIN SER CALC-MCNC: 2.9 G/DL (ref 1.9–3.5)
GLUCOSE SERPL-MCNC: 100 MG/DL (ref 65–99)
HCT VFR BLD AUTO: 38.7 % (ref 37–47)
HGB BLD-MCNC: 12.5 G/DL (ref 12–16)
IMM GRANULOCYTES # BLD AUTO: 0.02 K/UL (ref 0–0.11)
IMM GRANULOCYTES NFR BLD AUTO: 0.3 % (ref 0–0.9)
LYMPHOCYTES # BLD AUTO: 2.02 K/UL (ref 1–4.8)
LYMPHOCYTES NFR BLD: 30.6 % (ref 22–41)
MCH RBC QN AUTO: 28.7 PG (ref 27–33)
MCHC RBC AUTO-ENTMCNC: 32.3 G/DL (ref 33.6–35)
MCV RBC AUTO: 88.8 FL (ref 81.4–97.8)
MONOCYTES # BLD AUTO: 0.4 K/UL (ref 0–0.85)
MONOCYTES NFR BLD AUTO: 6.1 % (ref 0–13.4)
NEUTROPHILS # BLD AUTO: 4.03 K/UL (ref 2–7.15)
NEUTROPHILS NFR BLD: 61 % (ref 44–72)
NRBC # BLD AUTO: 0 K/UL
NRBC BLD-RTO: 0 /100 WBC
PLATELET # BLD AUTO: 284 K/UL (ref 164–446)
PMV BLD AUTO: 9.2 FL (ref 9–12.9)
POTASSIUM SERPL-SCNC: 4.7 MMOL/L (ref 3.6–5.5)
PROT SERPL-MCNC: 7.3 G/DL (ref 6–8.2)
RBC # BLD AUTO: 4.36 M/UL (ref 4.2–5.4)
SODIUM SERPL-SCNC: 137 MMOL/L (ref 135–145)
TSH SERPL DL<=0.005 MIU/L-ACNC: 1.51 UIU/ML (ref 0.38–5.33)
WBC # BLD AUTO: 6.6 K/UL (ref 4.8–10.8)

## 2023-03-17 PROCEDURE — 36415 COLL VENOUS BLD VENIPUNCTURE: CPT

## 2023-03-17 PROCEDURE — 85025 COMPLETE CBC W/AUTO DIFF WBC: CPT

## 2023-03-17 PROCEDURE — 84702 CHORIONIC GONADOTROPIN TEST: CPT

## 2023-03-17 PROCEDURE — 80053 COMPREHEN METABOLIC PANEL: CPT

## 2023-03-17 PROCEDURE — 84443 ASSAY THYROID STIM HORMONE: CPT

## 2023-03-20 ENCOUNTER — PRE-ADMISSION TESTING (OUTPATIENT)
Dept: ADMISSIONS | Facility: MEDICAL CENTER | Age: 28
End: 2023-03-20
Attending: OBSTETRICS & GYNECOLOGY
Payer: COMMERCIAL

## 2023-03-23 ENCOUNTER — APPOINTMENT (OUTPATIENT)
Dept: ADMISSIONS | Facility: MEDICAL CENTER | Age: 28
End: 2023-03-23
Attending: OBSTETRICS & GYNECOLOGY
Payer: COMMERCIAL

## 2023-03-23 DIAGNOSIS — Z01.812 PRE-OPERATIVE LABORATORY EXAMINATION: ICD-10-CM

## 2023-03-23 LAB
ABO GROUP BLD: NORMAL
APPEARANCE UR: CLEAR
BILIRUB UR QL STRIP.AUTO: NEGATIVE
BLD GP AB SCN SERPL QL: NORMAL
COLOR UR: YELLOW
GLUCOSE UR STRIP.AUTO-MCNC: NEGATIVE MG/DL
KETONES UR STRIP.AUTO-MCNC: NEGATIVE MG/DL
LEUKOCYTE ESTERASE UR QL STRIP.AUTO: NEGATIVE
MICRO URNS: NORMAL
NITRITE UR QL STRIP.AUTO: NEGATIVE
PH UR STRIP.AUTO: 6.5 [PH] (ref 5–8)
PROT UR QL STRIP: NEGATIVE MG/DL
RBC UR QL AUTO: NEGATIVE
RH BLD: NORMAL
SP GR UR STRIP.AUTO: 1.02
UROBILINOGEN UR STRIP.AUTO-MCNC: 1 MG/DL

## 2023-03-23 PROCEDURE — 86850 RBC ANTIBODY SCREEN: CPT

## 2023-03-23 PROCEDURE — 86900 BLOOD TYPING SEROLOGIC ABO: CPT

## 2023-03-23 PROCEDURE — 81003 URINALYSIS AUTO W/O SCOPE: CPT

## 2023-03-23 PROCEDURE — 86901 BLOOD TYPING SEROLOGIC RH(D): CPT

## 2023-04-11 ENCOUNTER — ANESTHESIA EVENT (OUTPATIENT)
Dept: SURGERY | Facility: MEDICAL CENTER | Age: 28
End: 2023-04-11
Payer: COMMERCIAL

## 2023-04-12 ENCOUNTER — HOSPITAL ENCOUNTER (OUTPATIENT)
Facility: MEDICAL CENTER | Age: 28
End: 2023-04-12
Attending: OBSTETRICS & GYNECOLOGY | Admitting: OBSTETRICS & GYNECOLOGY
Payer: COMMERCIAL

## 2023-04-12 ENCOUNTER — ANESTHESIA (OUTPATIENT)
Dept: SURGERY | Facility: MEDICAL CENTER | Age: 28
End: 2023-04-12
Payer: COMMERCIAL

## 2023-04-12 VITALS
BODY MASS INDEX: 27.62 KG/M2 | RESPIRATION RATE: 14 BRPM | WEIGHT: 155.87 LBS | HEIGHT: 63 IN | SYSTOLIC BLOOD PRESSURE: 115 MMHG | OXYGEN SATURATION: 91 % | HEART RATE: 103 BPM | TEMPERATURE: 97.9 F | DIASTOLIC BLOOD PRESSURE: 64 MMHG

## 2023-04-12 LAB
HCG UR QL: NEGATIVE
PATHOLOGY CONSULT NOTE: NORMAL

## 2023-04-12 PROCEDURE — 700101 HCHG RX REV CODE 250: Performed by: ANESTHESIOLOGY

## 2023-04-12 PROCEDURE — 160025 RECOVERY II MINUTES (STATS): Performed by: OBSTETRICS & GYNECOLOGY

## 2023-04-12 PROCEDURE — 160046 HCHG PACU - 1ST 60 MINS PHASE II: Performed by: OBSTETRICS & GYNECOLOGY

## 2023-04-12 PROCEDURE — 160002 HCHG RECOVERY MINUTES (STAT): Performed by: OBSTETRICS & GYNECOLOGY

## 2023-04-12 PROCEDURE — 160036 HCHG PACU - EA ADDL 30 MINS PHASE I: Performed by: OBSTETRICS & GYNECOLOGY

## 2023-04-12 PROCEDURE — 88307 TISSUE EXAM BY PATHOLOGIST: CPT

## 2023-04-12 PROCEDURE — 700101 HCHG RX REV CODE 250: Performed by: OBSTETRICS & GYNECOLOGY

## 2023-04-12 PROCEDURE — 88305 TISSUE EXAM BY PATHOLOGIST: CPT

## 2023-04-12 PROCEDURE — 160048 HCHG OR STATISTICAL LEVEL 1-5: Performed by: OBSTETRICS & GYNECOLOGY

## 2023-04-12 PROCEDURE — 160047 HCHG PACU  - EA ADDL 30 MINS PHASE II: Performed by: OBSTETRICS & GYNECOLOGY

## 2023-04-12 PROCEDURE — 700111 HCHG RX REV CODE 636 W/ 250 OVERRIDE (IP): Performed by: ANESTHESIOLOGY

## 2023-04-12 PROCEDURE — 160031 HCHG SURGERY MINUTES - 1ST 30 MINS LEVEL 5: Performed by: OBSTETRICS & GYNECOLOGY

## 2023-04-12 PROCEDURE — A9270 NON-COVERED ITEM OR SERVICE: HCPCS | Performed by: ANESTHESIOLOGY

## 2023-04-12 PROCEDURE — 81025 URINE PREGNANCY TEST: CPT

## 2023-04-12 PROCEDURE — 700102 HCHG RX REV CODE 250 W/ 637 OVERRIDE(OP): Performed by: ANESTHESIOLOGY

## 2023-04-12 PROCEDURE — 160042 HCHG SURGERY MINUTES - EA ADDL 1 MIN LEVEL 5: Performed by: OBSTETRICS & GYNECOLOGY

## 2023-04-12 PROCEDURE — 160009 HCHG ANES TIME/MIN: Performed by: OBSTETRICS & GYNECOLOGY

## 2023-04-12 PROCEDURE — 502714 HCHG ROBOTIC SURGERY SERVICES: Performed by: OBSTETRICS & GYNECOLOGY

## 2023-04-12 PROCEDURE — C1713 ANCHOR/SCREW BN/BN,TIS/BN: HCPCS | Performed by: OBSTETRICS & GYNECOLOGY

## 2023-04-12 PROCEDURE — 160035 HCHG PACU - 1ST 60 MINS PHASE I: Performed by: OBSTETRICS & GYNECOLOGY

## 2023-04-12 PROCEDURE — 00840 ANES IPER PX LOWER ABD NOS: CPT | Performed by: ANESTHESIOLOGY

## 2023-04-12 PROCEDURE — 700105 HCHG RX REV CODE 258: Performed by: OBSTETRICS & GYNECOLOGY

## 2023-04-12 RX ORDER — ACETAMINOPHEN 500 MG
1000 TABLET ORAL ONCE
Status: COMPLETED | OUTPATIENT
Start: 2023-04-12 | End: 2023-04-12

## 2023-04-12 RX ORDER — HALOPERIDOL 5 MG/ML
1 INJECTION INTRAMUSCULAR
Status: DISCONTINUED | OUTPATIENT
Start: 2023-04-12 | End: 2023-04-12 | Stop reason: HOSPADM

## 2023-04-12 RX ORDER — LIDOCAINE HYDROCHLORIDE 40 MG/ML
SOLUTION TOPICAL PRN
Status: DISCONTINUED | OUTPATIENT
Start: 2023-04-12 | End: 2023-04-12 | Stop reason: SURG

## 2023-04-12 RX ORDER — OXYCODONE HCL 5 MG/5 ML
5 SOLUTION, ORAL ORAL
Status: COMPLETED | OUTPATIENT
Start: 2023-04-12 | End: 2023-04-12

## 2023-04-12 RX ORDER — SODIUM CHLORIDE, SODIUM LACTATE, POTASSIUM CHLORIDE, CALCIUM CHLORIDE 600; 310; 30; 20 MG/100ML; MG/100ML; MG/100ML; MG/100ML
INJECTION, SOLUTION INTRAVENOUS CONTINUOUS
Status: DISCONTINUED | OUTPATIENT
Start: 2023-04-12 | End: 2023-04-12 | Stop reason: HOSPADM

## 2023-04-12 RX ORDER — BUPIVACAINE HYDROCHLORIDE AND EPINEPHRINE 2.5; 5 MG/ML; UG/ML
INJECTION, SOLUTION EPIDURAL; INFILTRATION; INTRACAUDAL; PERINEURAL
Status: DISCONTINUED | OUTPATIENT
Start: 2023-04-12 | End: 2023-04-12 | Stop reason: HOSPADM

## 2023-04-12 RX ORDER — HYDROMORPHONE HYDROCHLORIDE 1 MG/ML
0.2 INJECTION, SOLUTION INTRAMUSCULAR; INTRAVENOUS; SUBCUTANEOUS
Status: DISCONTINUED | OUTPATIENT
Start: 2023-04-12 | End: 2023-04-12 | Stop reason: HOSPADM

## 2023-04-12 RX ORDER — OXYCODONE HCL 10 MG/1
10 TABLET, FILM COATED, EXTENDED RELEASE ORAL ONCE
Status: COMPLETED | OUTPATIENT
Start: 2023-04-12 | End: 2023-04-12

## 2023-04-12 RX ORDER — HYDROMORPHONE HYDROCHLORIDE 1 MG/ML
0.4 INJECTION, SOLUTION INTRAMUSCULAR; INTRAVENOUS; SUBCUTANEOUS
Status: DISCONTINUED | OUTPATIENT
Start: 2023-04-12 | End: 2023-04-12 | Stop reason: HOSPADM

## 2023-04-12 RX ORDER — ALBUTEROL SULFATE 2.5 MG/3ML
2.5 SOLUTION RESPIRATORY (INHALATION)
Status: DISCONTINUED | OUTPATIENT
Start: 2023-04-12 | End: 2023-04-12 | Stop reason: HOSPADM

## 2023-04-12 RX ORDER — ONDANSETRON 2 MG/ML
4 INJECTION INTRAMUSCULAR; INTRAVENOUS
Status: COMPLETED | OUTPATIENT
Start: 2023-04-12 | End: 2023-04-12

## 2023-04-12 RX ORDER — OXYCODONE HCL 10 MG/1
TABLET, FILM COATED, EXTENDED RELEASE ORAL
Status: DISCONTINUED
Start: 2023-04-12 | End: 2023-04-12 | Stop reason: HOSPADM

## 2023-04-12 RX ORDER — OXYCODONE HCL 5 MG/5 ML
10 SOLUTION, ORAL ORAL
Status: COMPLETED | OUTPATIENT
Start: 2023-04-12 | End: 2023-04-12

## 2023-04-12 RX ORDER — DEXAMETHASONE SODIUM PHOSPHATE 4 MG/ML
INJECTION, SOLUTION INTRA-ARTICULAR; INTRALESIONAL; INTRAMUSCULAR; INTRAVENOUS; SOFT TISSUE PRN
Status: DISCONTINUED | OUTPATIENT
Start: 2023-04-12 | End: 2023-04-12 | Stop reason: SURG

## 2023-04-12 RX ORDER — CELECOXIB 200 MG/1
200 CAPSULE ORAL ONCE
Status: COMPLETED | OUTPATIENT
Start: 2023-04-12 | End: 2023-04-12

## 2023-04-12 RX ORDER — HYDROMORPHONE HYDROCHLORIDE 1 MG/ML
0.1 INJECTION, SOLUTION INTRAMUSCULAR; INTRAVENOUS; SUBCUTANEOUS
Status: DISCONTINUED | OUTPATIENT
Start: 2023-04-12 | End: 2023-04-12 | Stop reason: HOSPADM

## 2023-04-12 RX ORDER — MEPERIDINE HYDROCHLORIDE 25 MG/ML
6.25 INJECTION INTRAMUSCULAR; INTRAVENOUS; SUBCUTANEOUS
Status: DISCONTINUED | OUTPATIENT
Start: 2023-04-12 | End: 2023-04-12 | Stop reason: HOSPADM

## 2023-04-12 RX ORDER — SCOLOPAMINE TRANSDERMAL SYSTEM 1 MG/1
1 PATCH, EXTENDED RELEASE TRANSDERMAL
Status: DISCONTINUED | OUTPATIENT
Start: 2023-04-12 | End: 2023-04-12 | Stop reason: HOSPADM

## 2023-04-12 RX ORDER — ACETAMINOPHEN 500 MG
TABLET ORAL
Status: DISCONTINUED
Start: 2023-04-12 | End: 2023-04-12 | Stop reason: HOSPADM

## 2023-04-12 RX ORDER — LIDOCAINE HYDROCHLORIDE 20 MG/ML
INJECTION, SOLUTION EPIDURAL; INFILTRATION; INTRACAUDAL; PERINEURAL PRN
Status: DISCONTINUED | OUTPATIENT
Start: 2023-04-12 | End: 2023-04-12 | Stop reason: SURG

## 2023-04-12 RX ORDER — CELECOXIB 200 MG/1
CAPSULE ORAL
Status: DISCONTINUED
Start: 2023-04-12 | End: 2023-04-12 | Stop reason: HOSPADM

## 2023-04-12 RX ORDER — DIPHENHYDRAMINE HYDROCHLORIDE 50 MG/ML
12.5 INJECTION INTRAMUSCULAR; INTRAVENOUS
Status: DISCONTINUED | OUTPATIENT
Start: 2023-04-12 | End: 2023-04-12 | Stop reason: HOSPADM

## 2023-04-12 RX ORDER — SODIUM CHLORIDE, SODIUM LACTATE, POTASSIUM CHLORIDE, CALCIUM CHLORIDE 600; 310; 30; 20 MG/100ML; MG/100ML; MG/100ML; MG/100ML
INJECTION, SOLUTION INTRAVENOUS CONTINUOUS
Status: ACTIVE | OUTPATIENT
Start: 2023-04-12 | End: 2023-04-12

## 2023-04-12 RX ORDER — MAGNESIUM SULFATE HEPTAHYDRATE 40 MG/ML
INJECTION, SOLUTION INTRAVENOUS PRN
Status: DISCONTINUED | OUTPATIENT
Start: 2023-04-12 | End: 2023-04-12 | Stop reason: SURG

## 2023-04-12 RX ORDER — CEFAZOLIN SODIUM 1 G/3ML
INJECTION, POWDER, FOR SOLUTION INTRAMUSCULAR; INTRAVENOUS PRN
Status: DISCONTINUED | OUTPATIENT
Start: 2023-04-12 | End: 2023-04-12 | Stop reason: SURG

## 2023-04-12 RX ORDER — ONDANSETRON 2 MG/ML
INJECTION INTRAMUSCULAR; INTRAVENOUS PRN
Status: DISCONTINUED | OUTPATIENT
Start: 2023-04-12 | End: 2023-04-12 | Stop reason: SURG

## 2023-04-12 RX ORDER — SCOLOPAMINE TRANSDERMAL SYSTEM 1 MG/1
PATCH, EXTENDED RELEASE TRANSDERMAL
Status: DISCONTINUED
Start: 2023-04-12 | End: 2023-04-12 | Stop reason: HOSPADM

## 2023-04-12 RX ADMIN — PROPOFOL 180 MG: 10 INJECTION, EMULSION INTRAVENOUS at 07:30

## 2023-04-12 RX ADMIN — HYDROMORPHONE HYDROCHLORIDE 0.2 MG: 1 INJECTION, SOLUTION INTRAMUSCULAR; INTRAVENOUS; SUBCUTANEOUS at 10:38

## 2023-04-12 RX ADMIN — OXYCODONE HYDROCHLORIDE 10 MG: 5 SOLUTION ORAL at 09:48

## 2023-04-12 RX ADMIN — FENTANYL CITRATE 50 MCG: 50 INJECTION, SOLUTION INTRAMUSCULAR; INTRAVENOUS at 07:40

## 2023-04-12 RX ADMIN — ROCURONIUM BROMIDE 50 MG: 10 INJECTION, SOLUTION INTRAVENOUS at 07:30

## 2023-04-12 RX ADMIN — FENTANYL CITRATE 25 MCG: 50 INJECTION, SOLUTION INTRAMUSCULAR; INTRAVENOUS at 10:55

## 2023-04-12 RX ADMIN — DEXAMETHASONE SODIUM PHOSPHATE 8 MG: 4 INJECTION, SOLUTION INTRA-ARTICULAR; INTRALESIONAL; INTRAMUSCULAR; INTRAVENOUS; SOFT TISSUE at 07:34

## 2023-04-12 RX ADMIN — FENTANYL CITRATE 25 MCG: 50 INJECTION, SOLUTION INTRAMUSCULAR; INTRAVENOUS at 10:51

## 2023-04-12 RX ADMIN — CELECOXIB 200 MG: 200 CAPSULE ORAL at 07:01

## 2023-04-12 RX ADMIN — PROPOFOL 20 MG: 10 INJECTION, EMULSION INTRAVENOUS at 09:25

## 2023-04-12 RX ADMIN — LIDOCAINE HYDROCHLORIDE 4 ML: 40 SOLUTION TOPICAL at 07:32

## 2023-04-12 RX ADMIN — MAGNESIUM SULFATE HEPTAHYDRATE 2 G: 40 INJECTION, SOLUTION INTRAVENOUS at 08:39

## 2023-04-12 RX ADMIN — LIDOCAINE HYDROCHLORIDE 40 MG: 20 INJECTION, SOLUTION EPIDURAL; INFILTRATION; INTRACAUDAL at 07:30

## 2023-04-12 RX ADMIN — ONDANSETRON 4 MG: 2 INJECTION INTRAMUSCULAR; INTRAVENOUS at 09:49

## 2023-04-12 RX ADMIN — HYDROMORPHONE HYDROCHLORIDE 0.2 MG: 1 INJECTION, SOLUTION INTRAMUSCULAR; INTRAVENOUS; SUBCUTANEOUS at 10:17

## 2023-04-12 RX ADMIN — MIDAZOLAM 2 MG: 1 INJECTION INTRAMUSCULAR; INTRAVENOUS at 07:26

## 2023-04-12 RX ADMIN — SUGAMMADEX 200 MG: 100 INJECTION, SOLUTION INTRAVENOUS at 09:24

## 2023-04-12 RX ADMIN — FENTANYL CITRATE 25 MCG: 50 INJECTION, SOLUTION INTRAMUSCULAR; INTRAVENOUS at 11:00

## 2023-04-12 RX ADMIN — FENTANYL CITRATE 50 MCG: 50 INJECTION, SOLUTION INTRAMUSCULAR; INTRAVENOUS at 07:28

## 2023-04-12 RX ADMIN — HYDROMORPHONE HYDROCHLORIDE 0.2 MG: 1 INJECTION, SOLUTION INTRAMUSCULAR; INTRAVENOUS; SUBCUTANEOUS at 10:23

## 2023-04-12 RX ADMIN — FENTANYL CITRATE 50 MCG: 50 INJECTION, SOLUTION INTRAMUSCULAR; INTRAVENOUS at 08:38

## 2023-04-12 RX ADMIN — HYDROMORPHONE HYDROCHLORIDE 0.2 MG: 1 INJECTION, SOLUTION INTRAMUSCULAR; INTRAVENOUS; SUBCUTANEOUS at 10:28

## 2023-04-12 RX ADMIN — SODIUM CHLORIDE, POTASSIUM CHLORIDE, SODIUM LACTATE AND CALCIUM CHLORIDE: 600; 310; 30; 20 INJECTION, SOLUTION INTRAVENOUS at 07:26

## 2023-04-12 RX ADMIN — ACETAMINOPHEN 1000 MG: 500 TABLET, FILM COATED ORAL at 07:01

## 2023-04-12 RX ADMIN — SCOPALAMINE 1 PATCH: 1 PATCH, EXTENDED RELEASE TRANSDERMAL at 07:01

## 2023-04-12 RX ADMIN — ROCURONIUM BROMIDE 20 MG: 10 INJECTION, SOLUTION INTRAVENOUS at 08:34

## 2023-04-12 RX ADMIN — ONDANSETRON 4 MG: 2 INJECTION INTRAMUSCULAR; INTRAVENOUS at 09:17

## 2023-04-12 RX ADMIN — CEFAZOLIN 2 G: 330 INJECTION, POWDER, FOR SOLUTION INTRAMUSCULAR; INTRAVENOUS at 07:33

## 2023-04-12 RX ADMIN — FENTANYL CITRATE 50 MCG: 50 INJECTION, SOLUTION INTRAMUSCULAR; INTRAVENOUS at 08:11

## 2023-04-12 RX ADMIN — HYDROMORPHONE HYDROCHLORIDE 0.2 MG: 1 INJECTION, SOLUTION INTRAMUSCULAR; INTRAVENOUS; SUBCUTANEOUS at 10:33

## 2023-04-12 RX ADMIN — OXYCODONE HYDROCHLORIDE 10 MG: 10 TABLET, FILM COATED, EXTENDED RELEASE ORAL at 07:01

## 2023-04-12 RX ADMIN — SODIUM CHLORIDE, POTASSIUM CHLORIDE, SODIUM LACTATE AND CALCIUM CHLORIDE: 600; 310; 30; 20 INJECTION, SOLUTION INTRAVENOUS at 09:04

## 2023-04-12 ASSESSMENT — PAIN DESCRIPTION - PAIN TYPE
TYPE: ACUTE PAIN;SURGICAL PAIN
TYPE: ACUTE PAIN

## 2023-04-12 ASSESSMENT — FIBROSIS 4 INDEX: FIB4 SCORE: 0.27

## 2023-04-12 NOTE — OP REPORT
PreOp Diagnosis: Endometriosis, Chronic pelvic pain, dysmenorrhea, Dyspareunia, Left ovarian cyst        PostOp Diagnosis: Same        Procedure(s):  ROBOTICALLY ASSISTED TOTAL LAPAROSCOPIC HYSTERECTOMY - Wound Class: Clean Contaminated  CYSTOSCOPY - Wound Class: Clean Contaminated  OOPHOROPEXY - Wound Class: Clean Contaminated  LEFT OVARIAN CYSTECTOMY - Wound Class: Clean Contaminated  FULGURATION, ENDOMETRIOSIS, LAPAROSCOPIC - Wound Class: Clean Contaminated     Surgeon(s):  Everardo Lara M.D.     Assistant: Alek Colorado     Anesthesiologist/Type of Anesthesia:  Anesthesiologist: Camila Myers M.D./General     Surgical Staff:  Circulator: Mandy Hurst R.N.  Relief Circulator: Aurora Dimas R.N.  Scrub Person: Yasemin Resendiz Assist: Alek Colorado     Specimens removed if any:  ID Type Source Tests Collected by Time Destination   A : Left ovarian cyst Other Other PATHOLOGY SPECIMEN Everardo Lara M.D. 4/12/2023 0814     B : uterus and cervix Tissue Uterus PATHOLOGY SPECIMEN Everardo Lara M.D. 4/12/2023 0846           Estimated Blood Loss: 40cc     Findings: Bladder scarred anterior uterus.  Endometriosis of the posterior cul-de-sac.  Abnormal appearing left ovarian cyst about 2 cm.  Simple appearing left ovarian cyst.  Retroverted uterus that sounded to 9 cm, otherwise normal-appearing pelvic anatomy.  Normal bladder cystoscopy at end of case.     Complications: None    Description of procedure:  Patient was taken to the operating room where general anesthesia was found to be adequate.  She was then prepared and draped in normal sterile fashion in the dorsal lithotomy position with legs in Jj stirrups.  Alexander was placed.    The assistant tried to place the uterine manipulator but there was a small perforation to the anterior cervix into the peritoneum.  It was between the level of the bladder and the uterus.  Therefore quite a bit of time was spent trying to get into this difficult retroverted  uterus.  The cervix had some scarring in the canal as well.  But eventually we are able to place and a 8 sized tip Rosa 2 uterine manipulator with a medium colpotomy ring.      Attention was turned to the abdominal portion of the case.  Please note prior to any abdominal incision about 3 cc of 0.25% Marcaine was injected subcuticularly.  An umbilical incision was made about 8 mm in nature.  A varies needle was then placed into the peritoneal cavity while tenting the abdominal wall anteriorly and placing the needle in the midline, at a 45 degree angle, pointed towards the patient's feet.  This was done until there were 2 gentle pops.  Saline then easily infused the needle and upon return there was no blood, bowel, or saline product.  CO2 gas was attached to the needle and there is initial high flow and low pressure and the abdomen was insufflated to a level of 15 mmHg pressure with about 4 L CO2 gas.  The varies needle was removed.  An 8 mm trocar was then placed in a similar manner to the varies needle.  Camera was placed into the abdomen.  There was no evidence of any intra trauma to the abdomen at this time including vessel nor bowel.    3 more ports were placed under direct visualization.  The first 1 about 8 cm to the right of the umbilicus.  The second 1 about 8 cm to left the umbilicus.  The last about 16 cm to the left of the umbilicus.  There placed under direct visualization and there was no contact with abdominal contents.    Patient was placed in Trendelenburg and the da Ruby robot was docked and targeted according to protocol.    A survey of the pelvis at this time found the findings dictated above.  We backfilled the bladder and found that the perforation from the initial placement of the uterine manipulator was indeed outside of the bladder.  Patient had evidence of previous bilateral salpingectomy.  The uterosacral ligaments were marked.  Patient had endometriosis of the posterior cul-de-sac that was  quite large.  This was destroyed with cautery.    Her ovaries were reviewed and patient had a dark abnormal appearing left ovary.  Cautery was used to make an incision over the ovary.  And the pieces of the ovarian cyst were removed and sent to pathology.  Hemostasis was made.    We then move forward with a hysterectomy.  The left uterine ovarian ligament was then sealed and cut.  It should be noted that both ureters could be seen transperitoneally and were felt to be well away from the operative areas.  We continued with the vessel sealer staying close to the uterus and continued to seal and cut through the ligaments until we were just past the round ligament on the left side.  We then did the same procedure on the patient's right side.    We then entered and anteriorly.  We created a bladder flap.  The bladder was backfilled to demarcate its margin prior to entrance into the peritoneum.  Peritoneum was grasped cephalad to the bladder and monopolar cautery was used to create a bladder flap.  We then entered and anteriorly over the colpotomy ring.  Please note when entering over the tissue we would first use coag cautery and then followed by cut cautery when entering the vaginal epithelium.    We then isolated the uterine vessels on the left side.  These were then sealed and cut.  We did the same thing on the patient's right side.  We then went posteriorly and entered over the colpotomy ring.  We continued around the colpotomy ring until the uterus and cervix were freed.  These were delivered through the vagina.    The vaginal cuff was then closed with 0 Vicryl in 2 layers.  2 anchoring stitches were made at the corners of the vaginal cuff.  The first layer was then taking care to incorporate the uterosacral ligaments.  The second layer was an imbricating layer incorporating the peritoneum as well.    We then performed ovarian suspension.  0 chromic was used to start a suture the right round ligament and we ran the  suture through the broad ligament then up to the ovary and this pulled the ovary up to the top of the round ligament where we sutured it in place.  By running the suture it closed any potential space between the ovary and the sidewall.  The same procedure was also performed on the left.    The pelvis was cleared of any clot and debris.  Some Ashok was placed along the suture line.    Abdomen was deflated and the ports removed.  Skin was closed with subcuticular stay stitches and glue.    A bladder cystoscopy was then performed.  The bladder was instilled with 350 cc of 20% mannitol solution.  Both ureters could be seen to be freely flowing urine.  There was no evidence of any trauma to the bladder nor the urethra.  It was a normal bladder cystoscopy.    Patient tolerated the procedure well.  Circulating staff notes the counts were correct.  Patient was taken recovery room in stable condition.

## 2023-04-12 NOTE — OR SURGEON
Immediate Post OP Note    PreOp Diagnosis: Endometriosis, Chronic pelvic pain, dysmenorrhea, Dyspareunia, Left ovarian cyst      PostOp Diagnosis: Same      Procedure(s):  ROBOTICALLY ASSISTED TOTAL LAPAROSCOPIC HYSTERECTOMY - Wound Class: Clean Contaminated  CYSTOSCOPY - Wound Class: Clean Contaminated  OOPHOROPEXY - Wound Class: Clean Contaminated  LEFT OVARIAN CYSTECTOMY - Wound Class: Clean Contaminated  FULGURATION, ENDOMETRIOSIS, LAPAROSCOPIC - Wound Class: Clean Contaminated    Surgeon(s):  Everardo Lara M.D.    Assistant: Alek Colorado    Anesthesiologist/Type of Anesthesia:  Anesthesiologist: Camila Myers M.D./General    Surgical Staff:  Circulator: Mandy Hurst R.N.  Relief Circulator: Aurora Dimas R.N.  Scrub Person: Yasemin Resendiz Assist: Alek Colorado    Specimens removed if any:  ID Type Source Tests Collected by Time Destination   A : Left ovarian cyst Other Other PATHOLOGY SPECIMEN Everardo Lara M.D. 4/12/2023 0814    B : uterus and cervix Tissue Uterus PATHOLOGY SPECIMEN Everardo Lara M.D. 4/12/2023 0846        Estimated Blood Loss: 40cc    Findings: Bladder scarred anterior uterus.  Endometriosis of the posterior cul-de-sac.  Abnormal appearing left ovarian cyst about 2 cm.  Simple appearing left ovarian cyst.  Retroverted uterus that sounded to 9 cm, otherwise normal-appearing pelvic anatomy.  Normal bladder cystoscopy at end of case.    Complications: None        4/12/2023 9:35 AM Everardo Lara M.D.

## 2023-04-12 NOTE — DISCHARGE INSTRUCTIONS
HOME CARE INSTRUCTIONS    ACTIVITY: Rest and take it easy for the first 24 hours.  A responsible adult is recommended to remain with you during that time.  It is normal to feel sleepy.  We encourage you to not do anything that requires balance, judgment or coordination.    FOR 24 HOURS DO NOT:  Drive, operate machinery or run household appliances.  Drink beer or alcoholic beverages.  Make important decisions or sign legal documents.    SPECIAL INSTRUCTIONS:   Follow up immediately in the office during business hours or the emergency room after hours with fever >100 degrees, severe pain, intractable nausea or vomiting, syncope or dizziness, vaginal bleeding greater than spotting, problem with wound, any concerns.   Pelvic rest for 6 weeks (nothing in the vagina; tampons, sex, etc)  Do not drive while taking narcotic pain medication  Do not lift greater than 10 pounds.    Follow up appointment 1-2 weeks, call the office to schedule.    DIET: To avoid nausea, slowly advance diet as tolerated, avoiding spicy or greasy foods for the first day.  Add more substantial food to your diet according to your physician's instructions.  INCREASE FLUIDS AND FIBER TO AVOID CONSTIPATION.    MEDICATIONS: Resume taking daily medication.  Take prescribed pain medication with food.  If no medication is prescribed, you may take non-aspirin pain medication if needed.  PAIN MEDICATION CAN BE VERY CONSTIPATING.  Take a stool softener or laxative such as senokot, pericolace, or milk of magnesia if needed.    Prescription given for Percocet.  Last pain medication given at Oxycodone at 09:48.    A follow-up appointment should be arranged with your doctor in 1-2 weeks; call to schedule.    You should CALL YOUR PHYSICIAN if you develop:  Fever greater than 101 degrees F.  Pain not relieved by medication, or persistent nausea or vomiting.  Excessive bleeding (blood soaking through dressing) or unexpected drainage from the wound.  Extreme redness  or swelling around the incision site, drainage of pus or foul smelling drainage.  Inability to urinate or empty your bladder within 8 hours.  Problems with breathing or chest pain.    You should call 911 if you develop problems with breathing or chest pain.  If you are unable to contact your doctor or surgical center, you should go to the nearest emergency room or urgent care center.    Physician's telephone #: [phone] Dr Lara    MILD FLU-LIKE SYMPTOMS ARE NORMAL.  YOU MAY EXPERIENCE GENERALIZED MUSCLE ACHES, THROAT IRRITATION, HEADACHE AND/OR SOME NAUSEA.    If any questions arise, call your doctor.  If your doctor is not available, please feel free to call the Surgical Center at [phone].  The Center is open Monday through Friday from 7AM to 7PM.      A registered nurse may call you a few days after your surgery to see how you are doing after your procedure.    You may also receive a survey in the mail within the next two weeks and we ask that you take a few moments to complete the survey and return it to us.  Our goal is to provide you with very good care and we value your comments.     Depression / Suicide Risk    As you are discharged from this [facility] facility, it is important to learn how to keep safe from harming yourself.    Recognize the warning signs:  Abrupt changes in personality, positive or negative- including increase in energy   Giving away possessions  Change in eating patterns- significant weight changes-  positive or negative  Change in sleeping patterns- unable to sleep or sleeping all the time   Unwillingness or inability to communicate  Depression  Unusual sadness, discouragement and loneliness  Talk of wanting to die  Neglect of personal appearance   Rebelliousness- reckless behavior  Withdrawal from people/activities they love  Confusion- inability to concentrate     If you or a loved one observes any of these behaviors or has concerns about self-harm, here's what you can  do:  Talk about it- your feelings and reasons for harming yourself  Remove any means that you might use to hurt yourself (examples: pills, rope, extension cords, firearm)  Get professional help from the community (Mental Health, Substance Abuse, psychological counseling)  Do not be alone:Call your Safe Contact- someone whom you trust who will be there for you.  Call your local CRISIS HOTLINE 112-8617 or 149-334-6915  Call your local Children's Mobile Crisis Response Team [place] (840) 580-6898 or www.[employer]  Call the toll free National Suicide Prevention Hotlines   National Suicide Prevention Lifeline 771-396-DZYF (0041)  National Hope Line Network 800-SUICIDE (751-0105)    I acknowledge receipt and understanding of these Home Care instructions.

## 2023-04-12 NOTE — OR NURSING
Pt weaned to RA, VSS, pain tolerable. Pt ambulated to BR in PACU with stand-by assistance. Pt unable to void.   Report called to phase II and pt transported via gurney.

## 2023-04-12 NOTE — ANESTHESIA PROCEDURE NOTES
Airway    Date/Time: 4/12/2023 7:32 AM  Performed by: Camila Myers M.D.  Authorized by: Camila Myers M.D.     Location:  OR  Urgency:  Elective  Difficult Airway: No    Indications for Airway Management:  Anesthesia      Spontaneous Ventilation: absent    Sedation Level:  Deep  Preoxygenated: Yes    Patient Position:  Sniffing  Mask Difficulty Assessment:  1 - vent by mask  Final Airway Type:  Endotracheal airway  Final Endotracheal Airway:  ETT  Cuffed: Yes    Technique Used for Successful ETT Placement:  Direct laryngoscopy  Devices/Methods Used in Placement:  Anterior pressure/BURP and intubating stylet    Insertion Site:  Oral  Blade Type:  Lyle  Laryngoscope Blade/Videolaryngoscope Blade Size:  3  ETT Size (mm):  6.5  Measured from:  Teeth  ETT to Teeth (cm):  21  Placement Verified by: auscultation and capnometry    Cormack-Lehane Classification:  Grade I - full view of glottis  Number of Attempts at Approach:  1

## 2023-04-12 NOTE — OR NURSING
Pt reports pain tolerable and denies nausea. Pt continues to require 1 L O2 via NC. Pt sitting up in gurney, popsicle and water given. Pt using IS.    called and updated.    Complex Repair And Single Advancement Flap Text: The defect edges were debeveled with a #15 scalpel blade.  The primary defect was closed partially with a complex linear closure.  Given the location of the remaining defect, shape of the defect and the proximity to free margins a single advancement flap was deemed most appropriate for complete closure of the defect.  Using a sterile surgical marker, an appropriate advancement flap was drawn incorporating the defect and placing the expected incisions within the relaxed skin tension lines where possible.    The area thus outlined was incised deep to adipose tissue with a #15 scalpel blade.  The skin margins were undermined to an appropriate distance in all directions utilizing iris scissors.

## 2023-04-12 NOTE — PROGRESS NOTES
Patient to stage two from PACU. Patient up sitting in recliner chair. VSS. Denies pain or nausea at this time. Patient denies need to urinate, understands she must do so before discharge.       D/C instructions given and patient educated on instructions, all questions addressed.     1300 patient urinated. Dressed and ready to go

## 2023-04-12 NOTE — ANESTHESIA POSTPROCEDURE EVALUATION
Patient: Brenda Westbrook    Procedure Summary     Date: 04/12/23 Room / Location: [place] [address] / SURGERY [facility]    Anesthesia Start: 0726 Anesthesia Stop:     Procedures:       ROBOTICALLY ASSISTED TOTAL LAPAROSCOPIC HYSTERECTOMY, BLADDER CYSTOSCOPY, POSSIBLE OVARIAN SUSPENSION, POSSIBLE OOPHORECTOMY, POSSIBLE LEFT OVARIAN CYSTECTOMY, POSSIBLE LEFT SALPINGO OOPHORECTOMY      CYSTOSCOPY Diagnosis: (ENDOMETRIOSIS PELVIC PAIN, DYSMENORRHEA)    Surgeons: Everardo Lara M.D. Responsible Provider: Camila Myers M.D.    Anesthesia Type: general ASA Status: 2          Final Anesthesia Type: general  Last vitals  BP   Blood Pressure: 136/76    Temp   36.1 °C (97 °F)    Pulse   (!) 108   Resp   16    SpO2   98 %      Anesthesia Post Evaluation    Patient location during evaluation: PACU  Patient participation: complete - patient participated  Level of consciousness: awake and alert    Airway patency: patent  Anesthetic complications: no  Cardiovascular status: hemodynamically stable  Respiratory status: acceptable  Hydration status: euvolemic    PONV: none          No notable events documented.     Nurse Pain Score: 5 (NPRS)

## 2023-04-12 NOTE — ANESTHESIA TIME REPORT
Anesthesia Start and Stop Event Times     Date Time Event    4/12/2023 0711 Ready for Procedure     0726 Anesthesia Start     0933 Anesthesia Stop        Responsible Staff  04/12/23    Name Role Begin End    Camila Myers M.D. Anesth 0726 0912        Overtime Reason:  no overtime (within assigned shift)    Comments:

## 2023-04-12 NOTE — ANESTHESIA PREPROCEDURE EVALUATION
Case: [id number] Date/Time: 23 0715    Procedures:       ROBOTICALLY ASSISTED TOTAL LAPAROSCOPIC HYSTERECTOMY, BLADDER CYSTOSCOPY, POSSIBLE OVARIAN SUSPENSION, POSSIBLE OOPHORECTOMY, POSSIBLE LEFT OVARIAN CYSTECTOMY, POSSIBLE LEFT SALPINGO OOPHORECTOMY      CYSTOSCOPY    Pre-op diagnosis: ENDOMETRIOSIS PELVIC PAIN, DYSMENORRHEA    Location: [place] OR 17 / SURGERY [facility]    Surgeons: Everardo Lara M.D.        27 yo F with asthma and migraines.  Denies problems with anesthesia in the past.  No current CP/SOB/N/V symptoms.    NPO  B-HCG neg  Relevant Problems   PULMONARY   (positive) Asthma, exercise induced      NEURO   (positive) Intractable migraine without aura and without status migrainosus   (positive) Migraine without aura and without status migrainosus, not intractable      CARDIAC   (positive) Intractable migraine without aura and without status migrainosus   (positive) Migraine without aura and without status migrainosus, not intractable      OB   (positive) S/P  section       Physical Exam    Airway   Mallampati: II  TM distance: >3 FB  Neck ROM: full       Cardiovascular - normal exam  Rhythm: regular  Rate: normal  (-) murmur     Dental - normal exam           Pulmonary - normal exam  Breath sounds clear to auscultation     Abdominal    Neurological - normal exam                 Anesthesia Plan    ASA 2       Plan - general       Airway plan will be ETT          Induction: intravenous    Postoperative Plan: Postoperative administration of opioids is intended.    Pertinent diagnostic labs and testing reviewed    Informed Consent:    Anesthetic plan and risks discussed with patient.    Use of blood products discussed with: patient whom consented to blood products.

## 2023-04-12 NOTE — OR NURSING
Pt arouses easily to voice and is oriented x4. VSS w/ supplemental O2. O2 titrated as tolerated.   Surgical incisions CDI. Ice pack in place.   Pt reports lower abdominal pain, as well as, pain and irritation from her Alexander catheter.   Catheter removed in PACU per Dr. Schneider order.   Pain medication administered per orders.   Pt's  called and updated.

## 2023-04-19 ENCOUNTER — TELEPHONE (OUTPATIENT)
Dept: BEHAVIORAL HEALTH | Facility: CLINIC | Age: 28
End: 2023-04-19
Payer: COMMERCIAL

## 2023-04-25 ENCOUNTER — APPOINTMENT (OUTPATIENT)
Dept: BEHAVIORAL HEALTH | Facility: CLINIC | Age: 28
End: 2023-04-25
Payer: COMMERCIAL

## 2023-05-09 ENCOUNTER — OFFICE VISIT (OUTPATIENT)
Dept: BEHAVIORAL HEALTH | Facility: CLINIC | Age: 28
End: 2023-05-09
Payer: COMMERCIAL

## 2023-05-09 DIAGNOSIS — F31.81 BIPOLAR 2 DISORDER (HCC): ICD-10-CM

## 2023-05-09 PROCEDURE — 99214 OFFICE O/P EST MOD 30 MIN: CPT | Performed by: PSYCHIATRY & NEUROLOGY

## 2023-05-09 RX ORDER — DULOXETIN HYDROCHLORIDE 30 MG/1
30 CAPSULE, DELAYED RELEASE ORAL DAILY
Qty: 42 CAPSULE | Refills: 0 | Status: SHIPPED | OUTPATIENT
Start: 2023-05-09 | End: 2023-05-16 | Stop reason: SDUPTHER

## 2023-05-09 RX ORDER — BUPROPION HYDROCHLORIDE 150 MG/1
150 TABLET ORAL EVERY MORNING
Qty: 14 TABLET | Refills: 0 | Status: SHIPPED | OUTPATIENT
Start: 2023-05-09 | End: 2023-05-23
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
## 2023-05-11 ENCOUNTER — TELEPHONE (OUTPATIENT)
Dept: NEUROLOGY | Facility: MEDICAL CENTER | Age: 28
End: 2023-05-11
Payer: COMMERCIAL

## 2023-05-11 NOTE — TELEPHONE ENCOUNTER
NEUROLOGY PATIENT PRE-VISIT PLANNING     Patient was NOT contacted to complete PVP.    Patient Appointment is scheduled as: Established Patient     Is visit type and length scheduled correctly? Yes    [employer] Patient is checked in Patient Demographics? Yes    3.   Is referral attached to visit? Yes    4. Were records received from referring provider? Yes    4. Patient was NOT contacted to have someone accompany them to visit.     5. Is this appointment scheduled as a Hospital Follow-Up?  No    6. Does the patient require any pre procedure or post procedure follow up? No    7. If any orders were placed at last visit or intended to be done for this visit do we have Results/Consult Notes? Yes  Labs -  Recent labs are in [facility].   Imaging - Imaging was not ordered at last office visit.  Referrals - No referrals were ordered at last office visit.    8. If patient appointment is for Botox - is order pended for provider? N/A

## 2023-05-12 ENCOUNTER — OFFICE VISIT (OUTPATIENT)
Dept: NEUROLOGY | Facility: MEDICAL CENTER | Age: 28
End: 2023-05-12
Attending: PSYCHIATRY & NEUROLOGY
Payer: COMMERCIAL

## 2023-05-12 VITALS
WEIGHT: 150.79 LBS | SYSTOLIC BLOOD PRESSURE: 114 MMHG | OXYGEN SATURATION: 99 % | HEART RATE: 107 BPM | HEIGHT: 63 IN | DIASTOLIC BLOOD PRESSURE: 78 MMHG | TEMPERATURE: 97.5 F | BODY MASS INDEX: 26.72 KG/M2

## 2023-05-12 DIAGNOSIS — G43.009 MIGRAINE WITHOUT AURA AND WITHOUT STATUS MIGRAINOSUS, NOT INTRACTABLE: ICD-10-CM

## 2023-05-12 PROCEDURE — 3078F DIAST BP <80 MM HG: CPT | Performed by: PSYCHIATRY & NEUROLOGY

## 2023-05-12 PROCEDURE — 1125F AMNT PAIN NOTED PAIN PRSNT: CPT | Performed by: PSYCHIATRY & NEUROLOGY

## 2023-05-12 PROCEDURE — 3074F SYST BP LT 130 MM HG: CPT | Performed by: PSYCHIATRY & NEUROLOGY

## 2023-05-12 PROCEDURE — 99213 OFFICE O/P EST LOW 20 MIN: CPT | Performed by: PSYCHIATRY & NEUROLOGY

## 2023-05-12 PROCEDURE — 99212 OFFICE O/P EST SF 10 MIN: CPT | Performed by: PSYCHIATRY & NEUROLOGY

## 2023-05-12 RX ORDER — BUTALBITAL, ACETAMINOPHEN AND CAFFEINE 50; 325; 40 MG/1; MG/1; MG/1
1 TABLET ORAL EVERY 4 HOURS PRN
Qty: 30 TABLET | Refills: 1 | Status: SHIPPED | OUTPATIENT
Start: 2023-05-12 | End: 2023-07-13

## 2023-05-12 RX ORDER — IMIPRAMINE PAMOATE 75 MG
75 CAPSULE ORAL NIGHTLY
Qty: 90 CAPSULE | Refills: 3 | Status: SHIPPED | OUTPATIENT
Start: 2023-05-12 | End: 2024-01-04 | Stop reason: SDUPTHER

## 2023-05-12 ASSESSMENT — ENCOUNTER SYMPTOMS
HEADACHES: 1
MEMORY LOSS: 0
CONSTIPATION: 0

## 2023-05-12 ASSESSMENT — FIBROSIS 4 INDEX: FIB4 SCORE: 0.27

## 2023-05-12 NOTE — PROGRESS NOTES
Subjective     Brenda Westbrook is a 28 y.o. female who presents for follow-up, with a history of migraine without aura, and who has maintained control with imipramine.     HPI    Since last seen 6 months ago, she states the headaches have remained well controlled.  Unfortunately, a 3-week interval past when her pharmacy did not refill the medication, she went into withdrawal, needless to say everything went south including the headaches.  Back on drug, the headaches are minimal.  She estimates since January of this year she might of had 3 headaches on other occasions for which rescue with butalbital was required.  She will use over-the-counter medication initially, if she catches it early enough, the headache lingers throughout the rest of the day but typically resolves by day 2.  If not, using butalbital is enough, there have been only a couple of days when she has had 2 or 3 consecutive days needing the use of the drug.  For the most part she is headache free.  Other than dry mouth, she tolerates imipramine 75 mg nightly without issue.    She did have a brief interval where she was being treated for a presumed sinus infection, on steroids and decongestants, the facial pressure and head pain remains though she completed the treatments themselves.  Imaging evidently revealed some type of inflammation.    Medical, surgical and family histories are reviewed, there are no new drug allergies.  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  She is on imipramine 75 mg nightly, Symbicort as needed, Wellbutrin and Cymbalta titration schedules.  She has Fioricet as needed.    Review of Systems   Gastrointestinal:  Negative for constipation.   Neurological:  Positive for headaches.   Psychiatric/Behavioral:  Negative for memory loss.    All  "other systems reviewed and are negative.    Objective     /78 (BP Location: Right arm, Patient Position: Sitting, BP Cuff Size: Adult)   Pulse (!) 107   Temp 36.4 °C (97.5 °F) (Temporal)   Ht 1.6 m (5' 3\")   Wt 68.4 kg (150 lb 12.7 oz)   SpO2 99%   BMI 26.71 kg/m²      Physical Exam    She appears in no acute distress.  Her vital signs are stable.  There is some mild tenderness of the jaw angle bilaterally, there is no jaw claudication.  The neck is supple, range of motion is full.  Cardiac evaluation is unremarkable.     Neurological Exam    Including mental status, cranial nerves, musculoskeletal, coordination, and since evaluations, her full neurologic examination is done reveals no evidence of deficits or toxicities.    Assessment & Plan     1. Migraine without aura and without status migrainosus, not intractable  We will continue the imipramine, I will provide Tofranil 75 mg tablets instead of the 25 mg generic formulations in the hopes of her pharmacy being able to fill a 90-day prescription without issue.  She will continue the Fioricet without issue, she is certainly not overusing the drug.  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  We will see each other in December when she is home for the holidays.    - imipramine (TOFRANIL) 75 MG Cap; Take 1 Capsule by mouth every evening.  Dispense: 90 Capsule; Refill: 3  - butalbital/apap/caffeine (FIORICET) -40 mg Tab; Take 1 Tablet by mouth every four hours as needed for Migraine for up to 62 days.  Dispense: 30 Tablet; Refill: 1    Time: 20 minutes in total spent in patient care including pre-charting, record review, discussion with healthcare staff and documentation.  This includes face-to-face time for exam, review, discussion, as well as counseling and coordinating care.            "

## 2023-05-16 DIAGNOSIS — F31.81 BIPOLAR 2 DISORDER (HCC): ICD-10-CM

## 2023-05-17 RX ORDER — DULOXETIN HYDROCHLORIDE 30 MG/1
30 CAPSULE, DELAYED RELEASE ORAL DAILY
Qty: 30 CAPSULE | Refills: 0 | Status: SHIPPED | OUTPATIENT
Start: 2023-05-17 | End: 2023-06-16

## 2023-05-17 NOTE — TELEPHONE ENCOUNTER
Pts insurance only covers 30 day supply asking us to cancel RX and resend with updated quantity.  Thank you    Received request via: Patient    Was the patient seen in the last year in this department? Yes    Does the patient have an active prescription (recently filled or refills available) for medication(s) requested? No    Does the patient have custodial Plus and need 100 day supply (blood pressure, diabetes and cholesterol meds only)? Medication is not for cholesterol, blood pressure or diabetes

## 2023-06-20 ENCOUNTER — TELEMEDICINE (OUTPATIENT)
Dept: BEHAVIORAL HEALTH | Facility: CLINIC | Age: 28
End: 2023-06-20
Payer: COMMERCIAL

## 2023-06-20 DIAGNOSIS — F31.81 BIPOLAR 2 DISORDER (HCC): ICD-10-CM

## 2023-06-20 PROCEDURE — 99214 OFFICE O/P EST MOD 30 MIN: CPT | Mod: GT,GC | Performed by: PSYCHIATRY & NEUROLOGY

## 2023-06-20 RX ORDER — DULOXETIN HYDROCHLORIDE 30 MG/1
30 CAPSULE, DELAYED RELEASE ORAL DAILY
Qty: 60 CAPSULE | Refills: 0 | Status: SHIPPED | OUTPATIENT
Start: 2023-06-20 | End: 2023-07-26
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
## 2023-07-26 ENCOUNTER — TELEMEDICINE (OUTPATIENT)
Dept: BEHAVIORAL HEALTH | Facility: CLINIC | Age: 28
End: 2023-07-26
Payer: COMMERCIAL

## 2023-07-26 ENCOUNTER — TELEPHONE (OUTPATIENT)
Dept: BEHAVIORAL HEALTH | Facility: CLINIC | Age: 28
End: 2023-07-26
Payer: COMMERCIAL

## 2023-07-26 DIAGNOSIS — F31.81 BIPOLAR 2 DISORDER (HCC): Primary | ICD-10-CM

## 2023-07-26 PROCEDURE — 99214 OFFICE O/P EST MOD 30 MIN: CPT | Mod: GT,GC | Performed by: STUDENT IN AN ORGANIZED HEALTH CARE EDUCATION/TRAINING PROGRAM

## 2023-07-26 RX ORDER — DULOXETIN HYDROCHLORIDE 30 MG/1
30 CAPSULE, DELAYED RELEASE ORAL DAILY
Qty: 90 CAPSULE | Refills: 0 | Status: SHIPPED | OUTPATIENT
Start: 2023-07-26 | End: 2023-10-24

## 2023-07-26 ASSESSMENT — ENCOUNTER SYMPTOMS
DIAPHORESIS: 0
HOPELESSNESS: 0
GASTROINTESTINAL NEGATIVE: 1
PALPITATIONS: 0
DECREASED ENERGY: 0
THOUGHT CONTENT - SHAME: 0
EYES NEGATIVE: 1
EUPHORIC MOOD: 0
MUSCULOSKELETAL NEGATIVE: 1
DIFFICULTY FALLING ASLEEP: 1

## 2023-07-26 NOTE — PROGRESS NOTES
"This evaluation was conducted via Zoom using secure and encrypted videoconferencing technology. The patient was in their home in the [facility].    The patient's identity was confirmed and verbal consent was obtained for this virtual visit.        INITIAL PSYCHIATRIC EVALUATION      This provider informed the patient their medical records are totally confidential except for the use by other providers involved in their care, or if the patient signs a release, or to report instances of child or elder abuse, or if it is determined they are an immediate risk to harm themselves or others.      CHIEF COMPLAINT  Medication       SUBJECTIVE  HISTORY OF PRESENT ILLNESS  Brenda Westbrook is a 28 y.o. old female comes in today to establish care and for evaluation of bipolar II disorder.  I did reviewed all outpatient psychiatry follow up notes over last 3 years.     Cymbalta - helping more than previous medications  Depressed mood for past week    Low mood  Low energy  Better today  Going away party last Saturday but didn't want to sociaize  Isolating  No recent SI  Postpartum period was last time she did have SI - kids are 7, 4, and 2  Today is first day of normal mood after recent week of depression    Manic symptoms - last time was when first starting Cymbalta - lasted few days  Feel like she doesn't see need for meds  Feel great  Drinking more than I should  Feel sober when when not - but no driving  Several days of feeling high then crash  Wellbutrin made mood less stable  Risk behaviors:  Few drinks on Saturday - first time in month  Almost impulsively quit job in the past \"Why work when life is so great\"    Anxiety manageable - related to 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  No recent headaches    Housing in [place] - has planned  Kids staying here with dad  Therapy with Dennis at [facility]  Will continue therapy    SLEEP Trouble falling asleep  Sometimes wide awake at 2 am  Sit on the couch  Play " relaxing music   DIET Normal diet   Support system , parents, boss           PSYCHIATRIC REVIEW OF SYSTEMS:      MOOD SYMPTOMS:   Pertinent negatives - not sad and no euphoric mood      ANXIETY:   Pertinent positives - depression/anxiety affecting progress    SLEEP:   Pertinent positives - difficulty falling asleep     PSYCHOMOTOR/ENERGY:   Pertinent negatives - no decreased energy    EATING:   COGNITIVE:   Pertinent negatives: no hopelessness and no shame     BEHAVIOR:   Pertinent positives - risky behavior    PSYCHOSIS:   Pertinent negatives - auditory hallucinations and visual hallucinations  SOCIAL:   Pertinent negatives - no social withdrawal, no lack of social reciprocity and social skills and no history of withdrawal symptoms    SENSORY:          REVIEW OF SYSTEMS    Review of Systems   Constitutional:  Negative for diaphoresis.   Eyes: Negative.    Cardiovascular:  Negative for chest pain and palpitations.   Gastrointestinal: Negative.    Musculoskeletal: Negative.             CURRENT MEDICATIONS:    Current Outpatient Medications:     DULoxetine, 30 mg, Oral, DAILY    meloxicam, 7.5 mg, Oral, DAILY    imipramine, 75 mg, Oral, Nightly    betamethasone (augmented), as needed.    albuterol, Take  by nebulization one time.    budesonide-formoterol, 2 Puff, Inhalation, BID        PAST PSYCHIATRIC MEDICATIONS CURRENT MEDICATIONS:   Wellbutrin  Celexa  Zoloft     Current Outpatient Medications:     DULoxetine, 30 mg, Oral, DAILY    meloxicam, 7.5 mg, Oral, DAILY    imipramine, 75 mg, Oral, Nightly    betamethasone (augmented), as needed.    albuterol, Take  by nebulization one time.    budesonide-formoterol, 2 Puff, Inhalation, BID       PAST MEDICAL HISTORY    Prior ? hospitalization: denies    Prior Self harm/suicide attempt: Previous SI with postpartum    Prior ? Diagnosis: depression, bipolar    Past Head Trauma/Epilepsy: denies    Past Medical History:   Diagnosis Date    Asthma, exercise induced      Bronchitis     Chlamydia     Oct 2011    Cold     02/6/15    Endometriosis     Head ache     Heart burn     Indigestion     Intractable migraine without aura and without status migrainosus 04/19/2017    Referral to neuro    Other specified symptom associated with female genital organs     endometriosis    Pain     pelvic    Pain 08/23/2012    stomach    Psychiatric problem     depression    Sleep apnea     tested twice - one pos, one neg    Sleep disturbances     Unspecified disorder of thyroid 2015    hyperactive in the past not taking any meds currently    Urinary bladder disorder     frequent uti's     Past Surgical History:   Procedure Laterality Date    RI CYSTOURETHROSCOPY N/A 4/12/2023    Procedure: CYSTOSCOPY;  Surgeon: Everardo Lara M.D.;  Location: [facility];  Service: Gen Robotic    RI TRANSPOSITION, OVARY(S) Bilateral 4/12/2023    Procedure: OOPHOROPEXY;  Surgeon: Everardo Lara M.D.;  Location: [facility];  Service: Gen Robotic    RI LAP,FULGURATE/EXCISE LESIONS N/A 4/12/2023    Procedure: FULGURATION, ENDOMETRIOSIS, LAPAROSCOPIC;  Surgeon: Everardo Lara M.D.;  Location: [facility];  Service: Gen Robotic    HYSTERECTOMY ROBOTIC XI N/A 4/12/2023    Procedure: ROBOTICALLY ASSISTED TOTAL LAPAROSCOPIC HYSTERECTOMY;  Surgeon: Everardo Lara M.D.;  Location: [facility];  Service: Gen Robotic    OVARIAN CYSTECTOMY Left 4/12/2023    Procedure: LEFT OVARIAN CYSTECTOMY;  Surgeon: Everardo Lara M.D.;  Location: [facility];  Service: Gen Robotic    PB KNEE SCOPE,MED/LAT MENISECTOMY Right 11/16/2022    Procedure: RIGHT KNEE ARTHROSCOPY, RIGHT LATERAL MENISCECTOMY AND HARDWARE REMOVAL REPAIRS AS INDICATED;  Surgeon: Rafat Alva M.D.;  Location: [facility] [place];  Service: Orthopedics    PB KNEE SCOPE,MED/LAT MENISECTOMY Right 3/14/2022    Procedure: RIGHT KNEE ARTHROSCOPY, CHONDROPLASTY;  Surgeon: Rafat Avla M.D.;  Location: [facility];  Service: Orthopedics    MENISCAL REPAIR Right 3/14/2022    Procedure: REPAIR, MENISCUS, KNEE-lateral;  Surgeon: Rafat Alva M.D.;  Location: [facility] [place];  Service: Orthopedics    REPEAT C SECTOIN WITH SALPINGECTOMY Bilateral 2021    Procedure:  SECTION, REPEAT, WITH SALPINGECTOMY;  Surgeon: Jorge Alvarenga M.D.;  [facility];  Service: Labor and Delivery    REPEAT C SECTION N/A 9/15/2018    Procedure: REPEAT C SECTION;  Surgeon: Rachna Patterson M.D.;  Location: [facility];  Service: Labor and Delivery    PRIMARY C SECTION Bilateral 2016    Procedure: PRIMARY C SECTION;  Surgeon: Trever Beckman M.D.;  Location: [facility];  Service:     MANNY BY LAPAROSCOPY  3/12/2015    Procedure: MANNY BY LAPAROSCOPY;  Surgeon: Sean Mitchell M.D.;  Location: [facility];  Service:     PELVISCOPY  2013    CYSTOSCOPY  2012    Performed by QUINTIN LOPES at SURGERY [facility]    PELVISCOPY  2012    Performed by CHRISTIANO PUGH at SURGERY SAME DAY [facility]    MYRINGOTOMY      STRABISMUS REPAIR      gerardo eyes    TONSILLECTOMY           ALLERGIES:  Ciprofloxacin, Codeine, Doxycycline, Keflex, and Morphine      SUBSTANCE USE HISTORY  ALCOHOL: few drinks at recent going away party - none in previous month  TOBACCO: denies  CAFFEINE: Recently came off of no caffeine for two months - now small amounts from soda  THC: denies  STIMULANTS: denies  HALLUCINOGENS: denies      FAMILY HISTORY    Psychiatric diagnosis: Mom and brother have depression and anxiety.  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  Musculoskeletal: Normal gait.   Abnormal movements: not noted - denies    MENTAL STATUS EXAMINATION    Psychiatric diagnosis: Mom and brother have depression and anxiety.  Grandmother had bipolar disorder; paternal aunt with bipolar disorder; cousin with ADHD  History of suicide attempts: No  Substance abuse history: No    General:   - Grooming and hygiene: Casual  - Apparent distress: NAD   - Behavior: Calm  - Eye Contact:  Good  - no psychomotor agitation or retardation   - Participation: Active verbal participation, Attentive, Engaged, and Open to feedback  Orientation:Alert and Fully Oriented to person, place and time  Mood: \"I don't really know - passive\"  Affect: Flexible, Full range, Congruent with content, and Congruent to stated mood  Thought Process: Linear, Logical, and Goal-directed  Thought Content: Within normal limits  Perception: Denies auditory or visual hallucinations. No delusions noted Within normal limits  Attention span and concentration: Intact   Speech:Clear with normal rate and rhythm  Language: Appropriate spontaneous, comprehends spoken commands, and fluent  Insight: Good  Judgment:  Good  Recent and remote memory: No gross evidence of memory deficits          SCREENINGS:      5/9/2021     1:01 PM 10/1/2021     2:20 PM 4/1/2022     1:40 PM   Depression Screen (PHQ-2/PHQ-9)   PHQ-2 Total Score 0     PHQ-2 Total Score  0 0            No data to display                0 (4/12/2023  6:47 AM)          MEDICAL RECORDS/LABS/DIAGNOSTIC TESTS REVIEWED:    Admission on 04/12/2023, Discharged on 04/12/2023   Component Date Value    Beta-Hcg Urine 04/12/2023 " Negative     Pathology Request 04/12/2023 Sent to [facility]    Appointment on 03/23/2023   Component Date Value    Color 03/23/2023 Yellow     Character 03/23/2023 Clear     Specific Gravity 03/23/2023 1.019     Ph 03/23/2023 6.5     Glucose 03/23/2023 Negative     Ketones 03/23/2023 Negative     Protein 03/23/2023 Negative     Bilirubin 03/23/2023 Negative     Urobilinogen, Urine 03/23/2023 1.0     Nitrite 03/23/2023 Negative     Leukocyte Esterase 03/23/2023 Negative     Occult Blood 03/23/2023 Negative     Micro Urine Req 03/23/2023 see below     ABO Grouping Only 03/23/2023 A     Rh Grouping Only 03/23/2023 POS     Antibody Screen Scrn 03/23/2023 NEG    Hospital Outpatient Visit on 03/17/2023   Component Date Value    Bhcg 03/17/2023 <1.0     TSH 03/17/2023 1.510     Sodium 03/17/2023 137     Potassium 03/17/2023 4.7     Chloride 03/17/2023 104     Co2 03/17/2023 22     Anion Gap 03/17/2023 11.0     Glucose 03/17/2023 100 (H)     Bun 03/17/2023 10     Creatinine 03/17/2023 0.87     Calcium 03/17/2023 9.5     AST(SGOT) 03/17/2023 9 (L)     ALT(SGPT) 03/17/2023 12     Alkaline Phosphatase 03/17/2023 65     Total Bilirubin 03/17/2023 0.2     Albumin 03/17/2023 4.4     Total Protein 03/17/2023 7.3     Globulin 03/17/2023 2.9     A-G Ratio 03/17/2023 1.5     WBC 03/17/2023 6.6     RBC 03/17/2023 4.36     Hemoglobin 03/17/2023 12.5     Hematocrit 03/17/2023 38.7     MCV 03/17/2023 88.8     MCH 03/17/2023 28.7     MCHC 03/17/2023 32.3 (L)     RDW 03/17/2023 54.9 (H)     Platelet Count 03/17/2023 284     MPV 03/17/2023 9.2     Neutrophils-Polys 03/17/2023 61.00     Lymphocytes 03/17/2023 30.60     Monocytes 03/17/2023 6.10     Eosinophils 03/17/2023 1.20     Basophils 03/17/2023 0.80     Immature Granulocytes 03/17/2023 0.30     Nucleated RBC 03/17/2023 0.00     Neutrophils (Absolute) 03/17/2023 4.03     Lymphs (Absolute) 03/17/2023 2.02     Monos (Absolute) 03/17/2023 0.40     Eos (Absolute) 03/17/2023 0.08     Baso  (Absolute) 03/17/2023 0.05     Immature Granulocytes (a* 03/17/2023 0.02     NRBC (Absolute) 03/17/2023 0.00     Correct Calcium 03/17/2023 9.2     GFR (CKD-EPI) 03/17/2023 92    Admission on 09/19/2022, Discharged on 09/19/2022   Component Date Value    WBC 09/19/2022 4.7 (L)     RBC 09/19/2022 4.18 (L)     Hemoglobin 09/19/2022 11.5 (L)     Hematocrit 09/19/2022 36.3 (L)     MCV 09/19/2022 86.8     MCH 09/19/2022 27.5     MCHC 09/19/2022 31.7 (L)     RDW 09/19/2022 48.6     Platelet Count 09/19/2022 265     MPV 09/19/2022 9.2     Neutrophils-Polys 09/19/2022 59.00     Lymphocytes 09/19/2022 32.00     Monocytes 09/19/2022 7.40     Eosinophils 09/19/2022 0.60     Basophils 09/19/2022 0.60     Immature Granulocytes 09/19/2022 0.40     Nucleated RBC 09/19/2022 0.00     Neutrophils (Absolute) 09/19/2022 2.78     Lymphs (Absolute) 09/19/2022 1.51     Monos (Absolute) 09/19/2022 0.35     Eos (Absolute) 09/19/2022 0.03     Baso (Absolute) 09/19/2022 0.03     Immature Granulocytes (a* 09/19/2022 0.02     NRBC (Absolute) 09/19/2022 0.00     Sodium 09/19/2022 140     Potassium 09/19/2022 3.3 (L)     Chloride 09/19/2022 105     Co2 09/19/2022 22     Anion Gap 09/19/2022 13.0     Glucose 09/19/2022 96     Bun 09/19/2022 8     Creatinine 09/19/2022 0.70     Calcium 09/19/2022 8.6     AST(SGOT) 09/19/2022 12     ALT(SGPT) 09/19/2022 12     Alkaline Phosphatase 09/19/2022 57     Total Bilirubin 09/19/2022 <0.2     Albumin 09/19/2022 4.3     Total Protein 09/19/2022 6.6     Globulin 09/19/2022 2.3     A-G Ratio 09/19/2022 1.9     Lipase 09/19/2022 32     Beta-Hcg Qualitative Ser* 09/19/2022 Negative     Color 09/19/2022 Yellow     Character 09/19/2022 Clear     Specific Gravity 09/19/2022 1.004     Ph 09/19/2022 6.0     Glucose 09/19/2022 Negative     Ketones 09/19/2022 Negative     Protein 09/19/2022 Negative     Bilirubin 09/19/2022 Negative     Urobilinogen, Urine 09/19/2022 0.2     Nitrite 09/19/2022 Negative     Leukocyte  Esterase 09/19/2022 Trace (A)     Occult Blood 09/19/2022 Negative     Micro Urine Req 09/19/2022 Microscopic     WBC 09/19/2022 0-2     RBC 09/19/2022 0-2     Bacteria 09/19/2022 Rare (A)     Epithelial Cells 09/19/2022 Few     Hyaline Cast 09/19/2022 0-2     GFR (CKD-EPI) 09/19/2022 120     C. trachomatis by PCR 09/19/2022 Negative     N. gonorrhoeae by PCR 09/19/2022 Negative     Source 09/19/2022 Endo/Cervical    Hospital Outpatient Visit on 03/08/2022   Component Date Value    SARS-CoV-2 Source 03/08/2022 Nasal Swab     SARS-CoV-2 by PCR 03/08/2022 NotDetected     COVID Order Status 03/08/2022 Received                  ===============================================================  ASSESSMENT  This is a 28 y.o. old female with a history of bipolar II disorder.  She recently started taking Cymbalta 30 mg po daily in May and reports that mood has been improved and more stable on this medication.  Finds it more helpful than previous medications.  She has a history of hypomanic episodes when mood feels great and associated impulsivity but most recent episode occurred right after starting Cymbalta and lasted a few days.  She recently experienced a low mood and low energy and isolating behaviors over the past week but mood has returned to normal as of today.  We discussed continuing the current dose of Cymbalta while the patient is moving and she agrees with this plan.  She will be continuing therapy after moving and plans to return to [place] regularly to see her kids and  and will schedule follow up appointments during these trips.    SAFETY ASSESSMENT - SELF:     Does patient acknowledge current or past symptoms of dangerousness to self? passive previous SI during postpartum but no plan or intent  History of suicide by family member: denies  History of suicide by friend/significant other: denies  Recent change in amount/specificity/intensity of suicidal thoughts or self-harm behavior? Denies recent  SI  Current access to firearms, medications, or other identified means of suicide/self-harm? n/a  If yes, willing to restrict access to means of suicide/self-harm? n/a  Protective factors present: yes     SAFETY ASSESSMENT - OTHERS:   Does patient acknowledge current or past symptoms of aggressive behavior or risk to others? denies  Recent change in amount/specificity/intensity of thoughts or threats to harm others? n/a  Current access to firearms/other identified means of harm? n/a  If yes, willing to restrict access to weapons/means of harm? n/a     CURRENT RISK:   Suicidal: Low  Homicidal: Low  Self-Harm: Low  Relapse: Not applicable  Crisis Safety Plan Reviewed: Not Indicated     DIAGNOSES  No diagnosis found.        PLAN:      Education:    Psychotherapy:    Labs/studies:       Medications:     Other:          Medication options, alternatives (including no medications) and medication risks/benefits/side effects were discussed in detail.  Explained importance of contraceptive measures while on psychotropic medications, educated to let provider know if ever pregnant or wanting to become pregnant. Verbalized understanding.  The patient was advised to call, message provider on [employer], or come in to the clinic if symptoms worsen or if any future questions/issues regarding their medications arise; the patient verbalized understanding and agreement.    The patient was educated to call 911, call the suicide hotline, or go to local ER if having thoughts of suicide or homicide; verbalized understanding.        Return to clinic in 8 weeks or sooner if symptoms worsen.  Next Appointment:  instruction provided on how to make the next appointment.     The proposed treatment plan was discussed with the patient who was provided the opportunity to ask questions and make suggestions regarding alternative treatment. Patient verbalized understanding and expressed agreement with the plan.         Aung Aly,  M.D.  07/26/23    CC:   SHEY [email or web address].    This note was created using voice recognition software (Dragon). The accuracy of the dictation is limited by the abilities of the software. I have reviewed the note prior to signing, however some errors in grammar and context are still possible. If you have any questions related to this note please do not hesitate to contact our office.

## 2023-09-16 ENCOUNTER — OFFICE VISIT (OUTPATIENT)
Dept: URGENT CARE | Facility: PHYSICIAN GROUP | Age: 28
End: 2023-09-16
Payer: COMMERCIAL

## 2023-09-16 VITALS
OXYGEN SATURATION: 100 % | DIASTOLIC BLOOD PRESSURE: 58 MMHG | HEART RATE: 107 BPM | HEIGHT: 63 IN | WEIGHT: 145 LBS | SYSTOLIC BLOOD PRESSURE: 112 MMHG | RESPIRATION RATE: 16 BRPM | BODY MASS INDEX: 25.69 KG/M2 | TEMPERATURE: 98.7 F

## 2023-09-16 DIAGNOSIS — U07.1 COVID: ICD-10-CM

## 2023-09-16 DIAGNOSIS — J98.8 RTI (RESPIRATORY TRACT INFECTION): ICD-10-CM

## 2023-09-16 LAB
FLUAV RNA SPEC QL NAA+PROBE: NEGATIVE
FLUBV RNA SPEC QL NAA+PROBE: NEGATIVE
RSV RNA SPEC QL NAA+PROBE: NEGATIVE
S PYO DNA SPEC NAA+PROBE: NOT DETECTED
SARS-COV-2 RNA RESP QL NAA+PROBE: POSITIVE

## 2023-09-16 PROCEDURE — 0241U POCT CEPHEID COV-2, FLU A/B, RSV - PCR: CPT | Performed by: PHYSICIAN ASSISTANT

## 2023-09-16 PROCEDURE — 87651 STREP A DNA AMP PROBE: CPT | Performed by: PHYSICIAN ASSISTANT

## 2023-09-16 PROCEDURE — 3078F DIAST BP <80 MM HG: CPT | Performed by: PHYSICIAN ASSISTANT

## 2023-09-16 PROCEDURE — 3074F SYST BP LT 130 MM HG: CPT | Performed by: PHYSICIAN ASSISTANT

## 2023-09-16 PROCEDURE — 99213 OFFICE O/P EST LOW 20 MIN: CPT | Performed by: PHYSICIAN ASSISTANT

## 2023-09-16 ASSESSMENT — ENCOUNTER SYMPTOMS
FEVER: 1
SWOLLEN GLANDS: 0
SHORTNESS OF BREATH: 0
ABDOMINAL PAIN: 0
NAUSEA: 0
CHILLS: 1
MYALGIAS: 1
SINUS PAIN: 1
WHEEZING: 0
HEADACHES: 1
COUGH: 1
DIARRHEA: 0
SPUTUM PRODUCTION: 0
VOMITING: 0
SORE THROAT: 1

## 2023-09-16 ASSESSMENT — FIBROSIS 4 INDEX: FIB4 SCORE: 0.27

## 2023-09-16 NOTE — PROGRESS NOTES
Subjective     Brenda Westbrook is a 28 y.o. female who presents with Pharyngitis (X3days. Congestion. Sinus pressure. Fever. Dry cough )            Pharyngitis   This is a new problem. Episode onset: 3 days. The problem has been unchanged. Maximum temperature: 100F. Associated symptoms include congestion (clear rhinorrhea), coughing and headaches. Pertinent negatives include no abdominal pain, diarrhea, ear pain, shortness of breath, swollen glands or vomiting. Associated symptoms comments: Sinus pressure.. She has tried nothing for the symptoms.         .  Past Medical History:   Diagnosis Date    Asthma, exercise induced     Bronchitis     Chlamydia     Oct 2011    Cold     02/6/15    Endometriosis     Head ache     Heart burn     Indigestion     Intractable migraine without aura and without status migrainosus 04/19/2017    Referral to neuro    Other specified symptom associated with female genital organs     endometriosis    Pain     pelvic    Pain 08/23/2012    stomach    Psychiatric problem     depression    Sleep apnea     tested twice - one pos, one neg    Sleep disturbances     Unspecified disorder of thyroid 2015    hyperactive in the past not taking any meds currently    Urinary bladder disorder     frequent uti's         Past Surgical History:   Procedure Laterality Date    GA CYSTOURETHROSCOPY N/A 4/12/2023    Procedure: CYSTOSCOPY;  Surgeon: Everardo Lara M.D.;  Location: [facility];  Service: Gen Robotic    GA TRANSPOSITION, OVARY(S) Bilateral 4/12/2023    Procedure: OOPHOROPEXY;  Surgeon: Everardo Lara M.D.;  Location: [facility];  Service: Gen Robotic    GA LAP,FULGURATE/EXCISE LESIONS N/A 4/12/2023    Procedure: FULGURATION, ENDOMETRIOSIS, LAPAROSCOPIC;  Surgeon: Everardo Lara M.D.;  Location: SURGERY [facility];  Service: Gen Robotic    HYSTERECTOMY ROBOTIC XI N/A 4/12/2023    Procedure: ROBOTICALLY ASSISTED TOTAL LAPAROSCOPIC HYSTERECTOMY;  Surgeon: Everardo Lara  M.D.;  Location: SURGERY [facility];  Service: Gen Robotic    OVARIAN CYSTECTOMY Left 2023    Procedure: LEFT OVARIAN CYSTECTOMY;  Surgeon: Everardo Lara M.D.;  Location: SURGERY [facility];  Service: Gen Robotic    PB KNEE SCOPE,MED/LAT MENISECTOMY Right 2022    Procedure: RIGHT KNEE ARTHROSCOPY, RIGHT LATERAL MENISCECTOMY AND HARDWARE REMOVAL REPAIRS AS INDICATED;  Surgeon: Rafat Alva M.D.;  Location: [facility];  Service: Orthopedics    PB KNEE SCOPE,MED/LAT MENISECTOMY Right 3/14/2022    Procedure: RIGHT KNEE ARTHROSCOPY, CHONDROPLASTY;  Surgeon: Rafat Alva M.D.;  Location: [facility];  Service: Orthopedics    MENISCAL REPAIR Right 3/14/2022    Procedure: REPAIR, MENISCUS, KNEE-lateral;  Surgeon: Rafat Alva M.D.;  Location: [facility];  Service: Orthopedics    REPEAT C SECTOIN WITH SALPINGECTOMY Bilateral 2021    Procedure:  SECTION, REPEAT, WITH SALPINGECTOMY;  Surgeon: Jorge Alvarenga M.D.;  [facility]: [facility];  Service: Labor and Delivery    REPEAT C SECTION N/A 9/15/2018    Procedure: REPEAT C SECTION;  Surgeon: Rachna Patterson M.D.;  Location: [facility];  Service: Labor and Delivery    PRIMARY C SECTION Bilateral 2016    Procedure: PRIMARY C SECTION;  Surgeon: Trever Beckman M.D.;  Location: [facility];  Service:     MANNY BY LAPAROSCOPY  3/12/2015    Procedure: MANNY BY LAPAROSCOPY;  Surgeon: Sean Mitchell M.D.;  Location: SURGERY [facility];  Service:     PELVISCOPY  2013    CYSTOSCOPY  2012    Performed by QUINTIN LOPES at SURGERY [facility]    PELVISCOPY  2012    Performed by CHRISTIANO PUGH at SURGERY SAME DAY [facility]    MYRINGOTOMY      STRABISMUS REPAIR      gerardo eyes    TONSILLECTOMY           Family History   Problem Relation Age of Onset    Other Mother         Migraine    Other Brother         Migraine  "   Diabetes Other     Hypertension Other     Stroke Other     Cancer Other     Heart Disease Other        Allergies:  Ciprofloxacin, Codeine, Doxycycline, Keflex, and Morphine      Medications, Allergies, and current problem list reviewed today in Epic    Review of Systems   Constitutional:  Positive for chills, fever and malaise/fatigue.   HENT:  Positive for congestion (clear rhinorrhea), sinus pain and sore throat. Negative for ear pain.    Respiratory:  Positive for cough. Negative for sputum production, shortness of breath and wheezing.    Cardiovascular:  Negative for chest pain and leg swelling.   Gastrointestinal:  Negative for abdominal pain, diarrhea, nausea and vomiting.   Musculoskeletal:  Positive for myalgias.   Skin:  Negative for rash.   Neurological:  Positive for headaches.     All other systems reviewed and are negative.            Objective     /58   Pulse (!) 107   Temp 37.1 °C (98.7 °F) (Temporal)   Resp 16   Ht 1.6 m (5' 3\")   Wt 65.8 kg (145 lb)   SpO2 100%   BMI 25.69 kg/m²      Physical Exam  Constitutional:       General: She is not in acute distress.     Appearance: She is not ill-appearing.   HENT:      Head: Normocephalic and atraumatic.      Right Ear: Tympanic membrane, ear canal and external ear normal.      Left Ear: Tympanic membrane, ear canal and external ear normal.      Nose: Congestion and rhinorrhea present.      Mouth/Throat:      Mouth: Mucous membranes are moist.      Pharynx: Posterior oropharyngeal erythema present. No oropharyngeal exudate.   Eyes:      Conjunctiva/sclera: Conjunctivae normal.   Cardiovascular:      Rate and Rhythm: Regular rhythm. Tachycardia present.      Heart sounds: Normal heart sounds.   Pulmonary:      Effort: Pulmonary effort is normal. No respiratory distress.      Breath sounds: Normal breath sounds. No wheezing, rhonchi or rales.   Lymphadenopathy:      Cervical: No cervical adenopathy.   Skin:     General: Skin is warm and dry. "   Neurological:      General: No focal deficit present.      Mental Status: She is alert and oriented to person, place, and time.   Psychiatric:         Mood and Affect: Mood normal.         Behavior: Behavior normal.         Thought Content: Thought content normal.         Judgment: Judgment normal.                           Assessment & Plan        1. RTI (respiratory tract infection)  POCT CEPHEID COV-2, FLU A/B, RSV - PCR    POCT GROUP A STREP, PCR      2. COVID  Nirmatrelvir&Ritonavir 300/100 20 x 150 MG & 10 x 100MG Tablet Therapy Pack          - POCT CEPHEID COV-2, FLU A/B, RSV - PCR- positive COVID  - POCT GROUP A STREP, PCR- negative    Viral etiology discussed. Continue conservative treatment.      Current Outpatient Medications:     Nirmatrelvir&Ritonavir 300/100 20 x 150 MG & 10 x 100MG Tablet Therapy Pack, Take 300 mg nirmatrelvir (two 150 mg tablets) with 100 mg ritonavir (one 100 mg tablet) by mouth, with all three tablets taken together twice daily for 5 days., Disp: 30 Each, Rfl: 0      Differential diagnoses, Supportive care, and indications for immediate follow-up discussed with patient.   Pathogenesis of diagnosis discussed including typical length and natural progression.   Instructed to return to clinic or nearest emergency department for any change in condition, further concerns, or worsening of symptoms.      The patient demonstrated a good understanding and agreed with the treatment plan.    Francesca Jaramillo P.A.-C.

## 2023-11-19 ENCOUNTER — OFFICE VISIT (OUTPATIENT)
Dept: URGENT CARE | Facility: PHYSICIAN GROUP | Age: 28
End: 2023-11-19
Payer: COMMERCIAL

## 2023-11-19 ENCOUNTER — HOSPITAL ENCOUNTER (OUTPATIENT)
Dept: RADIOLOGY | Facility: MEDICAL CENTER | Age: 28
End: 2023-11-19
Attending: NURSE PRACTITIONER
Payer: COMMERCIAL

## 2023-11-19 VITALS
OXYGEN SATURATION: 95 % | RESPIRATION RATE: 14 BRPM | BODY MASS INDEX: 25.69 KG/M2 | TEMPERATURE: 99.1 F | HEART RATE: 102 BPM | HEIGHT: 63 IN | WEIGHT: 145 LBS | SYSTOLIC BLOOD PRESSURE: 108 MMHG | DIASTOLIC BLOOD PRESSURE: 64 MMHG

## 2023-11-19 DIAGNOSIS — M25.551 CHRONIC PAIN OF RIGHT HIP: ICD-10-CM

## 2023-11-19 DIAGNOSIS — G89.29 CHRONIC PAIN OF RIGHT HIP: ICD-10-CM

## 2023-11-19 DIAGNOSIS — M25.551 ACUTE PAIN OF RIGHT HIP: ICD-10-CM

## 2023-11-19 DIAGNOSIS — M54.41 ACUTE RIGHT-SIDED LOW BACK PAIN WITH RIGHT-SIDED SCIATICA: ICD-10-CM

## 2023-11-19 PROCEDURE — 3074F SYST BP LT 130 MM HG: CPT | Performed by: NURSE PRACTITIONER

## 2023-11-19 PROCEDURE — 72202 X-RAY EXAM SI JOINTS 3/> VWS: CPT

## 2023-11-19 PROCEDURE — 73501 X-RAY EXAM HIP UNI 1 VIEW: CPT | Mod: RT

## 2023-11-19 PROCEDURE — 3078F DIAST BP <80 MM HG: CPT | Performed by: NURSE PRACTITIONER

## 2023-11-19 PROCEDURE — 99214 OFFICE O/P EST MOD 30 MIN: CPT | Performed by: NURSE PRACTITIONER

## 2023-11-19 RX ORDER — KETOROLAC TROMETHAMINE 30 MG/ML
30 INJECTION, SOLUTION INTRAMUSCULAR; INTRAVENOUS ONCE
Status: COMPLETED | OUTPATIENT
Start: 2023-11-19 | End: 2023-11-19

## 2023-11-19 RX ORDER — METHYLPREDNISOLONE 4 MG/1
TABLET ORAL
Qty: 21 TABLET | Refills: 0 | Status: SHIPPED | OUTPATIENT
Start: 2023-11-19

## 2023-11-19 RX ORDER — DULOXETIN HYDROCHLORIDE 30 MG/1
30 CAPSULE, DELAYED RELEASE ORAL DAILY
COMMUNITY
End: 2023-11-20 | Stop reason: SDUPTHER

## 2023-11-19 RX ADMIN — KETOROLAC TROMETHAMINE 30 MG: 30 INJECTION, SOLUTION INTRAMUSCULAR; INTRAVENOUS at 13:32

## 2023-11-19 ASSESSMENT — FIBROSIS 4 INDEX: FIB4 SCORE: 0.27

## 2023-11-19 NOTE — PROGRESS NOTES
Date: 11/19/23     Chief Complaint:    Chief Complaint   Patient presents with    Hip Injury     X1 week: Have an old hip injury that flares up, OTC not helping. R hip        History of Present Illness: 28 y.o.  female presents to clinic with 1 week history of right hip pain.  Patient states that she has a previous injury to her right hip/low back that was over 10 years ago.  She states it intermittently flares with pain although is usually manageable with over-the-counter ibuprofen Tylenol and warm baths.  She states over the past week the pain has not been alleviated by Tylenol ibuprofen and warm baths.  She describes the pain as right low back with pain radiating down the right side of her leg through her glue.  She denies any fevers body aches saddle anesthesia loss of bowel or bladder control or any repeat injury.      ROS:    As stated in HPI     Medical/SX/ Social History:  Reviewed per chart    Pertinent Medications:    Current Outpatient Medications on File Prior to Visit   Medication Sig Dispense Refill    DULoxetine (CYMBALTA) 20 MG Cap DR Particles Take 25 mg by mouth every day.      imipramine (TOFRANIL) 75 MG Cap Take 1 Capsule by mouth every evening. 90 Capsule 3    betamethasone, augmented, (DIPROLENE) 0.05 % gel as needed.      albuterol (PROVENTIL) 2.5 mg/0.5 mL Nebu Soln Take  by nebulization one time.      budesonide-formoterol (SYMBICORT) 80-4.5 MCG/ACT Aerosol Inhale 2 Puffs 2 times a day.      Nirmatrelvir&Ritonavir 300/100 20 x 150 MG & 10 x 100MG Tablet Therapy Pack Take 300 mg nirmatrelvir (two 150 mg tablets) with 100 mg ritonavir (one 100 mg tablet) by mouth, with all three tablets taken together twice daily for 5 days. 30 Each 0     No current facility-administered medications on file prior to visit.        Allergies:    Ciprofloxacin, Codeine, Doxycycline, Keflex, and Morphine     Problem list, medications, and allergies reviewed by myself today in Epic     Physical Exam:    Vitals:     "11/19/23 1300   BP: 108/64   Pulse: (!) 102   Resp: 14   Temp: 37.3 °C (99.1 °F)   SpO2: 95%             Physical Exam  Constitutional:       Appearance: Normal appearance.   HENT:      Head: Normocephalic and atraumatic.   Musculoskeletal:        Back:       Right hip: No tenderness or bony tenderness. Normal range of motion. Normal strength.   Neurological:      Mental Status: She is alert and oriented to person, place, and time.      Sensory: Sensation is intact.      Gait: Gait is intact.                  Diagnostics:    DX-SACROILIAC JOINTS 3+    Result Date: 11/19/2023 11/19/2023 1:43 PM HISTORY/REASON FOR EXAM:  Pain Following Trauma; previous injury ttp over right SI with sciatica s/p pregnancy. TECHNIQUE/EXAM DESCRIPTION AND NUMBER OF VIEWS:  3 view(s) of the sacroiliac joints. COMPARISON: None. FINDINGS: There are no fractures or dislocations.  No blastic or lytic lesions.  The sacral neural arches are intact. Again noted is a clip or staple like foreign body in the left pelvis.     Negative exam of the sacroiliac joints.    DX-HIP-UNILATERAL-WITH PELVIS-1 VIEW RIGHT    Result Date: 11/19/2023 11/19/2023 1:43 PM HISTORY/REASON FOR EXAM:  Pelvic/Hip Pain Following Trauma; chrnoci hip pain with acute exac, previous \"hairline fx\" of hip in 2014. TECHNIQUE/EXAM DESCRIPTION AND NUMBER OF VIEWS:  2 views of the RIGHT hip. COMPARISON: 9/19/2022 CT abdomen and pelvis FINDINGS: Again noted small clip or staple-like foreign body projects in the left pelvis. There is no fracture or dislocation. The visualized osseous structures are in anatomic alignment. Joint spaces are preserved. Bone mineralization is age-appropriate..     Negative right hip.      Diagnostics interpreted by myself, confirmed by radiology     Medical Decision making and plan :  I personally reviewed prior external notes and test results pertinent to today's visit. Pt is clinically stable at today's acute urgent care visit.  Patient appears " nontoxic with no acute distress noted. Appropriate for outpatient care at this time. The patient remained stable during the urgent care visit.     Pleasant 28 y.o. female presented clinic with chronic right low black/hip pain with an acute exacerbation.  No red flag findings on exam or HPI intake.  Discussed with patient I suspect likely SI joint versus low back pain.  Patient has no radiation of the pain to the right groin or hip.  Using shared decision making did obtain x-ray imaging fortunately both negative.  Patient was given in clinic Toradol upon discharge she did note an improvement in pain.  Did send for Medrol Dosepak advised ice with alternating heat and Tylenol for pain.  Referral to orthopedics placed  Shared decision-making was utilized with patient for treatment plan.  Differential Diagnosis, natural history, and supportive care discussed.      1. Chronic pain of right hip    - methylPREDNISolone (MEDROL DOSEPAK) 4 MG Tablet Therapy Pack; Follow schedule on package instructions.  Dispense: 21 Tablet; Refill: 0  - DX-SACROILIAC JOINTS 3+; Future  - DX-HIP-UNILATERAL-WITH PELVIS-1 VIEW RIGHT; Future  - Referral to Orthopedics    2. Acute pain of right hip    - ketorolac (Toradol) injection 30 mg  - methylPREDNISolone (MEDROL DOSEPAK) 4 MG Tablet Therapy Pack; Follow schedule on package instructions.  Dispense: 21 Tablet; Refill: 0  - DX-SACROILIAC JOINTS 3+; Future  - DX-HIP-UNILATERAL-WITH PELVIS-1 VIEW RIGHT; Future  - Referral to Orthopedics    3. Acute right-sided low back pain with right-sided sciatica    - Referral to Orthopedics        Medication discussed included indication for use and the potential benefits and side effects. Education was provided regarding the aforementioned assessments.  All of the patient's questions were answered to their satisfaction at the time of discharge. Patient was encouraged to monitor symptoms closely. Those signs and symptoms which would warrant concern and  mandate seeking a higher level of service through the emergency department discussed at length.  Patient stated agreement and understanding of this plan of care.    Disposition:  Home in stable condition       Voice Recognition Disclaimer:  Portions of this document were created using voice recognition software. The software does have a chance of producing errors of grammar and possibly content. I have made every reasonable attempt to correct obvious errors, but there may be errors of grammar and possibly content that I did not discover before finalizing the documentation.    Francesca Delgado, [email or web address].

## 2023-11-20 ENCOUNTER — OFFICE VISIT (OUTPATIENT)
Dept: BEHAVIORAL HEALTH | Facility: CLINIC | Age: 28
End: 2023-11-20
Payer: COMMERCIAL

## 2023-11-20 DIAGNOSIS — G47.00 INSOMNIA, UNSPECIFIED TYPE: ICD-10-CM

## 2023-11-20 DIAGNOSIS — F31.81 BIPOLAR 2 DISORDER (HCC): ICD-10-CM

## 2023-11-20 PROCEDURE — 99214 OFFICE O/P EST MOD 30 MIN: CPT | Mod: GC | Performed by: STUDENT IN AN ORGANIZED HEALTH CARE EDUCATION/TRAINING PROGRAM

## 2023-11-20 RX ORDER — LAMOTRIGINE 25 MG/1
TABLET ORAL
Qty: 42 TABLET | Refills: 0 | Status: SHIPPED | OUTPATIENT
Start: 2023-11-20 | End: 2023-12-18

## 2023-11-20 RX ORDER — LAMOTRIGINE 100 MG/1
100 TABLET ORAL DAILY
Qty: 30 TABLET | Refills: 0 | Status: SHIPPED | OUTPATIENT
Start: 2023-11-20 | End: 2024-01-03 | Stop reason: SDUPTHER

## 2023-11-20 RX ORDER — DULOXETIN HYDROCHLORIDE 30 MG/1
30 CAPSULE, DELAYED RELEASE ORAL DAILY
Qty: 30 CAPSULE | Refills: 1 | Status: SHIPPED | OUTPATIENT
Start: 2023-11-20 | End: 2024-01-03 | Stop reason: SDUPTHER

## 2023-11-20 ASSESSMENT — PATIENT HEALTH QUESTIONNAIRE - PHQ9: CLINICAL INTERPRETATION OF PHQ2 SCORE: 0

## 2023-11-20 NOTE — PROGRESS NOTES
Evaluation completed by: Aung Aly M.D.   Date of Service: 11/20/2023   Appointment type: in-office appointment.    Information below was collected from: patient      CHIEF COMPLIANT:  Medication Management        HPI:   Brenda Westbrook is a 28 y.o. old female who presents today for follow up appointment to address Medication Management  History of bipolar disorder that was previously diagnosed based on some impulsivity after starting duloxetine.  Has been managed without mood stabilizer.  Patient continues to have some trouble with sleep and energy.  She is only able to sleep a few hours nightly despite feeling tired.  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  They have stayed in [place] due to grandparents on both sides living nearby and 's job (hybrid in person/virtual).  Denies sad mood or anhedonia.  She had a hysterectomy after last pregnancy.      CURRENT MEDICATIONS    Current Outpatient Medications:     DULoxetine (CYMBALTA) 30 MG Cap DR Particles, Take 30 mg by mouth every day. Indications: Generalized Anxiety Disorder, Disp: , Rfl:     imipramine (TOFRANIL) 75 MG Cap, Take 1 Capsule by mouth every evening., Disp: 90 Capsule, Rfl: 3    methylPREDNISolone (MEDROL DOSEPAK) 4 MG Tablet Therapy Pack, Follow schedule on package instructions., Disp: 21 Tablet, Rfl: 0    Nirmatrelvir&Ritonavir 300/100 20 x 150 MG & 10 x 100MG Tablet Therapy Pack, Take 300 mg nirmatrelvir (two 150 mg tablets) with 100 mg ritonavir (one 100 mg tablet) by mouth, with all three tablets taken together twice daily for 5 days., Disp: 30 Each, Rfl: 0    betamethasone, augmented, (DIPROLENE) 0.05 % gel, as needed., Disp: , Rfl:     albuterol (PROVENTIL) 2.5 mg/0.5 mL Nebu Soln, Take  by nebulization one time., Disp: , Rfl:     budesonide-formoterol (SYMBICORT) 80-4.5 MCG/ACT Aerosol, Inhale 2 Puffs 2 times a day., Disp: , Rfl:     Psychiatric  Review of Systems:current symptoms as reported by pt.  Depression: see HPI  Awilda: denies current or recent elevated mood, energy, impulsivity  Anxiety/Panic Attacks: insomnia, feelings of losing control  Sleep:  can't sleep - few hours nightly  Behavioral/Aggression: Calm  Substance Use: social alcohol use, denies cannabis use    MEDICAL REVIEW OF SYSTEMS   Gastrointestinal: Negative for nausea, vomiting, diarrhea, constipation, blood in stool  Musculoskeletal: muscle pain - same timeframe as insomnia  Neurological:  dizziness 3-4 times monthly , headaches      MEDICAL HISTORY  Past Medical History:   Diagnosis Date    Asthma, exercise induced     Bronchitis     Chlamydia     Oct 2011    Cold     02/6/15    Endometriosis     Head ache     Heart burn     Indigestion     Intractable migraine without aura and without status migrainosus 04/19/2017    Referral to neuro    Other specified symptom associated with female genital organs     endometriosis    Pain     pelvic    Pain 08/23/2012    stomach    Psychiatric problem     depression    Sleep apnea     tested twice - one pos, one neg    Sleep disturbances     Unspecified disorder of thyroid 2015    hyperactive in the past not taking any meds currently    Urinary bladder disorder     frequent uti's     Allergies:   Allergies   Allergen Reactions    Ciprofloxacin Vomiting    Codeine Hives     Tolerates oxycodone and hydromorphone    Doxycycline Itching and Vomiting    Keflex Itching    Morphine Unspecified and Hives     Patient reports could not breath.        SURGICAL HISTORY  Past Surgical History:   Procedure Laterality Date    MD CYSTOURETHROSCOPY N/A 4/12/2023    Procedure: CYSTOSCOPY;  Surgeon: Everardo Lara M.D.;  Location: SURGERY [facility];  Service: Gen Robotic    MD TRANSPOSITION, OVARY(S) Bilateral 4/12/2023    Procedure: OOPHOROPEXY;  Surgeon: Everardo Lara M.D.;  Location: SURGERY [facility];  Service: Gen Robotic    MD LAP,FULGURATE/EXCISE LESIONS  N/A 2023    Procedure: FULGURATION, ENDOMETRIOSIS, LAPAROSCOPIC;  Surgeon: Everardo Lara M.D.;  Location: SURGERY [facility];  Service: Gen Robotic    HYSTERECTOMY ROBOTIC XI N/A 2023    Procedure: ROBOTICALLY ASSISTED TOTAL LAPAROSCOPIC HYSTERECTOMY;  Surgeon: Everardo Lara M.D.;  Location: SURGERY [facility];  Service: Gen Robotic    OVARIAN CYSTECTOMY Left 2023    Procedure: LEFT OVARIAN CYSTECTOMY;  Surgeon: Everardo Lara M.D.;  Location: SURGERY [facility];  Service: Gen Robotic    PB KNEE SCOPE,MED/LAT MENISECTOMY Right 2022    Procedure: RIGHT KNEE ARTHROSCOPY, RIGHT LATERAL MENISCECTOMY AND HARDWARE REMOVAL REPAIRS AS INDICATED;  Surgeon: Rafat Alva M.D.;  Location: [facility];  Service: Orthopedics    PB KNEE SCOPE,MED/LAT MENISECTOMY Right 3/14/2022    Procedure: RIGHT KNEE ARTHROSCOPY, CHONDROPLASTY;  Surgeon: Rafat Alva M.D.;  Location: [facility];  Service: Orthopedics    MENISCAL REPAIR Right 3/14/2022    Procedure: REPAIR, MENISCUS, KNEE-lateral;  Surgeon: Rafat Alva M.D.;  Location: [facility];  Service: Orthopedics    REPEAT C SECTOIN WITH SALPINGECTOMY Bilateral 2021    Procedure:  SECTION, REPEAT, WITH SALPINGECTOMY;  Surgeon: Jorge Alvarenga M.D.;  [facility];  Service: Labor and Delivery    REPEAT C SECTION N/A 9/15/2018    Procedure: REPEAT C SECTION;  Surgeon: Rachna Patterson M.D.;  Location: [facility];  Service: Labor and Delivery    PRIMARY C SECTION Bilateral 2016    Procedure: PRIMARY C SECTION;  Surgeon: Trever Beckman M.D.;  Location: [facility];  Service:     MANNY BY LAPAROSCOPY  3/12/2015    Procedure: MANNY BY LAPAROSCOPY;  Surgeon: Sean Mitchell M.D.;  Location: [facility];  Service:     PELVISCOPY  2013    CYSTOSCOPY  2012    Performed by QUINTIN LOPES at [facility]  ORS    PELVISCOPY  4/19/2012    Performed by CHRISTIANO PUGH at SURGERY SAME DAY [facility] ORS    MYRINGOTOMY      STRABISMUS REPAIR      gerardo eyes    TONSILLECTOMY          FAMILY PSYCHIATRIC HISTORY  Family History   Problem Relation Age of Onset    Other Mother         Migraine    Other Brother         Migraine    Diabetes Other     Hypertension Other     Stroke Other     Cancer Other     Heart Disease Other        SOCIAL HISTORY  Reviewed with patient and no changes.  Continues to attend [employer] school in [place] and travels home for family visits.    SUBSTANCE USE HISTORY  Alcohol: social  Tobacco: Patient denies the use of tobacco products  Cannabis: denies use of marijuana products  Opioids: denies  Other prescription medications: denies  Other: denies    PSYCHIATRIC EXAMINATION   Mental Status Exam    Appearance: Appropriate dress and grooming  Sensorium: Alert and Oriented X 4  Behavior: Appropriate  Motor Activity: Unremarkable  Eye Contact: Good  Speech: Normal (Comment: increased rate)  Mood: Neutral  Affect: Congruent with normal range  Thought Flow: Linear  Thought Content: Unremarkable  Suicidality: Denies  Hallucinations: Denies  Cognition: Normal  Insight: Intact  Reliability: Apparently reliable  Judgement: Good            CURRENT RISK ASSESSMENT       Suicide: Low       Homicide: Low       Self-Harm: Low       Relapse: Not applicable       Crisis Safety Plan Reviewed Not Indicated    [place]  records   reviewed.  No concerns about misuse of controlled substance.    ASSESSMENT  Brenda Westbrook is a 28 y.o. old female who presents today for follow up appointment to address Medication Management  Brenda Westbrook is a 28 y.o. old female who presents today for follow up appointment to address Medication Management  History of bipolar disorder that was previously diagnosed based on some impulsivity after starting duloxetine.  Has been managed without mood stabilizer.  Patient continues to have  some trouble with sleep and energy.  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  They have stayed in [place] due to grandparents on both sides living nearby and 's job (hybrid in person/virtual).  Denies sad mood or anhedonia.  She had a hysterectomy after last pregnancy.  Other history includes migraines and knee injury last year with ongoing pain.  Possible diagnosis of sleep apnea in the past (1 out of 2 sleep studies was positive). She recalls that imipramine (current prescribed to treat migraine headaches) caused some sedation when starting but does not recall this effect from duloxetine.  Given the history of bipolar diagnosis, ongoing trouble with insomnia despite feeling tired, stress related to law school, and chronic pain, we will try starting Lamictal to help with mood, sleep, and pain, while avoiding risk of manic switch.  Discussed taper schedule with goal dose of 100 mg po daily.  At our follow up appointment, we will schedule 60 minutes to further reassess the diagnosis of bipolar disorder, since she has not required a mood stabilizer to manage mood and use of antidepressants has not caused manic switch with full criteria (based on limited discussion today).    DIAGNOSES/PLAN  Encounter Diagnoses   Name Primary?    Bipolar 2 disorder (HCC)     Depression, unspecified depression type      -Continue duloxetine DR 60 mg po daily  -Start Lamictal: 25 mg po daily for two weeks, then increase to 50 mg po daily for two weeks, then increase to 100 mg po daily.  Discussed risk of Mihai-Romulo Syndrome, including symptoms and the importance of seeking immediate medical care if symptoms appear.  -Also taking imipramine for migraines.      Medication options, alternatives (including no medications) and medication risks/benefits/side effects were discussed in detail.  The patient was advised to call, message clinician on [employer], or  come in to the clinic if symptoms worsen or if questions/issues regarding their medications arise.  The patient verbalized understanding and agreement.    The patient was educated to call 911, call the suicide hotline, or go to the local ER if having thoughts of suicide or homicide.  The patient verbalized understanding and agreement.   The proposed treatment plan was discussed with the patient who was provided the opportunity to ask questions and make suggestions regarding alternative treatment. Patient verbalized understanding and expressed agreement with the plan.      Return to clinic in 6 weeks or sooner if symptoms worsen.  Will schedule a 60 minute appointment for follow up to reassess bipolar diagnosis versus major depressive disorder.    This appointment was supervised by attending psychiatrist, who agrees with assessment and treatment plan.  See attending attestation for more details.     Aung Aly M.D.

## 2023-11-22 PROBLEM — G47.00 INSOMNIA: Status: ACTIVE | Noted: 2023-11-22

## 2023-11-22 PROBLEM — F32.A DEPRESSION: Status: ACTIVE | Noted: 2023-11-22

## 2023-11-22 PROBLEM — Z90.49 STATUS POST CHOLECYSTECTOMY: Status: ACTIVE | Noted: 2023-11-22

## 2023-11-22 PROBLEM — F33.1 MODERATE EPISODE OF RECURRENT MAJOR DEPRESSIVE DISORDER (HCC): Status: RESOLVED | Noted: 2022-12-02 | Resolved: 2023-11-22

## 2023-11-22 PROBLEM — K21.9 GERD (GASTROESOPHAGEAL REFLUX DISEASE): Status: ACTIVE | Noted: 2023-11-22

## 2023-11-22 NOTE — ADDENDUM NOTE
Addended by: FRANKI CHISHOLM on: 11/22/2023 08:06 AM     Modules accepted: Orders, Level of Service

## 2024-01-03 ENCOUNTER — OFFICE VISIT (OUTPATIENT)
Dept: BEHAVIORAL HEALTH | Facility: CLINIC | Age: 29
End: 2024-01-03
Payer: COMMERCIAL

## 2024-01-03 DIAGNOSIS — G47.00 INSOMNIA, UNSPECIFIED TYPE: ICD-10-CM

## 2024-01-03 DIAGNOSIS — F31.81 BIPOLAR 2 DISORDER (HCC): ICD-10-CM

## 2024-01-03 DIAGNOSIS — F32.A DEPRESSION, UNSPECIFIED DEPRESSION TYPE: ICD-10-CM

## 2024-01-03 PROCEDURE — 99999 PR NO CHARGE: CPT | Performed by: PSYCHIATRY & NEUROLOGY

## 2024-01-03 RX ORDER — TRAZODONE HYDROCHLORIDE 50 MG/1
100 TABLET ORAL NIGHTLY
Qty: 60 TABLET | Refills: 2 | Status: SHIPPED | OUTPATIENT
Start: 2024-01-03 | End: 2024-04-02

## 2024-01-03 RX ORDER — DULOXETIN HYDROCHLORIDE 30 MG/1
30 CAPSULE, DELAYED RELEASE ORAL EVERY EVENING
Qty: 30 CAPSULE | Refills: 2 | Status: SHIPPED | OUTPATIENT
Start: 2024-01-03 | End: 2024-02-02 | Stop reason: DRUGHIGH

## 2024-01-03 RX ORDER — LAMOTRIGINE 100 MG/1
100 TABLET ORAL DAILY
Qty: 30 TABLET | Refills: 2 | Status: SHIPPED | OUTPATIENT
Start: 2024-01-03 | End: 2024-03-12 | Stop reason: SDUPTHER

## 2024-01-04 ENCOUNTER — OFFICE VISIT (OUTPATIENT)
Dept: NEUROLOGY | Facility: MEDICAL CENTER | Age: 29
End: 2024-01-04
Attending: PSYCHIATRY & NEUROLOGY
Payer: COMMERCIAL

## 2024-01-04 ENCOUNTER — TELEPHONE (OUTPATIENT)
Dept: NEUROLOGY | Facility: MEDICAL CENTER | Age: 29
End: 2024-01-04

## 2024-01-04 VITALS
BODY MASS INDEX: 27.62 KG/M2 | OXYGEN SATURATION: 98 % | DIASTOLIC BLOOD PRESSURE: 76 MMHG | HEART RATE: 105 BPM | SYSTOLIC BLOOD PRESSURE: 118 MMHG | TEMPERATURE: 99 F | HEIGHT: 63 IN | WEIGHT: 155.87 LBS

## 2024-01-04 DIAGNOSIS — G43.009 MIGRAINE WITHOUT AURA AND WITHOUT STATUS MIGRAINOSUS, NOT INTRACTABLE: ICD-10-CM

## 2024-01-04 DIAGNOSIS — G43.009 MIGRAINE WITHOUT AURA AND WITHOUT STATUS MIGRAINOSUS, NOT INTRACTABLE: Primary | ICD-10-CM

## 2024-01-04 PROCEDURE — 3074F SYST BP LT 130 MM HG: CPT | Performed by: PSYCHIATRY & NEUROLOGY

## 2024-01-04 PROCEDURE — 99212 OFFICE O/P EST SF 10 MIN: CPT | Performed by: PSYCHIATRY & NEUROLOGY

## 2024-01-04 PROCEDURE — 99213 OFFICE O/P EST LOW 20 MIN: CPT | Performed by: PSYCHIATRY & NEUROLOGY

## 2024-01-04 PROCEDURE — 3078F DIAST BP <80 MM HG: CPT | Performed by: PSYCHIATRY & NEUROLOGY

## 2024-01-04 RX ORDER — RIMEGEPANT SULFATE 75 MG/75MG
1 TABLET, ORALLY DISINTEGRATING ORAL PRN
Qty: 10 TABLET | Refills: 5 | Status: SHIPPED | OUTPATIENT
Start: 2024-01-04 | End: 2024-01-04 | Stop reason: SDUPTHER

## 2024-01-04 RX ORDER — IMIPRAMINE PAMOATE 75 MG
150 CAPSULE ORAL NIGHTLY
Qty: 180 CAPSULE | Refills: 5 | Status: SHIPPED | OUTPATIENT
Start: 2024-01-04 | End: 2024-01-12

## 2024-01-04 RX ORDER — RIMEGEPANT SULFATE 75 MG/75MG
1 TABLET, ORALLY DISINTEGRATING ORAL
Qty: 15 TABLET | Refills: 5 | Status: SHIPPED | OUTPATIENT
Start: 2024-01-04

## 2024-01-04 ASSESSMENT — ENCOUNTER SYMPTOMS
HEADACHES: 1
PHOTOPHOBIA: 1

## 2024-01-04 ASSESSMENT — FIBROSIS 4 INDEX: FIB4 SCORE: 0.27

## 2024-01-04 NOTE — PROGRESS NOTES
Subjective     Brenda Westbrook is a 28 y.o. female who presents for follow-up, with a history of migraine without aura, but whose headaches have kicked up a notch dating back several months.     ROSE MARY Gutierrez states that her headaches actually had come under good control when she was last seen in May, 2023.  At that time she had already been on imipramine 75 mg nightly with good tolerability, providing consistent benefit.  We then added Qulipta 60 mg daily, and for the next month or so, she actually had very few headaches.  Things then started to go south once again, and she is now having daily headaches, several days every week she is having to reach for rescue.  The Treximet had worked in the past but the price proved exorbitant for her.  She now has Anaprox alone which does nothing.  The headaches had been so infrequent we then placed her on Fioricet, the drug worked effectively until headache started getting worse, now she simply has stopped taking the drug.    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    During this time her psychotropic medication regimen has also been adjusted, she went down and then back up on Cymbalta with no change in the headaches.  Lamictal was then added, increasing slowly from 25 mg to 100 mg nightly.  There has been no change in the headaches.  The drug is helping her mood as is the Cymbalta.    Medical, surgical and family histories are reviewed, there are no new drug allergies.  [employer] is a bit of a burden, but she is enjoying it.  She is on Tofranil 75 mg nightly, was just started on trazodone 100 mg nightly, lamotrigine 100 mg daily, Cymbalta 30 mg daily, Symbicort and Proventil inhalers as  "needed.  There is also the Tylenol and Excedrin regimen as above.    Review of Systems   Constitutional:  Positive for malaise/fatigue.   Eyes:  Positive for photophobia.   Neurological:  Positive for headaches.   All other systems reviewed and are negative.    Objective     /76 (BP Location: Right arm, Patient Position: Sitting, BP Cuff Size: Adult)   Pulse (!) 105   Temp 37.2 °C (99 °F) (Temporal)   Ht 1.6 m (5' 3\")   Wt 70.7 kg (155 lb 13.8 oz)   SpO2 98%   BMI 27.61 kg/m²      Physical Exam    She appears in some mild distress, she is complaining of moderate headache pain with light sensitivity. She is cooperative.  Vital signs are stable, pulse is slightly elevated at 105.  Rhythm is regular.  There is no malar rash or jaw claudication.  The neck is supple, range of motion is full.  Cardiac evaluation is unremarkable.  There is no lower extremity edema.      Neurological Exam    Fully oriented, there is no aphasia, apraxia, or inattention.    With quick observation, PERRLA/EOMI, visual fields are grossly full, facial movements are symmetric and there is no bulbar dysfunction.  Shoulder shrug and head rotation are symmetric.  She moves all 4 extremities equally, there is no tremor or clear tonal abnormality.  There is no appendicular dystaxia with any of the extremities.  Fine motor control with the hands and fingers is normal.  She stands easily, gait is normal and station and stride length.  Sensory and reflex exam is deferred.    Assessment & Plan     1. Migraine without aura and without status migrainosus, not intractable  With no clear cause for the sudden headache escalation, we are going to have to push the medication she is already on, Tofranil providing benefit, she will hold the trazodone further, and I will double the dose of the former to 150 mg nightly.  Side effects were reviewed.  For rescue, Nurtec ODT 75 mg will be used.  If effective, this also can be converted to a maintenance " therapy of every other day regularity.  We will communicate via [employer], we will follow-up this coming May or June when she is back home from school.      - Rimegepant Sulfate (NURTEC) 75 MG TABLET DISPERSIBLE; Take 1 Tablet by mouth as needed (Headache). 1 tab at headache onset; repeat in 24 hours  Dispense: 10 Tablet; Refill: 5  - imipramine (TOFRANIL) 75 MG Cap; Take 2 Capsules by mouth every evening.  Dispense: 180 Capsule; Refill: 5    Time: 20 minutes in total spent on patient care including pre-charting, record review, discussion with healthcare staff and documentation.  This includes face-to-face time for exam, review, discussion, as well as counseling and coordinating care.

## 2024-01-04 NOTE — TELEPHONE ENCOUNTER
Received [employer] PA request via [employer]  for Nurtec 75 MG TBDP. (Quantity:10, Day Supply:30) - Copay $341.68 (After $850.00 [employer] Voucher please mention voucher to patient) No PA req'd     Insurance: [employer]  Member ID:  [id number]  BIN: [id number]  PCN: [id number]  Group: [id number]     Ran Test claim via [employer] [facility] medication Pays for a $341.68 copay. Will outreach to patient to offer specialty pharmacy services and or release to preferred pharmacy

## 2024-01-07 ASSESSMENT — ANXIETY QUESTIONNAIRES
IF YOU CHECKED OFF ANY PROBLEMS ON THIS QUESTIONNAIRE, HOW DIFFICULT HAVE THESE PROBLEMS MADE IT FOR YOU TO DO YOUR WORK, TAKE CARE OF THINGS AT HOME, OR GET ALONG WITH OTHER PEOPLE: NOT DIFFICULT AT ALL
GAD7 TOTAL SCORE: 2
6. BECOMING EASILY ANNOYED OR IRRITABLE: SEVERAL DAYS
2. NOT BEING ABLE TO STOP OR CONTROL WORRYING: NOT AT ALL
3. WORRYING TOO MUCH ABOUT DIFFERENT THINGS: NOT AT ALL
7. FEELING AFRAID AS IF SOMETHING AWFUL MIGHT HAPPEN: NOT AT ALL
1. FEELING NERVOUS, ANXIOUS, OR ON EDGE: SEVERAL DAYS
5. BEING SO RESTLESS THAT IT IS HARD TO SIT STILL: NOT AT ALL
4. TROUBLE RELAXING: NOT AT ALL

## 2024-01-07 ASSESSMENT — PATIENT HEALTH QUESTIONNAIRE - PHQ9
CLINICAL INTERPRETATION OF PHQ2 SCORE: 0
5. POOR APPETITE OR OVEREATING: 1 - SEVERAL DAYS

## 2024-01-08 NOTE — PROGRESS NOTES
Evaluation completed by: Aung Aly M.D.   Date of Service: 1/3/2024   Appointment type: in-office appointment.    Information below was collected from: patient      CHIEF COMPLIANT:  Medication Management    HPI:   Brenda Westbrook is a 28 y.o. old female who presents today for follow up appointment to address Medication Management  Patient reports improvement since last visit.  She denies adverse effects to Lamictal.  She has noticed that her mood seems to be more even and denies irritability.  She continues to have some trouble falling asleep at night.  Her anxiety has been well-controlled.  She says that her brother has had success taking trazodone.    Depression Screening    Little interest or pleasure in doing things?  0 - not at all  Feeling down, depressed , or hopeless? 0 - not at all  Patient Health Questionnaire Score: 0    If depressive symptoms identified deferred to follow up visit unless specifically addressed in assesment and plan.      Interpretation of PHQ-9 Total Score   Score Severity   1-4 Minimal Depression   5-9 Mild Depression   10-14 Moderate Depression   15-19 Moderately Severe Depression   20-27 Severe Depression     JAMAL-7 Questionnaire    Feeling nervous, anxious, or on edge: Several days  Not being able to sop or control worrying: Not at all  Worrying too much about different things: Not at all  Trouble relaxing: Not at all  Being so restless that it's hard to sit still: Not at all  Becoming easily annoyed or irritable: Several days  Feeling afraid as if something awful might happen: Not at all  Total: 2    Interpretation of JAMAL 7 Total Score   Score Severity :  0-4 No Anxiety   5-9 Mild Anxiety  10-14 Moderate Anxiety  15-21 Severe Anxiety     CURRENT MEDICATIONS    Current Outpatient Medications:     Rimegepant Sulfate (NURTEC) 75 MG TABLET DISPERSIBLE, Take 1 Tablet by mouth every 48 hours., Disp: 15 Tablet, Rfl: 5    traZODone (DESYREL) 50 MG Tab, Take 2 Tablets by mouth  every evening for 90 days. Take 1/2 to 1 tablet by mouth at bedtime for 2 weeks then increase to 100 mg po at bedtime for insomnia., Disp: 60 Tablet, Rfl: 2    lamoTRIgine (LAMICTAL) 100 MG Tab, Take 1 Tablet by mouth every day for 90 days., Disp: 30 Tablet, Rfl: 2    DULoxetine (CYMBALTA) 30 MG Cap DR Particles, Take 1 Capsule by mouth every evening for 90 days. Indications: Generalized Anxiety Disorder, Disp: 30 Capsule, Rfl: 2    methylPREDNISolone (MEDROL DOSEPAK) 4 MG Tablet Therapy Pack, Follow schedule on package instructions., Disp: 21 Tablet, Rfl: 0    imipramine (TOFRANIL) 75 MG Cap, Take 2 Capsules by mouth every evening., Disp: 180 Capsule, Rfl: 5    betamethasone, augmented, (DIPROLENE) 0.05 % gel, as needed., Disp: , Rfl:     albuterol (PROVENTIL) 2.5 mg/0.5 mL Nebu Soln, Take  by nebulization one time., Disp: , Rfl:     budesonide-formoterol (SYMBICORT) 80-4.5 MCG/ACT Aerosol, Inhale 2 Puffs 2 times a day., Disp: , Rfl:         MEDICAL HISTORY  Past Medical History:   Diagnosis Date    Asthma, exercise induced     Bronchitis     Chlamydia     Oct 2011    Cold     02/6/15    Endometriosis     Head ache     Heart burn     Indigestion     Intractable migraine without aura and without status migrainosus 04/19/2017    Referral to neuro    Other specified symptom associated with female genital organs     endometriosis    Pain     pelvic    Pain 08/23/2012    stomach    Psychiatric problem     depression    Sleep apnea     tested twice - one pos, one neg    Sleep disturbances     Unspecified disorder of thyroid 2015    hyperactive in the past not taking any meds currently    Urinary bladder disorder     frequent uti's     Allergies:   Allergies   Allergen Reactions    Ciprofloxacin Vomiting    Codeine Hives     Tolerates oxycodone and hydromorphone    Doxycycline Itching and Vomiting    Keflex Itching    Morphine Unspecified and Hives     Patient reports could not breath.        SURGICAL HISTORY  Past  Surgical History:   Procedure Laterality Date    RI CYSTOURETHROSCOPY N/A 2023    Procedure: CYSTOSCOPY;  Surgeon: Everardo Lara M.D.;  Location: SURGERY [facility];  Service: Gen Robotic    RI TRANSPOSITION, OVARY(S) Bilateral 2023    Procedure: OOPHOROPEXY;  Surgeon: Everardo Lara M.D.;  Location: [facility];  Service: Gen Robotic    RI LAP,FULGURATE/EXCISE LESIONS N/A 2023    Procedure: FULGURATION, ENDOMETRIOSIS, LAPAROSCOPIC;  Surgeon: Everardo Lara M.D.;  Location: SURGERY [facility];  Service: Gen Robotic    HYSTERECTOMY ROBOTIC XI N/A 2023    Procedure: ROBOTICALLY ASSISTED TOTAL LAPAROSCOPIC HYSTERECTOMY;  Surgeon: Everardo Lara M.D.;  Location: [facility];  Service: Gen Robotic    OVARIAN CYSTECTOMY Left 2023    Procedure: LEFT OVARIAN CYSTECTOMY;  Surgeon: Everardo Lara M.D.;  Location: [facility];  Service: Gen Robotic    PB KNEE SCOPE,MED/LAT MENISECTOMY Right 2022    Procedure: RIGHT KNEE ARTHROSCOPY, RIGHT LATERAL MENISCECTOMY AND HARDWARE REMOVAL REPAIRS AS INDICATED;  Surgeon: Rafat Alva M.D.;  Location: [facility];  Service: Orthopedics    PB KNEE SCOPE,MED/LAT MENISECTOMY Right 3/14/2022    Procedure: RIGHT KNEE ARTHROSCOPY, CHONDROPLASTY;  Surgeon: Rafat Alva M.D.;  Location: [facility];  Service: Orthopedics    MENISCAL REPAIR Right 3/14/2022    Procedure: REPAIR, MENISCUS, KNEE-lateral;  Surgeon: Rafat Alva M.D.;  Location: [facility];  Service: Orthopedics    REPEAT C SECTOIN WITH SALPINGECTOMY Bilateral 2021    Procedure:  SECTION, REPEAT, WITH SALPINGECTOMY;  Surgeon: Jorge Alvarenga M.D.;  [facility];  Service: Labor and Delivery    REPEAT C SECTION N/A 9/15/2018    Procedure: REPEAT C SECTION;  Surgeon: Rachna Patterson M.D.;  Location: [facility];  Service: Labor and Delivery    PRIMARY C  SECTION Bilateral 6/11/2016    Procedure: PRIMARY C SECTION;  Surgeon: Trever Beckman M.D.;  Location: [facility];  Service:     MANNY BY LAPAROSCOPY  3/12/2015    Procedure: MANNY BY LAPAROSCOPY;  Surgeon: Sean Mitchell M.D.;  Location: SURGERY [facility];  Service:     PELVISCOPY  2013    CYSTOSCOPY  8/30/2012    Performed by QUINTIN LOPES at SURGERY [facility]    PELVISCOPY  4/19/2012    Performed by CHRISTIANO PUGH at SURGERY SAME DAY [facility]    MYRINGOTOMY      STRABISMUS REPAIR      gerardo eyes    TONSILLECTOMY          FAMILY PSYCHIATRIC HISTORY  Family History   Problem Relation Age of Onset    Other Mother         Migraine    Other Brother         Migraine    Diabetes Other     Hypertension Other     Stroke Other     Cancer Other     Heart Disease Other        SOCIAL HISTORY  Reviewed with patient and no changes.     SUBSTANCE USE HISTORY  Denies    PSYCHIATRIC EXAMINATION   Mental Status Exam    Appearance: Appropriate dress and grooming  Sensorium: Alert and Oriented X 4  Behavior: Appropriate  Motor Activity: Unremarkable  Eye Contact: Adequate  Speech: Normal (Comment: increased rate)  Mood: Euthymic  Affect: Congruent with normal range  Thought Flow: Linear  Thought Content: Unremarkable  Suicidality: Denies  Hallucinations: Denies  Cognition: Normal  Insight: Intact  Reliability: Apparently reliable  Judgement: Good              CURRENT RISK ASSESSMENT       Suicide: Low       Homicide: Low       Self-Harm: Low       Relapse: Not applicable       Crisis Safety Plan Reviewed Not Indicated    [place]  records   reviewed.  No concerns about misuse of controlled substance.    ASSESSMENT  Brenda Westbrook is a 28 y.o. old female who presents today for follow up appointment to address Medication Management  She has been doing well since starting Lamictal.  Mood is more stable.  No adverse effects.  Still having trouble falling asleep and having tiredness during  the day as a result.  Will start trazodone, which has been helpful for her brother.    DIAGNOSES/PLAN  1. Bipolar 2 disorder (HCC)  - traZODone (DESYREL) 50 MG Tab; Take 2 Tablets by mouth every evening for 90 days. Take 1/2 to 1 tablet by mouth at bedtime for 2 weeks then increase to 100 mg po at bedtime for insomnia.  Dispense: 60 Tablet; Refill: 2  - lamoTRIgine (LAMICTAL) 100 MG Tab; Take 1 Tablet by mouth every day for 90 days.  Dispense: 30 Tablet; Refill: 2  - DULoxetine (CYMBALTA) 30 MG Cap DR Particles; Take 1 Capsule by mouth every evening for 90 days. Indications: Generalized Anxiety Disorder  Dispense: 30 Capsule; Refill: 2    2. Insomnia, unspecified type  - traZODone (DESYREL) 50 MG Tab; Take 2 Tablets by mouth every evening for 90 days. Take 1/2 to 1 tablet by mouth at bedtime for 2 weeks then increase to 100 mg po at bedtime for insomnia.  Dispense: 60 Tablet; Refill: 2    3. Depression, unspecified depression type  - traZODone (DESYREL) 50 MG Tab; Take 2 Tablets by mouth every evening for 90 days. Take 1/2 to 1 tablet by mouth at bedtime for 2 weeks then increase to 100 mg po at bedtime for insomnia.  Dispense: 60 Tablet; Refill: 2    4. Bipolar 2 disorder (HCC)  - traZODone (DESYREL) 50 MG Tab; Take 2 Tablets by mouth every evening for 90 days. Take 1/2 to 1 tablet by mouth at bedtime for 2 weeks then increase to 100 mg po at bedtime for insomnia.  Dispense: 60 Tablet; Refill: 2  - lamoTRIgine (LAMICTAL) 100 MG Tab; Take 1 Tablet by mouth every day for 90 days.  Dispense: 30 Tablet; Refill: 2  - DULoxetine (CYMBALTA) 30 MG Cap DR Particles; Take 1 Capsule by mouth every evening for 90 days. Indications: Generalized Anxiety Disorder  Dispense: 30 Capsule; Refill: 2         Medication options, alternatives (including no medications) and medication risks/benefits/side effects were discussed in detail.  The patient was advised to call[facility], or come in to the clinic if symptoms  worsen or if questions/issues regarding their medications arise.  The patient verbalized understanding and agreement.    The patient was educated to call 911, call the suicide hotline, or go to the local ER if having thoughts of suicide or homicide.  The patient verbalized understanding and agreement.   The proposed treatment plan was discussed with the patient who was provided the opportunity to ask questions and make suggestions regarding alternative treatment. Patient verbalized understanding and expressed agreement with the plan.      Return to clinic in 6-8 weeks or sooner if symptoms worsen.    This appointment was supervised by attending psychiatrist, who agrees with assessment and treatment plan.  See attending attestation for more details.     Aung Aly M.D.

## 2024-01-12 DIAGNOSIS — G43.009 MIGRAINE WITHOUT AURA AND WITHOUT STATUS MIGRAINOSUS, NOT INTRACTABLE: ICD-10-CM

## 2024-01-12 RX ORDER — AMITRIPTYLINE HYDROCHLORIDE 50 MG/1
50 TABLET, FILM COATED ORAL NIGHTLY
Qty: 30 TABLET | Refills: 5 | Status: SHIPPED | OUTPATIENT
Start: 2024-01-12

## 2024-01-16 ASSESSMENT — ANXIETY QUESTIONNAIRES
IF YOU CHECKED OFF ANY PROBLEMS ON THIS QUESTIONNAIRE, HOW DIFFICULT HAVE THESE PROBLEMS MADE IT FOR YOU TO DO YOUR WORK, TAKE CARE OF THINGS AT HOME, OR GET ALONG WITH OTHER PEOPLE: NOT DIFFICULT AT ALL
3. WORRYING TOO MUCH ABOUT DIFFERENT THINGS: NOT AT ALL
2. NOT BEING ABLE TO STOP OR CONTROL WORRYING: NOT AT ALL
1. FEELING NERVOUS, ANXIOUS, OR ON EDGE: SEVERAL DAYS
7. FEELING AFRAID AS IF SOMETHING AWFUL MIGHT HAPPEN: NOT AT ALL
6. BECOMING EASILY ANNOYED OR IRRITABLE: SEVERAL DAYS
4. TROUBLE RELAXING: NOT AT ALL
GAD7 TOTAL SCORE: 2
5. BEING SO RESTLESS THAT IT IS HARD TO SIT STILL: NOT AT ALL

## 2024-02-02 ENCOUNTER — OFFICE VISIT (OUTPATIENT)
Dept: BEHAVIORAL HEALTH | Facility: CLINIC | Age: 29
End: 2024-02-02
Payer: COMMERCIAL

## 2024-02-02 DIAGNOSIS — F51.05 INSOMNIA DUE TO OTHER MENTAL DISORDER: ICD-10-CM

## 2024-02-02 DIAGNOSIS — F31.81 BIPOLAR 2 DISORDER (HCC): ICD-10-CM

## 2024-02-02 DIAGNOSIS — F99 INSOMNIA DUE TO OTHER MENTAL DISORDER: ICD-10-CM

## 2024-02-02 PROCEDURE — 99214 OFFICE O/P EST MOD 30 MIN: CPT | Performed by: PSYCHIATRY & NEUROLOGY

## 2024-02-02 RX ORDER — DULOXETIN HYDROCHLORIDE 20 MG/1
20 CAPSULE, DELAYED RELEASE ORAL DAILY
Qty: 90 CAPSULE | Refills: 0 | Status: SHIPPED | OUTPATIENT
Start: 2024-02-02 | End: 2024-03-12 | Stop reason: SDUPTHER

## 2024-02-06 NOTE — PROGRESS NOTES
Evaluation completed by: Aung Aly M.D.   Date of Service: 2/2/2024   Appointment type: in-office appointment.    Information below was collected from: patient      CHIEF COMPLIANT:  Medication Management    HPI:   Brenda Westbrook is a 29 y.o. old female who presents today for follow up appointment to address Medication Management    At our last appointment, she began taking Lamictal for depression with mood stabilization in addition to Cymbalta due to hypomanic symptoms.  She denies adverse effects to Lamictal and her mood has felt more stable.  She has been taking trazodone every night, which has been effective at maintaining adequate sleep and she feels well-rested.  She has been out of town for school and comes back to visit her  and kids for weekends/breaks etc.  She also has a history of migraine headaches and was recently taken off of imipramine and started on amitriptyline for migraine control.  We discussed potential for drug interactions and serotonin syndrome from taking duloxetine, trazodone, and amitriptyline.  She denies symptoms of serotonin syndrome, including autonomic instability, tremor, or hyperreflexia.    CURRENT MEDICATIONS    Current Outpatient Medications:     DULoxetine (CYMBALTA) 20 MG Cap DR Particles, Take 1 Capsule by mouth every day for 90 days., Disp: 90 Capsule, Rfl: 0    amitriptyline (ELAVIL) 50 MG Tab, Take 1 Tablet by mouth every evening., Disp: 30 Tablet, Rfl: 5    Rimegepant Sulfate (NURTEC) 75 MG TABLET DISPERSIBLE, Take 1 Tablet by mouth every 48 hours., Disp: 15 Tablet, Rfl: 5    traZODone (DESYREL) 50 MG Tab, Take 2 Tablets by mouth every evening for 90 days. Take 1/2 to 1 tablet by mouth at bedtime for 2 weeks then increase to 100 mg po at bedtime for insomnia., Disp: 60 Tablet, Rfl: 2    lamoTRIgine (LAMICTAL) 100 MG Tab, Take 1 Tablet by mouth every day for 90 days., Disp: 30 Tablet, Rfl: 2    methylPREDNISolone (MEDROL DOSEPAK) 4 MG Tablet Therapy  Pack, Follow schedule on package instructions., Disp: 21 Tablet, Rfl: 0    betamethasone, augmented, (DIPROLENE) 0.05 % gel, as needed., Disp: , Rfl:     albuterol (PROVENTIL) 2.5 mg/0.5 mL Nebu Soln, Take  by nebulization one time., Disp: , Rfl:     budesonide-formoterol (SYMBICORT) 80-4.5 MCG/ACT Aerosol, Inhale 2 Puffs 2 times a day., Disp: , Rfl:         MEDICAL HISTORY  Past Medical History:   Diagnosis Date    Asthma, exercise induced     Bronchitis     Chlamydia     Oct 2011    Cold     02/6/15    Endometriosis     Head ache     Heart burn     Indigestion     Intractable migraine without aura and without status migrainosus 04/19/2017    Referral to neuro    Other specified symptom associated with female genital organs     endometriosis    Pain     pelvic    Pain 08/23/2012    stomach    Psychiatric problem     depression    Sleep apnea     tested twice - one pos, one neg    Sleep disturbances     Unspecified disorder of thyroid 2015    hyperactive in the past not taking any meds currently    Urinary bladder disorder     frequent uti's     Allergies:   Allergies   Allergen Reactions    Ciprofloxacin Vomiting    Codeine Hives     Tolerates oxycodone and hydromorphone    Doxycycline Itching and Vomiting    Keflex Itching    Morphine Unspecified and Hives     Patient reports could not breath.      @pre    SURGICAL HISTORY  Past Surgical History:   Procedure Laterality Date    GA CYSTOURETHROSCOPY N/A 4/12/2023    Procedure: CYSTOSCOPY;  Surgeon: Everardo Lara M.D.;  Location: [facility];  Service: Gen Robotic    GA TRANSPOSITION, OVARY(S) Bilateral 4/12/2023    Procedure: OOPHOROPEXY;  Surgeon: Everardo Lara M.D.;  Location: SURGERY [facility];  Service: Gen Robotic    GA LAP,FULGURATE/EXCISE LESIONS N/A 4/12/2023    Procedure: FULGURATION, ENDOMETRIOSIS, LAPAROSCOPIC;  Surgeon: Everardo Lara M.D.;  Location: SURGERY [facility];  Service: Gen Robotic    HYSTERECTOMY ROBOTIC XI N/A 4/12/2023     Procedure: ROBOTICALLY ASSISTED TOTAL LAPAROSCOPIC HYSTERECTOMY;  Surgeon: Everardo Lara M.D.;  Location: SURGERY [facility];  Service: Gen Robotic    OVARIAN CYSTECTOMY Left 2023    Procedure: LEFT OVARIAN CYSTECTOMY;  Surgeon: Everardo Lara M.D.;  Location: SURGERY [facility];  Service: Gen Robotic    PB KNEE SCOPE,MED/LAT MENISECTOMY Right 2022    Procedure: RIGHT KNEE ARTHROSCOPY, RIGHT LATERAL MENISCECTOMY AND HARDWARE REMOVAL REPAIRS AS INDICATED;  Surgeon: Rafat Alva M.D.;  Location: [facility];  Service: Orthopedics    PB KNEE SCOPE,MED/LAT MENISECTOMY Right 3/14/2022    Procedure: RIGHT KNEE ARTHROSCOPY, CHONDROPLASTY;  Surgeon: Rafat Alva M.D.;  Location: [facility];  Service: Orthopedics    MENISCAL REPAIR Right 3/14/2022    Procedure: REPAIR, MENISCUS, KNEE-lateral;  Surgeon: Rafat Alva M.D.;  Location: [facility];  Service: Orthopedics    REPEAT C SECTOIN WITH SALPINGECTOMY Bilateral 2021    Procedure:  SECTION, REPEAT, WITH SALPINGECTOMY;  Surgeon: Jorge Alvarenga M.D.;  Location: [facility];  Service: Labor and Delivery    REPEAT C SECTION N/A 9/15/2018    Procedure: REPEAT C SECTION;  Surgeon: Rachna Patterson M.D.;  Location: [facility];  Service: Labor and Delivery    PRIMARY C SECTION Bilateral 2016    Procedure: PRIMARY C SECTION;  Surgeon: Trever Beckman M.D.;  Location: [facility] DEL[facility];  Service:     MANNY BY LAPAROSCOPY  3/12/2015    Procedure: MANNY BY LAPAROSCOPY;  Surgeon: Sean Mitchell M.D.;  Location: SURGERY [facility];  Service:     PELVISCOPY  2013    CYSTOSCOPY  2012    Performed by QUINTIN LOPES at SURGERY [facility]    PELVISCOPY  2012    Performed by CHRISTIANO PUGH at SURGERY SAME DAY [facility]    MYRINGOTOMY      STRABISMUS REPAIR      gerardo eyes    TONSILLECTOMY          [facility] PSYCHIATRIC  HISTORY  Family History   Problem Relation Age of Onset    Other Mother         Migraine    Other Brother         Migraine    Diabetes Other     Hypertension Other     Stroke Other     Cancer Other     Heart Disease Other        SOCIAL HISTORY  Reviewed with patient and no changes.     SUBSTANCE USE HISTORY  Denies use/changes.    PSYCHIATRIC EXAMINATION   Mental Status Exam    Appearance: Appropriate dress and grooming  Sensorium: Alert and Oriented X 4  Behavior: Appropriate  Motor Activity: Unremarkable  Eye Contact: Adequate  Speech: Normal (Comment: increased rate)  Mood: Euthymic  Affect: Congruent with normal range  Thought Flow: Linear  Thought Content: Unremarkable  Suicidality: Denies  Hallucinations: Denies  Cognition: Normal  Insight: Intact  Reliability: Apparently reliable  Judgement: Good         [place]  records   reviewed.  No concerns about misuse of controlled substance.    ASSESSMENT  Brenda Westbrook is a 29 y.o. old female who presents today for follow up appointment to address Medication Management  She has been doing well since starting Lamictal for mood.  She feels more stable.  She denies adverse effects.  She has been taking trazodone nightly and is sleeping well.  She has been started on amitriptyline for migraine prophylaxis (was previously taking imipramine).  We discussed potential for interactions and serotonin syndrome.  She denies symptoms of serotonin syndrome.  We will decrease dose of Cymbalta from 30 mg to 20 mg to reduce risk for interactions, since she is now taking Lamictal for depression.  She will return to clinic next time she comes home from school.    DIAGNOSES/PLAN  1. Bipolar 2 disorder (HCC) with insomnia  -Decrease dose of Cymbalta from 30 mg to 20 mg po daily   -Continue Lamictal 100 mg po daily  -Continue trazodone 100 mg po nightly         Medication options, alternatives (including no medications) and medication risks/benefits/side effects were discussed in  detail.  The patient was advised to call, message clinician on [email or web address], or come in to the clinic if symptoms worsen or if questions/issues regarding their medications arise.  The patient verbalized understanding and agreement.    The patient was educated to call 911, call the suicide hotline, or go to the local ER if having thoughts of suicide or homicide.  The patient verbalized understanding and agreement.   The proposed treatment plan was discussed with the patient who was provided the opportunity to ask questions and make suggestions regarding alternative treatment. Patient verbalized understanding and expressed agreement with the plan.      Return to clinic in 4-6 weeks or sooner if symptoms worsen.    This appointment was supervised by attending psychiatrist, who agrees with assessment and treatment plan.  See attending attestation for more details.     Aung Aly M.D.

## 2024-03-12 ENCOUNTER — OFFICE VISIT (OUTPATIENT)
Dept: BEHAVIORAL HEALTH | Facility: CLINIC | Age: 29
End: 2024-03-12
Payer: COMMERCIAL

## 2024-03-12 DIAGNOSIS — F31.81 BIPOLAR 2 DISORDER (HCC): ICD-10-CM

## 2024-03-12 PROCEDURE — 99214 OFFICE O/P EST MOD 30 MIN: CPT | Mod: GC | Performed by: PSYCHIATRY & NEUROLOGY

## 2024-03-12 RX ORDER — DULOXETIN HYDROCHLORIDE 20 MG/1
20 CAPSULE, DELAYED RELEASE ORAL DAILY
Qty: 90 CAPSULE | Refills: 0 | Status: SHIPPED | OUTPATIENT
Start: 2024-03-12 | End: 2024-06-10

## 2024-03-12 RX ORDER — LAMOTRIGINE 100 MG/1
100 TABLET ORAL
Qty: 90 TABLET | Refills: 0 | Status: SHIPPED | OUTPATIENT
Start: 2024-03-12 | End: 2024-06-10

## 2024-03-12 RX ORDER — LITHIUM CARBONATE 600 MG/1
600 CAPSULE ORAL
Qty: 90 CAPSULE | Refills: 0 | Status: SHIPPED | OUTPATIENT
Start: 2024-03-12 | End: 2024-06-10

## 2024-03-13 NOTE — PROGRESS NOTES
Evaluation completed by: Aung Aly M.D.   Date of Service: 03/12/2024  Appointment type: in-office appointment.    Information below was collected from: patient      CHIEF COMPLIANT:  Medication Management      HPI:   Brenda Westbrook is a 29 y.o. old female who presents today for follow up appointment to address Medication Management    Patient reports that she has been taking reduced dose of duloxetine 20 mg po in the morning.  She continues to take lamotrigine 100 mg po at bedtime.  She is no longer taking trazodone due to exacerbation of her migraine headaches.  She has occasionally taken 1/4 tablet of trazodone if she really having trouble sleeping.  She was recently started on amitriptyline by her neurologist, which is being used as migraine prophylaxis.  She takes this at bedtime with her lamotrigine, and she has been able to sleep well after taking these two medications together.    However, she reports that she recently had an episode with elevated mood and other symptoms of betzy.  This lasted for about ten days.  She noticed increased goal-directed behaviors, including cleaning her entire apartment on a random weeknight, increased motivation and activity overall, and impulsive/risky changes that impacted her spending, driving, and interpersonal reactions.  Her friends in [place] (where she is going to school with trips home to see family) noticed that she had a change in her behavior with rapid speech and overall elevated energy.  She felt like she didn't need to take her medications anymore because she felt so great, which is typical for her manic episodes, but she did not stop taking any of them (and has not stopped taking meds in the past despite wanting to).      After the ten-day period of elevated mood, she felt a crash and experienced decreased energy, motivation, and enjoyment of activities.  She has had to push through these symptoms to complete her schoolwork, which is especially  difficult this semester due to heavy workload.  She denies suicidal ideation.    CURRENT MEDICATIONS    Current Outpatient Medications:     lithium 600 MG capsule, Take 1 Capsule by mouth before evening meal for 90 days., Disp: 90 Capsule, Rfl: 0    lamoTRIgine (LAMICTAL) 100 MG Tab, Take 1 Tablet by mouth at bedtime for 90 days., Disp: 90 Tablet, Rfl: 0    DULoxetine (CYMBALTA) 20 MG Cap DR Particles, Take 1 Capsule by mouth every day for 90 days., Disp: 90 Capsule, Rfl: 0    amitriptyline (ELAVIL) 50 MG Tab, Take 1 Tablet by mouth every evening., Disp: 30 Tablet, Rfl: 5    traZODone (DESYREL) 50 MG Tab, Take 2 Tablets by mouth every evening for 90 days. Take 1/2 to 1 tablet by mouth at bedtime for 2 weeks then increase to 100 mg po at bedtime for insomnia., Disp: 60 Tablet, Rfl: 2    Rimegepant Sulfate (NURTEC) 75 MG TABLET DISPERSIBLE, Take 1 Tablet by mouth every 48 hours., Disp: 15 Tablet, Rfl: 5    methylPREDNISolone (MEDROL DOSEPAK) 4 MG Tablet Therapy Pack, Follow schedule on package instructions., Disp: 21 Tablet, Rfl: 0    betamethasone, augmented, (DIPROLENE) 0.05 % gel, as needed., Disp: , Rfl:     albuterol (PROVENTIL) 2.5 mg/0.5 mL Nebu Soln, Take  by nebulization one time., Disp: , Rfl:     budesonide-formoterol (SYMBICORT) 80-4.5 MCG/ACT Aerosol, Inhale 2 Puffs 2 times a day., Disp: , Rfl:     Psychiatric Review of Systems:current symptoms as reported by pt.  Depression: Anhedonia, Low energy, Difficulty concentrating, and Psychomotor retardation  Awilda: denies current symptoms  Anxiety/Panic Attacks: difficulty concentrating  Psychosis: Patient reports no signs or symptoms indicative of psychosis  ADHD: fails to give close attention to details or makes careless mistakes in school, has difficulty sustaining attention in tasks or play activities, has difficulty organizing tasks and activities, does not follow through on instructions and fails to finish schoolwork, is often forgetful in daily  activities, avoids engaging in tasks that require sustained attention,   Sleep: Improved since taking amitriptyline, which was started by neurologist for migraine prophylaxis - not taking trazodone due to exacerbation of headaches  Behavioral/Aggression: Hypoactive  Substance Use: denies    MEDICAL REVIEW OF SYSTEMS   Neurological: chronic headaches, recently feeling cognitively slow and unable to access thought process      MEDICAL HISTORY  Past Medical History:   Diagnosis Date    Asthma, exercise induced     Bronchitis     Chlamydia     Oct 2011    Cold     02/6/15    Endometriosis     Head ache     Heart burn     Indigestion     Intractable migraine without aura and without status migrainosus 04/19/2017    Referral to neuro    Other specified symptom associated with female genital organs     endometriosis    Pain     pelvic    Pain 08/23/2012    stomach    Psychiatric problem     depression    Sleep apnea     tested twice - one pos, one neg    Sleep disturbances     Unspecified disorder of thyroid 2015    hyperactive in the past not taking any meds currently    Urinary bladder disorder     frequent uti's     Allergies:   Allergies   Allergen Reactions    Ciprofloxacin Vomiting    Codeine Hives     Tolerates oxycodone and hydromorphone    Doxycycline Itching and Vomiting    Keflex Itching    Morphine Unspecified and Hives     Patient reports could not breath.        SURGICAL HISTORY  Past Surgical History:   Procedure Laterality Date    CT CYSTOURETHROSCOPY N/A 4/12/2023    Procedure: CYSTOSCOPY;  Surgeon: Everardo Lara M.D.;  Location: [facility];  Service: Gen Robotic    CT TRANSPOSITION, OVARY(S) Bilateral 4/12/2023    Procedure: OOPHOROPEXY;  Surgeon: Everardo Lara M.D.;  Location: [facility];  Service: Gen Robotic    CT LAP,FULGURATE/EXCISE LESIONS N/A 4/12/2023    Procedure: FULGURATION, ENDOMETRIOSIS, LAPAROSCOPIC;  Surgeon: Everardo Lara M.D.;  Location: [facility];   Service: Gen Robotic    HYSTERECTOMY ROBOTIC XI N/A 2023    Procedure: ROBOTICALLY ASSISTED TOTAL LAPAROSCOPIC HYSTERECTOMY;  Surgeon: Everardo Lara M.D.;  Location: SUR[facility]ERY [facility];  Service: Gen Robotic    OVARIAN CYSTECTOMY Left 2023    Procedure: LEFT OVARIAN CYSTECTOMY;  Surgeon: Everardo Lara M.D.;  Location: SURGERY [facility];  Service: Gen Robotic    PB KNEE SCOPE,MED/LAT MENISECTOMY Right 2022    Procedure: RIGHT KNEE ARTHROSCOPY, RIGHT LATERAL MENISCECTOMY AND HARDWARE REMOVAL REPAIRS AS INDICATED;  Surgeon: Rafat Alva M.D.;  Location: [facility];  Service: Orthopedics    PB KNEE SCOPE,MED/LAT MENISECTOMY Right 3/14/2022    Procedure: RIGHT KNEE ARTHROSCOPY, CHONDROPLASTY;  Surgeon: Rafat Alva M.D.;  Location: [facility];  Service: Orthopedics    MENISCAL REPAIR Right 3/14/2022    Procedure: REPAIR, MENISCUS, KNEE-lateral;  Surgeon: Rafat Alva M.D.;  Location: [facility];  Service: Orthopedics    REPEAT C SECTOIN WITH SALPINGECTOMY Bilateral 2021    Procedure:  SECTION, REPEAT, WITH SALPINGECTOMY;  Surgeon: Jorge Alvarenga M.D.;  [facility]: [facility];  Service: Labor and Delivery    REPEAT C SECTION N/A 9/15/2018    Procedure: REPEAT C SECTION;  Surgeon: Rachna Patterson M.D.;  Location: [facility];  Service: Labor and Delivery    PRIMARY C SECTION Bilateral 2016    Procedure: PRIMARY C SECTION;  Surgeon: Trever Beckman M.D.;  Location: [facility];  Service:     MANNY BY LAPAROSCOPY  3/12/2015    Procedure: MANNY BY LAPAROSCOPY;  Surgeon: Sean Mitchell M.D.;  Location: SURGERY [facility];  Service:     PELVISCOPY  2013    CYSTOSCOPY  2012    Performed by QUINTIN LOPES at SURGERY [facility]    PELVISCOPY  2012    Performed by CHRISTIANO PUGH at SURGERY SAME DAY [facility]    MYRINGOTOMY      STRABISMUS REPAIR       gerardo eyes    TONSILLECTOMY          FAMILY PSYCHIATRIC HISTORY  Family History   Problem Relation Age of Onset    Other Mother         Migraine    Other Brother         Migraine    Diabetes Other     Hypertension Other     Stroke Other     Cancer Other     Heart Disease Other        SOCIAL HISTORY  Reviewed with patient and no changes.     SUBSTANCE USE HISTORY  Denies changes or use    PSYCHIATRIC EXAMINATION   Mental Status Exam    Appearance: Appropriate dress and grooming  Sensorium: Alert and Oriented X 4  Behavior: Appropriate  Motor Activity: Unremarkable  Eye Contact: Adequate  Speech: Normal (Comment: increased rate)  Mood: Euthymic, Neutral  Affect: Congruent with normal range  Thought Flow: Linear  Thought Content: Unremarkable  Suicidality: Denies  Hallucinations: Denies  Cognition: Normal  Insight: Intact  Reliability: Apparently reliable  Judgement: Good           [place]  records   not available - last checked one month ago and no concerns.    ASSESSMENT  Brenda Westbrook is a 29 y.o. old female who presents today for follow up appointment to address Medication Management  She reports that she has had destabilization of mood, despite decreasing dose of duloxetine.  She was recently started on amitriptyline for migraine prophylaxis.  She had a period of ten days with manic symptoms, including excessive goal-directed behavior, impulsive overspending, fast driving, and noticeably elevated interactions with peers in [place].  She crashed and had trouble with motivation and energy.  She continues to have trouble with focus and feels absent-minded or dulled, which have been problems in the past.  She is sleeping well since starting amitriptyline, and she has stopped taking trazodone due to exacerbation of migraines.  We discussed options for mood stabilization to prevent additional manic/hypomanic episodes.  She has taken Depakote in the past as a migraine medication, but she recalls that this lost  effectiveness over time and had to be changed to something else.  She is concerned about this happening again.  She has not taken lithium in the past.  Today, we will start lithium 600 mg po daily in the evening/around dinnertime.  We discussed importance of close monitoring and reviewed symptoms of toxicity.  She has an IUD as birth control.  Since she will be returning to [place], we will provide a printed request for lithium level labwork for her to complete after one week.  We will see her when she returns to [place] in around 6 weeks.  She can continue other medications for now.    DIAGNOSES/PLAN  1. Bipolar 2 disorder (HCC)    - Start lithium 600 MG capsule po qpm around dinnertime  - Lithium lab in one week - printed order provided  - Continue lamotrigine 100 MG po at bedtime  - Continue duloxetine 20 MG Cap DR by mouth every morning    -Also taking amitriptyline for migraines, which is prescribed by her neurologist         Medication options, alternatives (including no medications) and medication risks/benefits/side effects were discussed in detail.  The patient was advised to call, message clinician on Tyto, or come in to the clinic if symptoms worsen or if questions/issues regarding their medications arise.  The patient verbalized understanding and agreement.    The patient was educated to call 911, call the suicide hotline, or go to the local ER if having thoughts of suicide or homicide.  The patient verbalized understanding and agreement.   The proposed treatment plan was discussed with the patient who was provided the opportunity to ask questions and make suggestions regarding alternative treatment. Patient verbalized understanding and expressed agreement with the plan.      Return to clinic in 6 weeks or sooner if symptoms worsen.    This appointment was supervised by attending psychiatrist, who agrees with assessment and treatment plan.  See attending attestation for more details.     Aung Aly,  M.D.

## 2024-04-24 ENCOUNTER — HOSPITAL ENCOUNTER (EMERGENCY)
Facility: MEDICAL CENTER | Age: 29
End: 2024-04-25
Attending: STUDENT IN AN ORGANIZED HEALTH CARE EDUCATION/TRAINING PROGRAM
Payer: COMMERCIAL

## 2024-04-24 ENCOUNTER — APPOINTMENT (OUTPATIENT)
Dept: RADIOLOGY | Facility: MEDICAL CENTER | Age: 29
End: 2024-04-24
Attending: STUDENT IN AN ORGANIZED HEALTH CARE EDUCATION/TRAINING PROGRAM
Payer: COMMERCIAL

## 2024-04-24 DIAGNOSIS — R10.9 LEFT FLANK PAIN: ICD-10-CM

## 2024-04-24 LAB
ALBUMIN SERPL BCP-MCNC: 4.8 G/DL (ref 3.2–4.9)
ALBUMIN/GLOB SERPL: 2.2 G/DL
ALP SERPL-CCNC: 52 U/L (ref 30–99)
ALT SERPL-CCNC: 19 U/L (ref 2–50)
ANION GAP SERPL CALC-SCNC: 13 MMOL/L (ref 7–16)
APPEARANCE UR: CLEAR
AST SERPL-CCNC: 16 U/L (ref 12–45)
BASOPHILS # BLD AUTO: 0.7 % (ref 0–1.8)
BASOPHILS # BLD: 0.06 K/UL (ref 0–0.12)
BILIRUB SERPL-MCNC: <0.2 MG/DL (ref 0.1–1.5)
BILIRUB UR QL STRIP.AUTO: NEGATIVE
BUN SERPL-MCNC: 10 MG/DL (ref 8–22)
CALCIUM ALBUM COR SERPL-MCNC: 8.6 MG/DL (ref 8.5–10.5)
CALCIUM SERPL-MCNC: 9.2 MG/DL (ref 8.5–10.5)
CHLORIDE SERPL-SCNC: 106 MMOL/L (ref 96–112)
CO2 SERPL-SCNC: 22 MMOL/L (ref 20–33)
COLOR UR: YELLOW
CREAT SERPL-MCNC: 0.94 MG/DL (ref 0.5–1.4)
EOSINOPHIL # BLD AUTO: 0.19 K/UL (ref 0–0.51)
EOSINOPHIL NFR BLD: 2.2 % (ref 0–6.9)
ERYTHROCYTE [DISTWIDTH] IN BLOOD BY AUTOMATED COUNT: 47.3 FL (ref 35.9–50)
GFR SERPLBLD CREATININE-BSD FMLA CKD-EPI: 84 ML/MIN/1.73 M 2
GLOBULIN SER CALC-MCNC: 2.2 G/DL (ref 1.9–3.5)
GLUCOSE SERPL-MCNC: 100 MG/DL (ref 65–99)
GLUCOSE UR STRIP.AUTO-MCNC: NEGATIVE MG/DL
HCT VFR BLD AUTO: 35.6 % (ref 37–47)
HGB BLD-MCNC: 11.4 G/DL (ref 12–16)
IMM GRANULOCYTES # BLD AUTO: 0.02 K/UL (ref 0–0.11)
IMM GRANULOCYTES NFR BLD AUTO: 0.2 % (ref 0–0.9)
KETONES UR STRIP.AUTO-MCNC: NEGATIVE MG/DL
LEUKOCYTE ESTERASE UR QL STRIP.AUTO: NEGATIVE
LIPASE SERPL-CCNC: 43 U/L (ref 11–82)
LYMPHOCYTES # BLD AUTO: 3.13 K/UL (ref 1–4.8)
LYMPHOCYTES NFR BLD: 36.3 % (ref 22–41)
MCH RBC QN AUTO: 27.3 PG (ref 27–33)
MCHC RBC AUTO-ENTMCNC: 32 G/DL (ref 32.2–35.5)
MCV RBC AUTO: 85.4 FL (ref 81.4–97.8)
MICRO URNS: NORMAL
MONOCYTES # BLD AUTO: 0.64 K/UL (ref 0–0.85)
MONOCYTES NFR BLD AUTO: 7.4 % (ref 0–13.4)
NEUTROPHILS # BLD AUTO: 4.59 K/UL (ref 1.82–7.42)
NEUTROPHILS NFR BLD: 53.2 % (ref 44–72)
NITRITE UR QL STRIP.AUTO: NEGATIVE
NRBC # BLD AUTO: 0 K/UL
NRBC BLD-RTO: 0 /100 WBC (ref 0–0.2)
PH UR STRIP.AUTO: 6 [PH] (ref 5–8)
PLATELET # BLD AUTO: 345 K/UL (ref 164–446)
PMV BLD AUTO: 9.2 FL (ref 9–12.9)
POTASSIUM SERPL-SCNC: 3.7 MMOL/L (ref 3.6–5.5)
PROT SERPL-MCNC: 7 G/DL (ref 6–8.2)
PROT UR QL STRIP: NEGATIVE MG/DL
RBC # BLD AUTO: 4.17 M/UL (ref 4.2–5.4)
RBC UR QL AUTO: NEGATIVE
SODIUM SERPL-SCNC: 141 MMOL/L (ref 135–145)
SP GR UR STRIP.AUTO: 1.01
UROBILINOGEN UR STRIP.AUTO-MCNC: 0.2 MG/DL
WBC # BLD AUTO: 8.6 K/UL (ref 4.8–10.8)

## 2024-04-24 PROCEDURE — 80053 COMPREHEN METABOLIC PANEL: CPT

## 2024-04-24 PROCEDURE — 700102 HCHG RX REV CODE 250 W/ 637 OVERRIDE(OP): Performed by: STUDENT IN AN ORGANIZED HEALTH CARE EDUCATION/TRAINING PROGRAM

## 2024-04-24 PROCEDURE — 85025 COMPLETE CBC W/AUTO DIFF WBC: CPT

## 2024-04-24 PROCEDURE — 700101 HCHG RX REV CODE 250: Performed by: STUDENT IN AN ORGANIZED HEALTH CARE EDUCATION/TRAINING PROGRAM

## 2024-04-24 PROCEDURE — 81003 URINALYSIS AUTO W/O SCOPE: CPT

## 2024-04-24 PROCEDURE — 36415 COLL VENOUS BLD VENIPUNCTURE: CPT

## 2024-04-24 PROCEDURE — 99284 EMERGENCY DEPT VISIT MOD MDM: CPT

## 2024-04-24 PROCEDURE — 83690 ASSAY OF LIPASE: CPT

## 2024-04-24 PROCEDURE — A9270 NON-COVERED ITEM OR SERVICE: HCPCS | Performed by: STUDENT IN AN ORGANIZED HEALTH CARE EDUCATION/TRAINING PROGRAM

## 2024-04-24 RX ORDER — ACETAMINOPHEN 325 MG/1
650 TABLET ORAL ONCE
Status: COMPLETED | OUTPATIENT
Start: 2024-04-24 | End: 2024-04-24

## 2024-04-24 RX ORDER — LIDOCAINE 4 G/G
1 PATCH TOPICAL EVERY 24 HOURS
Status: DISCONTINUED | OUTPATIENT
Start: 2024-04-24 | End: 2024-04-25 | Stop reason: HOSPADM

## 2024-04-24 RX ORDER — OXYCODONE HYDROCHLORIDE AND ACETAMINOPHEN 5; 325 MG/1; MG/1
1 TABLET ORAL ONCE
Status: COMPLETED | OUTPATIENT
Start: 2024-04-24 | End: 2024-04-24

## 2024-04-24 RX ORDER — IBUPROFEN 600 MG/1
600 TABLET ORAL ONCE
Status: COMPLETED | OUTPATIENT
Start: 2024-04-24 | End: 2024-04-24

## 2024-04-24 RX ADMIN — IBUPROFEN 600 MG: 600 TABLET, FILM COATED ORAL at 22:41

## 2024-04-24 RX ADMIN — LIDOCAINE 1 PATCH: 4 PATCH TOPICAL at 22:41

## 2024-04-24 RX ADMIN — OXYCODONE AND ACETAMINOPHEN 1 TABLET: 5; 325 TABLET ORAL at 22:41

## 2024-04-24 RX ADMIN — ACETAMINOPHEN 650 MG: 325 TABLET, FILM COATED ORAL at 22:41

## 2024-04-24 ASSESSMENT — FIBROSIS 4 INDEX: FIB4 SCORE: 0.27

## 2024-04-24 ASSESSMENT — PAIN DESCRIPTION - PAIN TYPE: TYPE: ACUTE PAIN

## 2024-04-25 VITALS
RESPIRATION RATE: 14 BRPM | OXYGEN SATURATION: 98 % | SYSTOLIC BLOOD PRESSURE: 122 MMHG | TEMPERATURE: 98.4 F | BODY MASS INDEX: 28.55 KG/M2 | HEART RATE: 72 BPM | HEIGHT: 63 IN | DIASTOLIC BLOOD PRESSURE: 71 MMHG | WEIGHT: 161.16 LBS

## 2024-04-25 PROCEDURE — 74176 CT ABD & PELVIS W/O CONTRAST: CPT

## 2024-04-25 RX ORDER — CYCLOBENZAPRINE HCL 10 MG
10 TABLET ORAL 3 TIMES DAILY PRN
Qty: 15 TABLET | Refills: 0 | Status: SHIPPED | OUTPATIENT
Start: 2024-04-25

## 2024-04-25 NOTE — ED NOTES
Discharge education provided. Discharge paperwork reviewed with pt. Prescription to be picked up by pt. All questions answered. All belongings with pt. Pt ambulated to lobby unassisted with steady gait.

## 2024-04-25 NOTE — ED PROVIDER NOTES
ED Provider Note    CHIEF COMPLAINT  Chief Complaint   Patient presents with    Flank Pain     L flank pain x 1 month, pt was dx with a kidney infection on 4/3 and treated with IV abx in [place] before being discharged but pt states the kidney pain never went away     UTI     Pt endorses burning when she urinated, recently completed abx for a UTI at the end of March        EXTERNAL RECORDS REVIEWED  Outpatient Notes Follows with neurology for migraines    HPI/ROS  LIMITATION TO HISTORY   Select: : None  OUTSIDE HISTORIAN(S):  None    Brenda Westbrook is a 29 y.o. female who presents for evaluation of ongoing left flank pain. She notes she was diagnosed with a UTI in March. She had no flank pain then. Her flank pain started 04/03. 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 They diagnosed her with kidney infection and gave her IV antibiotics. She had a CT scan done with IV contrast that was normal. Despite the antibiotics, the pain has not gone away. She is unsure if anything makes it better or worse and she states it is just there. She has taken tylenol here and there for the pain. She traveled back to [place] today and came straight to the ER to see if there was anything wrong as she has finals on Tuesday. She denies any change in her symptoms since early April. She has no dysuria, hematuria, abdominal pain, nausea, vomiting, fever, chills. She denies vaginal discharge. She has had a hysterectomy. She has no history of kidney stones She is not on birth control pills, she has no history of PE or DVT, she denies recent immobilization. She has no cough or difficulty breathing or chest pain. She has no urinary or bowel incontinence or saddle anesthesia or difficulty walking.     PAST MEDICAL HISTORY   has a past medical history of Asthma, exercise induced, Bronchitis, Chlamydia, Cold, Endometriosis, Head ache, Heart burn, Indigestion, Intractable migraine without aura and without status  migrainosus (04/19/2017), Other specified symptom associated with female genital organs, Pain, Pain (08/23/2012), Psychiatric problem, Sleep apnea, Sleep disturbances, Unspecified disorder of thyroid (2015), and Urinary bladder disorder.    SURGICAL HISTORY   has a past surgical history that includes tonsillectomy; strabismus repair; myringotomy; cystoscopy (8/30/2012); pelviscopy (4/19/2012); pelviscopy (2013); joshua by laparoscopy (3/12/2015); primary c section (Bilateral, 6/11/2016); repeat c section (N/A, 9/15/2018); repeat c sectoin with salpingectomy (Bilateral, 5/9/2021); knee scope,med/lat menisectomy (Right, 3/14/2022); meniscal repair (Right, 3/14/2022); knee scope,med/lat menisectomy (Right, 11/16/2022); cystourethroscopy (N/A, 4/12/2023); transposition, ovary(s) (Bilateral, 4/12/2023); lap,fulgurate/excise lesions (N/A, 4/12/2023); hysterectomy robotic xi (N/A, 4/12/2023); and ovarian cystectomy (Left, 4/12/2023).    FAMILY HISTORY  Family History   Problem Relation Age of Onset    Other Mother         Migraine    Other Brother         Migraine    Diabetes Other     Hypertension Other     Stroke Other     Cancer Other     Heart Disease Other        SOCIAL HISTORY  Social History     Tobacco Use    Smoking status: Never     Passive exposure: Never    Smokeless tobacco: Never   Vaping Use    Vaping Use: Never used   Substance and Sexual Activity    Alcohol use: Not Currently     Comment: occasionally - 2 times per month    Drug use: No    Sexual activity: Yes     Partners: Male     Birth control/protection: Condom, Female Sterilization       CURRENT MEDICATIONS  Home Medications       Reviewed by Kristy Navarrete R.N. (Registered Nurse) on 04/24/24 at 2052  Med List Status: Partial     Medication Last Dose Status   albuterol (PROVENTIL) 2.5 mg/0.5 mL Nebu Soln  Active   amitriptyline (ELAVIL) 50 MG Tab  Active   betamethasone, augmented, (DIPROLENE) 0.05 % gel  Active   budesonide-formoterol (SYMBICORT)  "80-4.5 MCG/ACT Aerosol  Active   DULoxetine (CYMBALTA) 20 MG Cap DR Particles  Active   lamoTRIgine (LAMICTAL) 100 MG Tab  Active   lithium 600 MG capsule  Active   methylPREDNISolone (MEDROL DOSEPAK) 4 MG Tablet Therapy Pack  Active   Rimegepant Sulfate (NURTEC) 75 MG TABLET DISPERSIBLE  Active                    ALLERGIES  Allergies   Allergen Reactions    Ciprofloxacin Vomiting    Codeine Hives     Tolerates oxycodone and hydromorphone    Doxycycline Itching and Vomiting    Keflex Itching    Morphine Unspecified and Hives     Patient reports could not breath.        PHYSICAL EXAM  VITAL SIGNS: /68   Pulse 77   Temp 36.6 °C (97.8 °F) (Temporal)   Resp 16   Ht 1.6 m (5' 3\")   Wt 73.1 kg (161 lb 2.5 oz)   LMP 02/22/2023   SpO2 99%   BMI 28.55 kg/m²      Constitutional: Well developed, Well nourished, No acute respiratory distress, Non-toxic appearance.   HENT: Normocephalic, Atraumatic, Bilateral external ears normal, Oropharynx clear, mucous membranes are moist.  Eyes: Conjunctiva normal, No discharge. No icterus.  Neck: Normal range of motion. Supple.  Cardiovascular: Normal heart rate, Normal rhythm, No murmurs, No rubs, No gallops.   Thorax & Lungs: Clear to auscultation bilaterally, No respiratory distress, No wheezing.  Abdomen: Soft nontender normal bowel sounds no rebound masses or peritoneal signs  Back: Left CVA TTP, no right CVA TTP, no skin changes noted on back. No midline T or L spine tenderness, step off or deformity  Skin: Warm, Dry, No erythema, No rash.   Extremities: Intact distal pulses, No edema, No tenderness  Musculoskeletal: Good range of motion in all major joints. Normal gait.  Neurologic: Alert & oriented. No focal deficits noted.   Psych: Alert normal affect       EKG/LABS  Results for orders placed or performed during the hospital encounter of 04/24/24   CBC with Differential   Result Value Ref Range    WBC 8.6 4.8 - 10.8 K/uL    RBC 4.17 (L) 4.20 - 5.40 M/uL    Hemoglobin " 11.4 (L) 12.0 - 16.0 g/dL    Hematocrit 35.6 (L) 37.0 - 47.0 %    MCV 85.4 81.4 - 97.8 fL    MCH 27.3 27.0 - 33.0 pg    MCHC 32.0 (L) 32.2 - 35.5 g/dL    RDW 47.3 35.9 - 50.0 fL    Platelet Count 345 164 - 446 K/uL    MPV 9.2 9.0 - 12.9 fL    Neutrophils-Polys 53.20 44.00 - 72.00 %    Lymphocytes 36.30 22.00 - 41.00 %    Monocytes 7.40 0.00 - 13.40 %    Eosinophils 2.20 0.00 - 6.90 %    Basophils 0.70 0.00 - 1.80 %    Immature Granulocytes 0.20 0.00 - 0.90 %    Nucleated RBC 0.00 0.00 - 0.20 /100 WBC    Neutrophils (Absolute) 4.59 1.82 - 7.42 K/uL    Lymphs (Absolute) 3.13 1.00 - 4.80 K/uL    Monos (Absolute) 0.64 0.00 - 0.85 K/uL    Eos (Absolute) 0.19 0.00 - 0.51 K/uL    Baso (Absolute) 0.06 0.00 - 0.12 K/uL    Immature Granulocytes (abs) 0.02 0.00 - 0.11 K/uL    NRBC (Absolute) 0.00 K/uL   Complete Metabolic Panel   Result Value Ref Range    Sodium 141 135 - 145 mmol/L    Potassium 3.7 3.6 - 5.5 mmol/L    Chloride 106 96 - 112 mmol/L    Co2 22 20 - 33 mmol/L    Anion Gap 13.0 7.0 - 16.0    Glucose 100 (H) 65 - 99 mg/dL    Bun 10 8 - 22 mg/dL    Creatinine 0.94 0.50 - 1.40 mg/dL    Calcium 9.2 8.5 - 10.5 mg/dL    Correct Calcium 8.6 8.5 - 10.5 mg/dL    AST(SGOT) 16 12 - 45 U/L    ALT(SGPT) 19 2 - 50 U/L    Alkaline Phosphatase 52 30 - 99 U/L    Total Bilirubin <0.2 0.1 - 1.5 mg/dL    Albumin 4.8 3.2 - 4.9 g/dL    Total Protein 7.0 6.0 - 8.2 g/dL    Globulin 2.2 1.9 - 3.5 g/dL    A-G Ratio 2.2 g/dL   Lipase   Result Value Ref Range    Lipase 43 11 - 82 U/L   Urinalysis    Specimen: Urine   Result Value Ref Range    Color Yellow     Character Clear     Specific Gravity 1.012 <1.035    Ph 6.0 5.0 - 8.0    Glucose Negative Negative mg/dL    Ketones Negative Negative mg/dL    Protein Negative Negative mg/dL    Bilirubin Negative Negative    Urobilinogen, Urine 0.2 Negative    Nitrite Negative Negative    Leukocyte Esterase Negative Negative    Occult Blood Negative Negative    Micro Urine Req see below    ESTIMATED GFR    Result Value Ref Range    GFR (CKD-EPI) 84 >60 mL/min/1.73 m 2       I have independently interpreted this EKG    RADIOLOGY/PROCEDURES   I have independently interpreted the diagnostic imaging associated with this visit and am waiting the final reading from the radiologist.   My preliminary interpretation is as follows: no ureteral stone    Radiologist interpretation:  CT-RENAL COLIC EVALUATION(A/P W/O)   Final Result         1.  No acute intra-abdominal abnormality identified.          COURSE & MEDICAL DECISION MAKING    ASSESSMENT, COURSE AND PLAN  Care Narrative:   This is a 30 yo female here with left flank pain onset approximately one month. She was seen in [place], had CT scan with IV contrast that was normal. Diagnosed with pyelo but pain persists despite finishing antibiotics.     On arrival her vital signs are stable. She has left CVA TTP but there is no evidence of overlying skin changes to suggest shingles. I considered kidney stone, pyelo, LLL pneumonia. PE ruled out with PERC rule. No history of trauma to suggest splenic injury. I also considered MSK etiology. No midline back pain or red flag signs to raise concern for cauda equina.     Labs obtained and she has no UTI. Otherwise labs are reassuring with leukocytosis or electrolyte abnormality. CT renal colic obtained and shows no abnormalities. She was given pain control and reports feeling better.     Discussed with patient that no emergent pathology identified. I recommend she follow up with her PCP and take tylenol and motrin around the clock in the interim. I will rx her flexeril and she is advised that this is sedating so no drinking, driving, working on this. She is advised to return for worsening pain, difficulty breathing or any other concerns.             ADDITIONAL PROBLEMS MANAGED  None    DISPOSITION AND DISCUSSIONS  I have discussed management of the patient with the following physicians and MARC's:  none    Discussion of management with  other QHP or appropriate source(s): None     Escalation of care considered, and ultimately not performed:acute inpatient care management, however at this time, the patient is most appropriate for outpatient management    Barriers to care at this time, including but not limited to:  None .     Decision tools and prescription drugs considered including, but not limited to: PERC rule and PE effectively ruled out .     The patient will return for new or worsening symptoms and is stable at the time of discharge.    The patient is referred to a primary physician for blood pressure management, diabetic screening, and for all other preventative health concerns.      DISPOSITION:  Patient will be discharged home in stable condition.    FOLLOW UP:  Sonia Colin, F.N.P.  [address] [address]  [phone]          [facility], Emergency Dept  [address] [facility] [id number] [phone]          OUTPATIENT MEDICATIONS:  Discharge Medication List as of 4/25/2024 12:50 AM        START taking these medications    Details   cyclobenzaprine (FLEXERIL) 10 mg Tab Take 1 Tablet by mouth 3 times a day as needed for Moderate Pain., Disp-15 Tablet, R-0, Normal               FINAL DIAGNOSIS  1. Left flank pain           Electronically signed by: Gauri Griffin M.D., 4/24/2024 9:52 PM

## 2024-04-25 NOTE — ED TRIAGE NOTES
Chief Complaint   Patient presents with    Flank Pain     L flank pain x 1 month, pt was dx with a kidney infection on 4/3 and treated with IV abx in [place] before being discharged but pt states the kidney pain never went away     UTI     Pt endorses burning when she urinated, recently completed abx for a UTI at the end of March      Pt ambulatory to triage for above complaints, A+O x 4, GCS 15, NAD, answering all questions appropriately    UA cup provided, abdominal protocol ordered

## 2024-04-25 NOTE — ED NOTES
Bedside report received from off going RN/tech: Jony, assumed care of patient.  POC discussed with patient. Call light within reach, all needs addressed at this time.       Fall risk interventions in place: Move the patient closer to the nurse's station, Patient's personal possessions are with in their safe reach, and Keep floor surfaces clean and dry (all applicable per [employer] Fall risk assessment)   Continuous monitoring: Not Applicable   IVF/IV medications: Not Applicable   Oxygen: Room Air  Bedside sitter: Not Applicable   Isolation: Not Applicable

## 2024-04-25 NOTE — DISCHARGE INSTRUCTIONS
You were seen for evaluation of left flank pain. There is no cause identified in  your work up here but please schedule a follow up with your primary care doctor to discuss other nonemergent causes for your pain. Return to the ER if you have a fever, difficulty breathing or any other concerns.    Please take tylenol 1000 mg every 6 hours for your symptoms as well as ibuprofen 600 mg every 6 hours for your symptoms. I have prescribed flexeril to help for severe pain - this may make you sleepy so do not drink alcohol or drive after this

## 2024-04-26 ENCOUNTER — OFFICE VISIT (OUTPATIENT)
Dept: BEHAVIORAL HEALTH | Facility: CLINIC | Age: 29
End: 2024-04-26
Payer: COMMERCIAL

## 2024-04-26 DIAGNOSIS — F31.81 BIPOLAR 2 DISORDER (HCC): ICD-10-CM

## 2024-04-26 RX ORDER — LITHIUM CARBONATE 300 MG/1
300 CAPSULE ORAL
Qty: 90 CAPSULE | Refills: 0 | Status: SHIPPED | OUTPATIENT
Start: 2024-04-26 | End: 2024-07-25

## 2024-04-26 RX ORDER — LITHIUM CARBONATE 600 MG/1
600 CAPSULE ORAL
Qty: 90 CAPSULE | Refills: 0 | Status: SHIPPED | OUTPATIENT
Start: 2024-04-26 | End: 2024-07-25

## 2024-04-26 NOTE — PROGRESS NOTES
"Evaluation completed by: Aung Aly M.D.   Date of Service: 04/26/2024  Appointment type: in-office appointment.    Information below was collected from: patient      CHIEF COMPLIANT:  Medication Management      HPI:   Brenda Westbrook is a 29 y.o. old female who presents today for follow up appointment to address Medication Management    Patient reports improvement in mood since starting lithium.  -Thoughts are not racing  -Cognitive dulling has improved (less \"hazy\")  -Has been finding it difficult to stay focused while trying to study for her finals  -Finds it difficult to stay on task and often gets up to do something else  -Had a UTI that she thinks progressed to a kidney infection (had one in high school that felt the same - pain radiating to her back) after first antibiotic was not effective  -Wonders if changes in kidney function from lithium might have contributed - doesn't get UTIs very often  -No other concerns about adverse effects of lithium (urinating more but able to tolerate)    CURRENT MEDICATIONS    Current Outpatient Medications:     lithium 600 MG capsule, Take 1 Capsule by mouth before evening meal for 90 days., Disp: 90 Capsule, Rfl: 0    lithium carbonate 300 MG capsule, Take 1 Capsule by mouth every morning with breakfast for 90 days., Disp: 90 Capsule, Rfl: 0    lamoTRIgine (LAMICTAL) 100 MG Tab, Take 1 Tablet by mouth at bedtime for 90 days., Disp: 90 Tablet, Rfl: 0    DULoxetine (CYMBALTA) 20 MG Cap DR Particles, Take 1 Capsule by mouth every day for 90 days., Disp: 90 Capsule, Rfl: 0    amitriptyline (ELAVIL) 50 MG Tab, Take 1 Tablet by mouth every evening., Disp: 30 Tablet, Rfl: 5    Rimegepant Sulfate (NURTEC) 75 MG TABLET DISPERSIBLE, Take 1 Tablet by mouth every 48 hours., Disp: 15 Tablet, Rfl: 5    cyclobenzaprine (FLEXERIL) 10 mg Tab, Take 1 Tablet by mouth 3 times a day as needed for Moderate Pain., Disp: 15 Tablet, Rfl: 0    betamethasone, augmented, (DIPROLENE) 0.05 % " gel, as needed., Disp: , Rfl:     albuterol (PROVENTIL) 2.5 mg/0.5 mL Nebu Soln, Take  by nebulization one time., Disp: , Rfl:     budesonide-formoterol (SYMBICORT) 80-4.5 MCG/ACT Aerosol, Inhale 2 Puffs 2 times a day., Disp: , Rfl:         MEDICAL HISTORY  Past Medical History:   Diagnosis Date    Asthma, exercise induced     Bronchitis     Chlamydia     Oct 2011    Cold     02/6/15    Endometriosis     Head ache     Heart burn     Indigestion     Intractable migraine without aura and without status migrainosus 04/19/2017    Referral to neuro    Other specified symptom associated with female genital organs     endometriosis    Pain     pelvic    Pain 08/23/2012    stomach    Sleep apnea     tested twice - one pos, one neg    Sleep disturbances     Unspecified disorder of thyroid 2015    hyperactive in the past not taking any meds currently    Urinary bladder disorder     frequent uti's     Allergies:   Allergies   Allergen Reactions    Ciprofloxacin Vomiting    Codeine Hives     Tolerates oxycodone and hydromorphone    Doxycycline Itching and Vomiting    Keflex Itching    Morphine Unspecified and Hives     Patient reports could not breath.        SURGICAL HISTORY  Past Surgical History:   Procedure Laterality Date    MS CYSTOURETHROSCOPY N/A 4/12/2023    Procedure: CYSTOSCOPY;  Surgeon: Everardo Lara M.D.;  Location: [facility];  Service: Gen Robotic    MS TRANSPOSITION, OVARY(S) Bilateral 4/12/2023    Procedure: OOPHOROPEXY;  Surgeon: Everardo Lara M.D.;  Location: [facility];  Service: Gen Robotic    MS LAP,FULGURATE/EXCISE LESIONS N/A 4/12/2023    Procedure: FULGURATION, ENDOMETRIOSIS, LAPAROSCOPIC;  Surgeon: Everardo Lara M.D.;  Location: [facility];  Service: Gen Robotic    HYSTERECTOMY ROBOTIC XI N/A 4/12/2023    Procedure: ROBOTICALLY ASSISTED TOTAL LAPAROSCOPIC HYSTERECTOMY;  Surgeon: Everardo Lara M.D.;  Location: [facility];  Service: Gen Robotic    OVARIAN  CYSTECTOMY Left 2023    Procedure: LEFT OVARIAN CYSTECTOMY;  Surgeon: Everardo Lara M.D.;  Location: SURGERY [facility];  Service: Gen Robotic    PB KNEE SCOPE,MED/LAT MENISECTOMY Right 2022    Procedure: RIGHT KNEE ARTHROSCOPY, RIGHT LATERAL MENISCECTOMY AND HARDWARE REMOVAL REPAIRS AS INDICATED;  Surgeon: Rafat Alva M.D.;  Location: [facility];  Service: Orthopedics    PB KNEE SCOPE,MED/LAT MENISECTOMY Right 3/14/2022    Procedure: RIGHT KNEE ARTHROSCOPY, CHONDROPLASTY;  Surgeon: Rafat Alva M.D.;  Location: [facility];  Service: Orthopedics    MENISCAL REPAIR Right 3/14/2022    Procedure: REPAIR, MENISCUS, KNEE-lateral;  Surgeon: Rafat Alva M.D.;  Location: [facility];  Service: Orthopedics    REPEAT C SECTOIN WITH SALPINGECTOMY Bilateral 2021    Procedure:  SECTION, REPEAT, WITH SALPINGECTOMY;  Surgeon: Jorge Alvarenga M.D.;  [facility];  Service: Labor and Delivery    REPEAT C SECTION N/A 9/15/2018    Procedure: REPEAT C SECTION;  Surgeon: Rachna Patterson M.D.;  Location: [facility];  Service: Labor and Delivery    PRIMARY C SECTION Bilateral 2016    Procedure: PRIMARY C SECTION;  Surgeon: Trever Beckman M.D.;  Location: [facility] DEL[facility];  Service:     MANNY BY LAPAROSCOPY  3/12/2015    Procedure: MANNY BY LAPAROSCOPY;  Surgeon: Sean Mitchell M.D.;  Location: SURGERY [facility]S;  Service:     PELVISCOPY  2013    CYSTOSCOPY  2012    Performed by QUINTIN LOPES at SURGERY [facility]    PELVISCOPY  2012    Performed by CHRISTIANO PUGH at SURGERY SAME DAY [facility] ORS    MYRINGOTOMY      STRABISMUS REPAIR      gerardo eyes    TONSILLECTOMY          FAMILY PSYCHIATRIC HISTORY  Family History   Problem Relation Age of Onset    Other Mother         Migraine    Other Brother         Migraine    Diabetes Other     Hypertension Other      Stroke Other     Cancer Other     Heart Disease Other        SOCIAL HISTORY  Reviewed with patient and no changes.     SUBSTANCE USE HISTORY  Mental Status Exam    Appearance: Appropriate dress and grooming  Sensorium: Alert and Oriented X 4  Behavior: Appropriate  Motor Activity: Unremarkable  Eye Contact: Adequate  Speech: Normal (Comment: increased rate)  Mood: Euthymic  Affect: Congruent with normal range  Thought Flow: Linear  Thought Content: Unremarkable  Suicidality: Denies  Hallucinations: Denies  Cognition: Normal  Insight: Intact  Reliability: Apparently reliable  Judgement: Good           LABS:  Admission on 04/24/2024, Discharged on 04/25/2024   Component Date Value    WBC 04/24/2024 8.6     RBC 04/24/2024 4.17 (L)     Hemoglobin 04/24/2024 11.4 (L)     Hematocrit 04/24/2024 35.6 (L)     MCV 04/24/2024 85.4     MCH 04/24/2024 27.3     MCHC 04/24/2024 32.0 (L)     RDW 04/24/2024 47.3     Platelet Count 04/24/2024 345     MPV 04/24/2024 9.2     Neutrophils-Polys 04/24/2024 53.20     Lymphocytes 04/24/2024 36.30     Monocytes 04/24/2024 7.40     Eosinophils 04/24/2024 2.20     Basophils 04/24/2024 0.70     Immature Granulocytes 04/24/2024 0.20     Nucleated RBC 04/24/2024 0.00     Neutrophils (Absolute) 04/24/2024 4.59     Lymphs (Absolute) 04/24/2024 3.13     Monos (Absolute) 04/24/2024 0.64     Eos (Absolute) 04/24/2024 0.19     Baso (Absolute) 04/24/2024 0.06     Immature Granulocytes (a* 04/24/2024 0.02     NRBC (Absolute) 04/24/2024 0.00     Sodium 04/24/2024 141     Potassium 04/24/2024 3.7     Chloride 04/24/2024 106     Co2 04/24/2024 22     Anion Gap 04/24/2024 13.0     Glucose 04/24/2024 100 (H)     Bun 04/24/2024 10     Creatinine 04/24/2024 0.94     Calcium 04/24/2024 9.2     Correct Calcium 04/24/2024 8.6     AST(SGOT) 04/24/2024 16     ALT(SGPT) 04/24/2024 19     Alkaline Phosphatase 04/24/2024 52     Total Bilirubin 04/24/2024 <0.2     Albumin 04/24/2024 4.8     Total Protein 04/24/2024 7.0      Globulin 04/24/2024 2.2     A-G Ratio 04/24/2024 2.2     Lipase 04/24/2024 43     Color 04/24/2024 Yellow     Character 04/24/2024 Clear     Specific Gravity 04/24/2024 1.012     Ph 04/24/2024 6.0     Glucose 04/24/2024 Negative     Ketones 04/24/2024 Negative     Protein 04/24/2024 Negative     Bilirubin 04/24/2024 Negative     Urobilinogen, Urine 04/24/2024 0.2     Nitrite 04/24/2024 Negative     Leukocyte Esterase 04/24/2024 Negative     Occult Blood 04/24/2024 Negative     Micro Urine Req 04/24/2024 see below     GFR (CKD-EPI) 04/24/2024 84    Office Visit on 09/16/2023   Component Date Value    SARS-CoV-2 by PCR 09/16/2023 Positive (A)     Influenza virus A RNA 09/16/2023 Negative     Influenza virus B, PCR 09/16/2023 Negative     RSV, PCR 09/16/2023 Negative     POC Group A Strep, PCR 09/16/2023 Not Detected      CURRENT RISK ASSESSMENT       Suicide: Low       Homicide: Low       Self-Harm: Low       Relapse: Not applicable       Crisis Safety Plan Reviewed Not Indicated    [place]  records   reviewed.  No concerns about misuse of controlled substance.    ASSESSMENT  Brenda Westbrook is a 29 y.o. old female who presents today for follow up appointment to address Medication Management  Patient has been doing well since starting lithium.  She has noticed more ability to control/manage racing thoughts and is not as overwhelmed.  Her cognitive dulling has improved.  She continues to have trouble maintaining focus as she prepares for finals, which will be a four-hour testing session on a computer with no breaks.  Her initial lithium level was subtherapeutic and we will try increasing this from 600 mg with dinner by adding a morning dose of 300 mg with breakfast.  She can repeat the lab in [place] while after taking new dose for one week.  We discussed concerns about potential adverse effects from lithium, including changes in kidney function, increased urination, and increased white blood cell count - she  had a recent UTI/kidney infection and wondered if any of these were related to lithium.  We discussed adverse effects and will continue measuring lithium level throughout dose changes.  She has concerns about her ability to maintain focus while studying for her upcoming exams.    Today, we will increase lithium dose, which has been subtherapeutic on current dose.  Discussed concerns about maintaining focus for a 4-hour computer exam.    We will give the lithium a chance at increased dose, which may help with racing thoughts and ability to stay on task.  Once lithium is established at a therapeutic dose, we can reassess these problems.  Will avoid making diagnosis of ADHD until mood is adequately treated, and will also avoid starting stimulant medications until mood stabilizer is at effective dose.    DIAGNOSES/PLAN  1. Bipolar 2 disorder (HCC)    -Continue lithium with increase in total daily dose to 900 mg  Take 300 mg in the morning with breakfast and 600 mg in the evening with dinner    -Lithium level to be drawn at lab in [place] one week after addition of morning dose   Lab should be drawn around 12 hours after evening dose and before morning dose    See back in early June once her final [id number]  Medication options, alternatives (including no medications) and medication risks/benefits/side effects were discussed in detail.  The patient was advised to call, message clinician on [employer], or come in to the clinic if symptoms worsen or if questions/issues regarding their medications arise.  The patient verbalized understanding and agreement.    The patient was educated to call 911, call the suicide hotline, or go to the local ER if having thoughts of suicide or homicide.  The patient verbalized understanding and agreement.   The proposed treatment plan was discussed with the patient who was provided the opportunity to ask questions and make suggestions regarding alternative treatment. Patient verbalized  understanding and expressed agreement with the plan.      Return to clinic in 6 weeks or sooner if symptoms worsen.    This appointment was supervised by attending psychiatrist, who agrees with assessment and treatment plan.  See attending attestation for more details.     Aung Aly M.D.

## 2024-05-08 DIAGNOSIS — F31.81 BIPOLAR 2 DISORDER (HCC): ICD-10-CM

## 2024-05-08 DIAGNOSIS — Z51.81 THERAPEUTIC DRUG MONITORING: ICD-10-CM

## 2024-05-20 ENCOUNTER — HOSPITAL ENCOUNTER (OUTPATIENT)
Dept: LAB | Facility: MEDICAL CENTER | Age: 29
End: 2024-05-20
Attending: STUDENT IN AN ORGANIZED HEALTH CARE EDUCATION/TRAINING PROGRAM
Payer: COMMERCIAL

## 2024-05-20 DIAGNOSIS — F31.81 BIPOLAR 2 DISORDER (HCC): ICD-10-CM

## 2024-05-20 DIAGNOSIS — Z51.81 THERAPEUTIC DRUG MONITORING: ICD-10-CM

## 2024-05-20 LAB — LITHIUM SERPL-MCNC: 0.6 MMOL/L (ref 0.6–1.2)

## 2024-05-28 ENCOUNTER — OFFICE VISIT (OUTPATIENT)
Dept: NEUROLOGY | Facility: MEDICAL CENTER | Age: 29
End: 2024-05-28
Attending: PSYCHIATRY & NEUROLOGY
Payer: COMMERCIAL

## 2024-05-28 VITALS
OXYGEN SATURATION: 99 % | TEMPERATURE: 99.7 F | BODY MASS INDEX: 28.59 KG/M2 | SYSTOLIC BLOOD PRESSURE: 112 MMHG | DIASTOLIC BLOOD PRESSURE: 76 MMHG | HEIGHT: 63 IN | RESPIRATION RATE: 18 BRPM | WEIGHT: 161.38 LBS | HEART RATE: 88 BPM

## 2024-05-28 DIAGNOSIS — G43.009 MIGRAINE WITHOUT AURA AND WITHOUT STATUS MIGRAINOSUS, NOT INTRACTABLE: Primary | ICD-10-CM

## 2024-05-28 PROCEDURE — 99213 OFFICE O/P EST LOW 20 MIN: CPT | Performed by: PSYCHIATRY & NEUROLOGY

## 2024-05-28 PROCEDURE — 3078F DIAST BP <80 MM HG: CPT | Performed by: PSYCHIATRY & NEUROLOGY

## 2024-05-28 PROCEDURE — 3074F SYST BP LT 130 MM HG: CPT | Performed by: PSYCHIATRY & NEUROLOGY

## 2024-05-28 RX ORDER — AMITRIPTYLINE HYDROCHLORIDE 50 MG/1
100 TABLET, FILM COATED ORAL NIGHTLY
Qty: 60 TABLET | Refills: 5 | Status: SHIPPED | OUTPATIENT
Start: 2024-05-28

## 2024-05-28 RX ORDER — ELETRIPTAN HYDROBROMIDE 40 MG/1
TABLET, FILM COATED ORAL
Qty: 9 TABLET | Refills: 4 | Status: SHIPPED | OUTPATIENT
Start: 2024-05-28

## 2024-05-28 ASSESSMENT — FIBROSIS 4 INDEX: FIB4 SCORE: 0.32

## 2024-05-28 ASSESSMENT — ENCOUNTER SYMPTOMS
CONSTIPATION: 0
HEADACHES: 1

## 2024-05-28 NOTE — PROGRESS NOTES
"Subjective     Brenda Westbrook is a 29 y.o. female who presents with her  Doni, for follow-up, with a history of migraine without aura.     HPI    Since last seen, though I had wanted to increase Tofranil to help with the headaches, because of the lack of availability, we changed to Elavil 50 mg nightly.  This drug actually is doing a better job than the Tofranil and providing headache control.  She reaches for rescue less commonly, but unfortunately the rescue medicine Nurtec, vies no benefit whatsoever.  She is already failed Anaprox, Fioricet and Treximet from the past.  She has been on no other prescription medications for rescue.    She tolerates the Elavil without issues of constipation, cognitive impairment, dry mouth, urinary retention, change in appetite, dry mouth, etc.  The medication does help her sleep effectively without any daytime soporific side effects.    She actually has days with only mild headache or none at all, though she still will get 1 headache once or twice every week for which rescue was used.    Medical, surgical and family histories are reviewed, there are no new drug allergies.  She and her therapist are trying to adjust medications, recently lithium was increased with resulting daytime drowsiness.  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  She is in town for couple more months and then back to [place].  She is on Elavil 50 mg nightly, Nurtec ODT 75 mg as needed, Cymbalta 20 mg daily, Lamictal 100 mg nightly, lithium 300 mg in the morning and 600 mg in the evening, Proventil and Symbicort.    Review of Systems   Gastrointestinal:  Negative for constipation.   Neurological:  Positive for headaches.   All other systems reviewed and are negative.    Objective     /76 (BP Location: Right arm, Patient Position: Sitting, BP Cuff Size: Adult)   Pulse 88   Temp 37.6 °C (99.7 °F) (Temporal)   Resp 18   Ht 1.6 m (5' 3\")   Wt 73.2 " kg (161 lb 6 oz)   LMP 02/22/2023   SpO2 99%   BMI 28.59 kg/m²      Physical Exam    She appears in no acute distress.  Vital signs are stable.  There is no malar rash, temporal or jaw tenderness, or jaw claudication.  The neck is supple.  Cardiac evaluation is unremarkable.  There is no lower extremity edema.     Neurological Exam    Fully oriented, there is no aphasia, apraxia, or inattention.    PERRLA/EOMI, visual fields are full, facial movements are symmetric, sensory exam is intact to temperature.  The tongue and uvula are midline, there is no bulbar dysfunction.  Shoulder shrug and head rotation are normal.    Sensory exam is intact to temperature and vibration.    Musculoskeletal exam reveals normal tone throughout, there is no tremor or drift.  Strength is intact throughout.  Reflexes are brisk and present at all points and there are no asymmetries.  None are dropped.  The toes are downgoing bilaterally.    Coordination exam reveals gait to be normal and station and stride length.  There is no appendicular dystaxia.  Fine motor control with repetitive movements are normal throughout.    Assessment & Plan     1. Migraine without aura and without status migrainosus, not intractable  I will increase the Elavil to a 100 mg dose while she is still around town to make sure she tolerates the drug well.  Hopefully they can get her off the higher dose of lithium as well as the Cymbalta.  Hopefully the TCA dosing will not result in worsening of the bipolar disease with more rapid cycling.  She has been stable so far, but I am concerned that they want to increase the lithium.  Regardless, she was made aware of this.  She tolerates the Elavil without issue otherwise.    For rescue, Relpax 40 mg will be used instead of very high doses of Tylenol and an ineffective medication, Nurtec.  It is taken in the first pain onset, can be repeated within an hour or longer if needed.  He can communicate via [employer] in the  interim, otherwise we will follow-up towards the end of October during the [place] Day celebrations.    - amitriptyline (ELAVIL) 50 MG Tab; Take 2 Tablets by mouth every evening.  Dispense: 60 Tablet; Refill: 5  - eletriptan (RELPAX) 40 MG tablet; 1 tab at headache onset; repeat in 1 hour prn  Dispense: 9 Tablet; Refill: 4    Time: 20 minutes in total spent on patient care including creatinine charting, record review, discussion with healthcare staff and documentation.  This includes face-to-face time for exam, review, discussion, as well as counseling and coordinating care.

## 2024-06-03 ENCOUNTER — APPOINTMENT (OUTPATIENT)
Dept: BEHAVIORAL HEALTH | Facility: CLINIC | Age: 29
End: 2024-06-03
Payer: COMMERCIAL

## 2024-06-03 DIAGNOSIS — F31.81 BIPOLAR 2 DISORDER (HCC): ICD-10-CM

## 2024-06-03 PROCEDURE — 99214 OFFICE O/P EST MOD 30 MIN: CPT | Mod: GC | Performed by: PSYCHIATRY & NEUROLOGY

## 2024-06-03 RX ORDER — LITHIUM CARBONATE 600 MG/1
600 CAPSULE ORAL
Qty: 90 CAPSULE | Refills: 0 | Status: SHIPPED | OUTPATIENT
Start: 2024-06-03 | End: 2024-06-28 | Stop reason: SDUPTHER

## 2024-06-03 RX ORDER — DULOXETIN HYDROCHLORIDE 20 MG/1
20 CAPSULE, DELAYED RELEASE ORAL DAILY
Qty: 90 CAPSULE | Refills: 0 | Status: SHIPPED | OUTPATIENT
Start: 2024-06-03 | End: 2024-09-01

## 2024-06-03 RX ORDER — LAMOTRIGINE 150 MG/1
150 TABLET ORAL
Qty: 90 TABLET | Refills: 0 | Status: SHIPPED | OUTPATIENT
Start: 2024-06-03 | End: 2024-06-28 | Stop reason: SDUPTHER

## 2024-06-17 DIAGNOSIS — G43.009 MIGRAINE WITHOUT AURA AND WITHOUT STATUS MIGRAINOSUS, NOT INTRACTABLE: ICD-10-CM

## 2024-06-17 RX ORDER — AMITRIPTYLINE HYDROCHLORIDE 100 MG/1
100 TABLET ORAL NIGHTLY
Qty: 30 TABLET | Refills: 11 | Status: SHIPPED | OUTPATIENT
Start: 2024-06-17 | End: 2024-06-28 | Stop reason: SDUPTHER

## 2024-06-28 ENCOUNTER — APPOINTMENT (OUTPATIENT)
Dept: BEHAVIORAL HEALTH | Facility: CLINIC | Age: 29
End: 2024-06-28
Payer: COMMERCIAL

## 2024-06-28 DIAGNOSIS — F31.81 BIPOLAR 2 DISORDER (HCC): ICD-10-CM

## 2024-06-28 DIAGNOSIS — F90.9 ATTENTION DEFICIT HYPERACTIVITY DISORDER (ADHD), UNSPECIFIED ADHD TYPE: ICD-10-CM

## 2024-06-28 DIAGNOSIS — G43.009 MIGRAINE WITHOUT AURA AND WITHOUT STATUS MIGRAINOSUS, NOT INTRACTABLE: ICD-10-CM

## 2024-06-28 PROCEDURE — 99214 OFFICE O/P EST MOD 30 MIN: CPT | Performed by: PSYCHIATRY & NEUROLOGY

## 2024-06-28 RX ORDER — LISDEXAMFETAMINE DIMESYLATE 30 MG/1
30 CAPSULE ORAL EVERY MORNING
Qty: 30 CAPSULE | Refills: 0 | Status: SHIPPED | OUTPATIENT
Start: 2024-07-28 | End: 2024-08-27

## 2024-06-28 RX ORDER — LITHIUM CARBONATE 600 MG/1
600 CAPSULE ORAL
Qty: 90 CAPSULE | Refills: 0 | Status: SHIPPED | OUTPATIENT
Start: 2024-06-28 | End: 2024-09-26

## 2024-06-28 RX ORDER — AMITRIPTYLINE HYDROCHLORIDE 100 MG/1
150 TABLET ORAL NIGHTLY
Qty: 135 TABLET | Refills: 0 | Status: SHIPPED | OUTPATIENT
Start: 2024-06-28 | End: 2024-09-26

## 2024-06-28 RX ORDER — LAMOTRIGINE 150 MG/1
150 TABLET ORAL
Qty: 90 TABLET | Refills: 0 | Status: SHIPPED | OUTPATIENT
Start: 2024-06-28 | End: 2024-09-26

## 2024-06-28 RX ORDER — LISDEXAMFETAMINE DIMESYLATE 30 MG/1
30 CAPSULE ORAL EVERY MORNING
Qty: 30 CAPSULE | Refills: 0 | Status: SHIPPED | OUTPATIENT
Start: 2024-06-28 | End: 2024-07-28

## 2024-07-15 ENCOUNTER — OFFICE VISIT (OUTPATIENT)
Dept: URGENT CARE | Facility: PHYSICIAN GROUP | Age: 29
End: 2024-07-15
Payer: COMMERCIAL

## 2024-07-15 ENCOUNTER — HOSPITAL ENCOUNTER (OUTPATIENT)
Dept: RADIOLOGY | Facility: MEDICAL CENTER | Age: 29
End: 2024-07-15
Payer: COMMERCIAL

## 2024-07-15 VITALS
HEART RATE: 79 BPM | BODY MASS INDEX: 29.23 KG/M2 | HEIGHT: 63 IN | RESPIRATION RATE: 19 BRPM | TEMPERATURE: 98.9 F | WEIGHT: 165 LBS | OXYGEN SATURATION: 98 % | SYSTOLIC BLOOD PRESSURE: 118 MMHG | DIASTOLIC BLOOD PRESSURE: 72 MMHG

## 2024-07-15 DIAGNOSIS — S86.912A KNEE STRAIN, LEFT, INITIAL ENCOUNTER: ICD-10-CM

## 2024-07-15 PROCEDURE — 99213 OFFICE O/P EST LOW 20 MIN: CPT

## 2024-07-15 PROCEDURE — 73562 X-RAY EXAM OF KNEE 3: CPT | Mod: LT

## 2024-07-15 PROCEDURE — 3074F SYST BP LT 130 MM HG: CPT

## 2024-07-15 PROCEDURE — 3078F DIAST BP <80 MM HG: CPT

## 2024-07-15 ASSESSMENT — FIBROSIS 4 INDEX: FIB4 SCORE: 0.32

## 2024-07-15 ASSESSMENT — ENCOUNTER SYMPTOMS: FALLS: 0

## 2024-08-07 ENCOUNTER — OFFICE VISIT (OUTPATIENT)
Dept: BEHAVIORAL HEALTH | Facility: CLINIC | Age: 29
End: 2024-08-07
Payer: COMMERCIAL

## 2024-08-07 DIAGNOSIS — F90.9 ATTENTION DEFICIT HYPERACTIVITY DISORDER (ADHD), UNSPECIFIED ADHD TYPE: ICD-10-CM

## 2024-08-07 DIAGNOSIS — F31.81 BIPOLAR 2 DISORDER (HCC): ICD-10-CM

## 2024-08-07 PROCEDURE — 99214 OFFICE O/P EST MOD 30 MIN: CPT | Mod: GC

## 2024-08-07 RX ORDER — LISDEXAMFETAMINE DIMESYLATE 30 MG/1
30 CAPSULE ORAL EVERY MORNING
Qty: 30 CAPSULE | Refills: 0 | Status: SHIPPED | OUTPATIENT
Start: 2024-08-07 | End: 2024-09-06

## 2024-08-07 NOTE — PROGRESS NOTES
"PSYCHIATRIC EVALUATION    A full psychiatric evaluation was performed due to transition of care to new resident/fellow physician. All elements of a psychiatric evaluation were reviewed to ensure safe and effective care with transition.     Evaluation completed by: Desirae Olson M.D.   Date of Service: 08/07/2024  Appointment type: in-office appointment.  Attending: Hammad Trotter MD  Information below was collected from: patient    CHIEF COMPLIANT:  \"bipolar disorder II and ADHD\"    HPI:   Brenda Westbrook is a 29 y.o. old female who presents today for new psychiatric evaluation for the assessment of bipolar disorder type II and ADHD. Patient is transferring care from Dr. Aly, last seen in clinic on 6/28 when plan was to start Vyvanse 30mg and continue Lamictal 150mg, Lithium 600mg, Elavil 100mg. Last hypomania episode was around February-March which lasted for 2 weeks felt euphoric, decreased sleep, cleaning. Lithium was increased to 900mg at the time, became extremely tired so decreased back to 600mg and increased lamotrigine. She reports first episode of betzy was in high school.     She is studying law in [place] and returns to [place] every month to see her family and children (8, 6, 3). She reports that she has noticed an improvement in attention and concentration on Vyvanse. Patient reports slight decreased appetite, no issues with sleep. She is working full-time as a  at small firm here in [facility] and noticed improvement in executive functioning, distractibility, sustaining attention. She reports in law school during 4 hours exams she would zone out prison because she could not sustain attention, difficulty paying attention in lecture.     PSYCHIATRIC REVIEW OF SYSTEMS: current symptoms as reported by pt.  Depression: Denies depressed mood or anhedonia  Betzy: Patient denies any change in mood, increased energy, or marked irritability  Anxiety/Panic Attacks: Denies any anxiety associated " symptoms  Trauma: Patient reports no signs or symptoms indicative of PTSD  Psychosis: Patient reports no signs or symptoms indicative of psychosis  ADHD: has difficulty sustaining attention in tasks or play activities, has difficulty organizing tasks and activities, avoids engaging in tasks that require sustained attention    REVIEW OF SYSTEMS   ROS    PAST PSYCHIATRIC HISTORY  Inpatient Psychiatric Hospitalizations: denies  Outpatient Psychiatric Care: Dr. Aly  Previous: Dr. Burgos, therapist [facility]  Psychiatric Medications: Vyvanse 30mg, Lamictal 150mg, Lithium 600mg, Elavil   Previous:  Celexa (2008 for 7 years but does not remember efficacy), Zoloft (post-partum), Lexapro (post-partum), Wellbutrin (no effect), duloxetine  Self Harm: denies  Suicide Attempts: denies    FAMILY PSYCHIATRIC HISTORY  Maternal history of depression (Celexa)  Brother history of depression, anxiety and possibly ADHD  Middle child has ADHD    SOCIAL HISTORY  Patient lives in [facility]. 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     SUBSTANCE USE  Alcohol: socially every 4-5 months  Tobacco: denies  Marijuana: denies  Other: denies    PAST MEDICAL HISTORY  Past Medical History:   Diagnosis Date    Asthma, exercise induced     Bronchitis     Chlamydia     Oct 2011    Cold     02/6/15    Endometriosis     Head ache     Heart burn     Indigestion     Intractable migraine without aura and without status migrainosus 04/19/2017    Referral to neuro    Other specified symptom associated with female genital organs     endometriosis    Pain     pelvic    Pain 08/23/2012    stomach    Sleep apnea     tested twice - one pos, one neg    Sleep disturbances     Unspecified disorder of thyroid 2015    hyperactive in the past not taking any meds currently    Urinary bladder disorder     frequent uti's     Allergies   Allergen Reactions    Ciprofloxacin Vomiting    Codeine Hives     Tolerates  oxycodone and hydromorphone    Doxycycline Itching and Vomiting    Keflex Itching    Morphine Unspecified and Hives     Patient reports could not breath.      Past Surgical History:   Procedure Laterality Date    RI CYSTOURETHROSCOPY N/A 2023    Procedure: CYSTOSCOPY;  Surgeon: Everardo Lara M.D.;  Location: [facility];  Service: Gen Robotic    RI TRANSPOSITION, OVARY(S) Bilateral 2023    Procedure: OOPHOROPEXY;  Surgeon: Everardo Lara M.D.;  Location: [facility];  Service: Gen Robotic    RI LAP,FULGURATE/EXCISE LESIONS N/A 2023    Procedure: FULGURATION, ENDOMETRIOSIS, LAPAROSCOPIC;  Surgeon: Everardo Lara M.D.;  Location: [facility];  Service: Gen Robotic    HYSTERECTOMY ROBOTIC XI N/A 2023    Procedure: ROBOTICALLY ASSISTED TOTAL LAPAROSCOPIC HYSTERECTOMY;  Surgeon: Everardo Lara M.D.;  Location: [facility];  Service: Gen Robotic    OVARIAN CYSTECTOMY Left 2023    Procedure: LEFT OVARIAN CYSTECTOMY;  Surgeon: Everardo Lara M.D.;  Location: [facility];  Service: Gen Robotic    PB KNEE SCOPE,MED/LAT MENISECTOMY Right 2022    Procedure: RIGHT KNEE ARTHROSCOPY, RIGHT LATERAL MENISCECTOMY AND HARDWARE REMOVAL REPAIRS AS INDICATED;  Surgeon: Rafat Alva M.D.;  Location: [facility];  Service: Orthopedics    PB KNEE SCOPE,MED/LAT MENISECTOMY Right 3/14/2022    Procedure: RIGHT KNEE ARTHROSCOPY, CHONDROPLASTY;  Surgeon: Rafat Alva M.D.;  Location: [facility];  Service: Orthopedics    MENISCAL REPAIR Right 3/14/2022    Procedure: REPAIR, MENISCUS, KNEE-lateral;  Surgeon: Rafat Alva M.D.;  Location: [facility];  Service: Orthopedics    REPEAT C SECTOIN WITH SALPINGECTOMY Bilateral 2021    Procedure:  SECTION, REPEAT, WITH SALPINGECTOMY;  Surgeon: Jorge Alvarenga M.D.;  [facility];  Service: Labor and Delivery    REPEAT C SECTION N/A  9/15/2018    Procedure: REPEAT C SECTION;  Surgeon: Rachna Patterson M.D.;  [facility]: [facility];  Service: Labor and Delivery    PRIMARY C SECTION Bilateral 6/11/2016    Procedure: PRIMARY C SECTION;  Surgeon: Trever Beckman M.D.;  Location: [facility];  Service:     MANNY BY LAPAROSCOPY  3/12/2015    Procedure: MANNY BY LAPAROSCOPY;  Surgeon: Sean Mitchell M.D.;  Location: SURGERY [facility];  Service:     PELVISCOPY  2013    CYSTOSCOPY  8/30/2012    Performed by QUINTIN LOPES at SURGERY [facility]    PELVISCOPY  4/19/2012    Performed by CHRISTIANO PUGH at SURGERY SAME DAY [facility]    MYRINGOTOMY      STRABISMUS REPAIR      gerardo eyes    TONSILLECTOMY        Family History   Problem Relation Age of Onset    Other Mother         Migraine    Other Brother         Migraine    Diabetes Other     Hypertension Other     Stroke Other     Cancer Other     Heart Disease Other      Social History     Socioeconomic History    Marital status:     Highest education level: 12th grade   Tobacco Use    Smoking status: Never     Passive exposure: Never    Smokeless tobacco: Never   Vaping Use    Vaping status: Never Used   Substance and Sexual Activity    Alcohol use: Not Currently     Comment: occasionally - 2 times per month    Drug use: No    Sexual activity: Yes     Partners: Male     Birth control/protection: Condom, Female Sterilization   Social History Narrative    ** Merged History Encounter **          Social Determinants of Health     Financial Resource Strain: Low Risk  (2/4/2021)    Overall Financial Resource Strain (CARDIA)     Difficulty of Paying Living Expenses: Not very hard   Food Insecurity: No Food Insecurity (2/4/2021)    Hunger Vital Sign     Worried About Running Out of Food in the Last Year: Never true     Ran Out of Food in the Last Year: Never true   Transportation Needs: No Transportation Needs (2/4/2021)    PRAPARE - Transportation      Lack of Transportation (Medical): No     Lack of Transportation (Non-Medical): No   Physical Activity: Inactive (2/4/2021)    Exercise Vital Sign     Days of Exercise per Week: 0 days     Minutes of Exercise per Session: 0 min   Stress: No Stress Concern Present (2/4/2021)    [employer] - Occupational Stress Questionnaire     Feeling of Stress : Only a little   Social Connections: Moderately Isolated (2/4/2021)    Social Connection and Isolation Panel [NHANES]     Frequency of Communication with Friends and Family: Three times a week     Frequency of Social Gatherings with Friends and Family: Once a week     Attends Scientologist Services: Never     Active Member of Clubs or Organizations: No     Attends Club or Organization Meetings: Never     Marital Status:    Housing Stability: Low Risk  (2/4/2021)    Housing Stability Vital Sign     Unable to Pay for Housing in the Last Year: No     Number of Places Lived in the Last Year: 2     Unstable Housing in the Last Year: No     Past Surgical History:   Procedure Laterality Date    KY CYSTOURETHROSCOPY N/A 4/12/2023    Procedure: CYSTOSCOPY;  Surgeon: Everardo Lara M.D.;  Location: SURGERY [facility];  Service: Gen Robotic    KY TRANSPOSITION, OVARY(S) Bilateral 4/12/2023    Procedure: OOPHOROPEXY;  Surgeon: Everarod Lara M.D.;  Location: SURGERY [facility];  Service: Gen Robotic    KY LAP,FULGURATE/EXCISE LESIONS N/A 4/12/2023    Procedure: FULGURATION, ENDOMETRIOSIS, LAPAROSCOPIC;  Surgeon: Everardo Lara M.D.;  Location: SURGERY [facility];  Service: Gen Robotic    HYSTERECTOMY ROBOTIC XI N/A 4/12/2023    Procedure: ROBOTICALLY ASSISTED TOTAL LAPAROSCOPIC HYSTERECTOMY;  Surgeon: Everardo Lara M.D.;  Location: SURGERY [facility];  Service: Gen Robotic    OVARIAN CYSTECTOMY Left 4/12/2023    Procedure: LEFT OVARIAN CYSTECTOMY;  Surgeon: Everardo Lara M.D.;  Location: SURGERY [facility];  Service: Gen Robotic    PB KNEE  SCOPE,MED/LAT MENISECTOMY Right 2022    Procedure: RIGHT KNEE ARTHROSCOPY, RIGHT LATERAL MENISCECTOMY AND HARDWARE REMOVAL REPAIRS AS INDICATED;  Surgeon: Rafat Alva M.D.;  Location: [facility];  Service: Orthopedics    PB KNEE SCOPE,MED/LAT MENISECTOMY Right 3/14/2022    Procedure: RIGHT KNEE ARTHROSCOPY, CHONDROPLASTY;  Surgeon: Rafat Alav M.D.;  Location: [facility];  Service: Orthopedics    MENISCAL REPAIR Right 3/14/2022    Procedure: REPAIR, MENISCUS, KNEE-lateral;  Surgeon: Rafat Alva M.D.;  Location: [facility];  Service: Orthopedics    REPEAT C SECTOIN WITH SALPINGECTOMY Bilateral 2021    Procedure:  SECTION, REPEAT, WITH SALPINGECTOMY;  Surgeon: Jorge Alvarenga M.D.;  Location: [facility];  Service: Labor and Delivery    REPEAT C SECTION N/A 9/15/2018    Procedure: REPEAT C SECTION;  Surgeon: Rachna Patterson M.D.;  Location: [facility];  Service: Labor and Delivery    PRIMARY C SECTION Bilateral 2016    Procedure: PRIMARY C SECTION;  Surgeon: Trever Beckman M.D.;  Location: [facility];  Service:     MANNY BY LAPAROSCOPY  3/12/2015    Procedure: MANNY BY LAPAROSCOPY;  Surgeon: Sean Mitchell M.D.;  Location: SURGERY [facility];  Service:     PELVISCOPY  2013    CYSTOSCOPY  2012    Performed by QUINTIN LOPES at SURGERY [facility]    PELVISCOPY  2012    Performed by CHRISTIANO PUGH at SURGERY SAME DAY [facility]    MYRINGOTOMY      STRABISMUS REPAIR      gerardo eyes    TONSILLECTOMY         PSYCHIATRIC EXAMINATION   LMP 2023   Musculoskeletal: No abnormal movements noted  Appearance: well-developed, cooperative and engaged  Thought Process:  linear and coherent  Abnormal or Psychotic Thoughts: No evidence of auditory or visual hallucinations, and no overt delusions noted  Speech: regular rate, rhythm, volume, tone, and syntax  "and coherent  Mood: \"good\"  Affect: euthymic  SI/HI: Denies SI and HI  Orientation: alert and oriented  Recent and Remote Memory: no gross impairment in immediate, recent, or remote memory  Attention Span and Concentration: intact  Insight/Judgement into symptoms: good  Neurological Testing (MSSE Score and/or clock drawing): MMSE performed and wnl      SCREENINGS:      4/1/2022     1:40 PM 11/20/2023    10:00 AM 1/3/2024     2:00 PM   Depression Screen (PHQ-2/PHQ-9)   PHQ-2 Total Score 0 0 0         1/16/2024     3:21 PM 1/7/2024     9:10 PM    JAMAL-7 ANXIETY SCALE FLOWSHEET   Feeling nervous, anxious, or on edge 1 1   Not being able to stop or control worrying 0 0   Worrying too much about different things 0 0   Trouble relaxing 0 0   Being so restless that it is hard to sit still 0 0   Becoming easily annoyed or irritable 1 1   Feeling afraid as if something awful might happen 0 0   JAMAL-7 Total Score 2 2   How difficult have these problems made it for you to do your work, take care of things at home, or get along with other people? Not difficult at all Not difficult at all     ASSESSMENT  Brenda Westbrook is a 29 y.o. old female who presents today for new psychiatric evaluation for the assessment of bipolar disorder 2 and ADHD. Patient is transferring care from Dr. Aly, last seen 6/28 and plan was to initiate Vyvanse 30mg daily. 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 No recent hypomania episodes, most recent was in February/March. Her mood continues to be stable on Lithium 600mg and Lamictal 150mg daily. She also takes amitriptyline 100mg qHS for migraines. Discussed with patient risks and benefits of being on Lithium and will consider decreasing or transitioning to medication with lower side effect profile in the future.     [place]  records   reviewed.  No concerns about misuse of controlled substance.    CURRENT " RISK ASSESSMENT       Suicide: Low       Homicide: Low       Self-Harm: Low       Relapse: Low       Crisis Safety Plan Reviewed Not Indicated    DIAGNOSES/PLAN  Problem List Items Addressed This Visit          Psychiatry Problems    Bipolar 2 disorder (HCC)     Other Visit Diagnoses       Attention deficit hyperactivity disorder (ADHD), unspecified ADHD type        Relevant Medications    lisdexamfetamine (VYVANSE) 30 MG capsule    Other Relevant Orders    THYROID PANEL    CBC WITH DIFFERENTIAL    LITHIUM    Comp Metabolic Panel           -Continue lithium 600mg daily  -Continue lamictal 150mg daily  -Continue Vyvanse 30mg daily     Medication options, alternatives (including no medications) and medication risks/benefits/side effects were discussed in detail.  The patient was advised to call, message clinician on [employer], or come in to the clinic if symptoms worsen or if questions/issues regarding their medications arise.  The patient verbalized understanding and agreement.    The patient was educated to call 911, call the suicide hotline, or go to the local ER if having thoughts of suicide or homicide.  The patient verbalized understanding and agreement.   The proposed treatment plan was discussed with the patient who was provided the opportunity to ask questions and make suggestions regarding alternative treatment. Patient verbalized understanding and expressed agreement with the plan.    Follow up in October (patient returning from law school)      This appointment was supervised by attending psychiatrist, Hammad Trotter MD who agrees with assessment and treatment plan.  See attending attestation for more details.

## 2024-08-19 ENCOUNTER — HOSPITAL ENCOUNTER (OUTPATIENT)
Dept: LAB | Facility: MEDICAL CENTER | Age: 29
End: 2024-08-19
Payer: COMMERCIAL

## 2024-08-19 DIAGNOSIS — F90.9 ATTENTION DEFICIT HYPERACTIVITY DISORDER (ADHD), UNSPECIFIED ADHD TYPE: ICD-10-CM

## 2024-08-19 LAB
BASOPHILS # BLD AUTO: 0.8 % (ref 0–1.8)
BASOPHILS # BLD: 0.06 K/UL (ref 0–0.12)
EOSINOPHIL # BLD AUTO: 0.17 K/UL (ref 0–0.51)
EOSINOPHIL NFR BLD: 2.3 % (ref 0–6.9)
ERYTHROCYTE [DISTWIDTH] IN BLOOD BY AUTOMATED COUNT: 46.6 FL (ref 35.9–50)
HCT VFR BLD AUTO: 39.1 % (ref 37–47)
HGB BLD-MCNC: 12.2 G/DL (ref 12–16)
IMM GRANULOCYTES # BLD AUTO: 0.02 K/UL (ref 0–0.11)
IMM GRANULOCYTES NFR BLD AUTO: 0.3 % (ref 0–0.9)
LITHIUM SERPL-MCNC: 0.3 MMOL/L (ref 0.6–1.2)
LYMPHOCYTES # BLD AUTO: 2.22 K/UL (ref 1–4.8)
LYMPHOCYTES NFR BLD: 29.8 % (ref 22–41)
MCH RBC QN AUTO: 27.7 PG (ref 27–33)
MCHC RBC AUTO-ENTMCNC: 31.2 G/DL (ref 32.2–35.5)
MCV RBC AUTO: 88.9 FL (ref 81.4–97.8)
MONOCYTES # BLD AUTO: 0.43 K/UL (ref 0–0.85)
MONOCYTES NFR BLD AUTO: 5.8 % (ref 0–13.4)
NEUTROPHILS # BLD AUTO: 4.55 K/UL (ref 1.82–7.42)
NEUTROPHILS NFR BLD: 61 % (ref 44–72)
NRBC # BLD AUTO: 0 K/UL
NRBC BLD-RTO: 0 /100 WBC (ref 0–0.2)
PLATELET # BLD AUTO: 365 K/UL (ref 164–446)
PMV BLD AUTO: 9.7 FL (ref 9–12.9)
RBC # BLD AUTO: 4.4 M/UL (ref 4.2–5.4)
T4 FREE SERPL-MCNC: 1.02 NG/DL (ref 0.93–1.7)
WBC # BLD AUTO: 7.5 K/UL (ref 4.8–10.8)

## 2024-08-19 PROCEDURE — 85025 COMPLETE CBC W/AUTO DIFF WBC: CPT

## 2024-08-19 PROCEDURE — 80178 ASSAY OF LITHIUM: CPT

## 2024-08-19 PROCEDURE — 36415 COLL VENOUS BLD VENIPUNCTURE: CPT

## 2024-08-19 PROCEDURE — 84439 ASSAY OF FREE THYROXINE: CPT

## 2024-09-03 DIAGNOSIS — F90.9 ATTENTION DEFICIT HYPERACTIVITY DISORDER (ADHD), UNSPECIFIED ADHD TYPE: ICD-10-CM

## 2024-09-03 DIAGNOSIS — F31.81 BIPOLAR 2 DISORDER (HCC): ICD-10-CM

## 2024-09-03 RX ORDER — LISDEXAMFETAMINE DIMESYLATE 40 MG/1
40 CAPSULE ORAL DAILY
Qty: 30 CAPSULE | Refills: 0 | Status: SHIPPED | OUTPATIENT
Start: 2024-09-03 | End: 2024-10-03

## 2024-09-14 ENCOUNTER — HOSPITAL ENCOUNTER (OUTPATIENT)
Dept: LAB | Facility: MEDICAL CENTER | Age: 29
End: 2024-09-14
Payer: COMMERCIAL

## 2024-09-14 DIAGNOSIS — F90.9 ATTENTION DEFICIT HYPERACTIVITY DISORDER (ADHD), UNSPECIFIED ADHD TYPE: ICD-10-CM

## 2024-09-14 LAB
ALBUMIN SERPL BCP-MCNC: 4.3 G/DL (ref 3.2–4.9)
ALBUMIN/GLOB SERPL: 2 G/DL
ALP SERPL-CCNC: 58 U/L (ref 30–99)
ALT SERPL-CCNC: 11 U/L (ref 2–50)
ANION GAP SERPL CALC-SCNC: 12 MMOL/L (ref 7–16)
AST SERPL-CCNC: 10 U/L (ref 12–45)
BILIRUB SERPL-MCNC: <0.2 MG/DL (ref 0.1–1.5)
BUN SERPL-MCNC: 8 MG/DL (ref 8–22)
CALCIUM ALBUM COR SERPL-MCNC: 9.6 MG/DL (ref 8.5–10.5)
CALCIUM SERPL-MCNC: 9.8 MG/DL (ref 8.5–10.5)
CHLORIDE SERPL-SCNC: 106 MMOL/L (ref 96–112)
CO2 SERPL-SCNC: 21 MMOL/L (ref 20–33)
CREAT SERPL-MCNC: 0.9 MG/DL (ref 0.5–1.4)
GFR SERPLBLD CREATININE-BSD FMLA CKD-EPI: 88 ML/MIN/1.73 M 2
GLOBULIN SER CALC-MCNC: 2.2 G/DL (ref 1.9–3.5)
GLUCOSE SERPL-MCNC: 101 MG/DL (ref 65–99)
POTASSIUM SERPL-SCNC: 4.7 MMOL/L (ref 3.6–5.5)
PROT SERPL-MCNC: 6.5 G/DL (ref 6–8.2)
SODIUM SERPL-SCNC: 139 MMOL/L (ref 135–145)
T4 FREE SERPL-MCNC: 1.04 NG/DL (ref 0.93–1.7)
TSH SERPL-ACNC: 2.09 UIU/ML (ref 0.35–5.5)

## 2024-09-14 PROCEDURE — 36415 COLL VENOUS BLD VENIPUNCTURE: CPT

## 2024-09-14 PROCEDURE — 84439 ASSAY OF FREE THYROXINE: CPT

## 2024-09-14 PROCEDURE — 80053 COMPREHEN METABOLIC PANEL: CPT

## 2024-09-14 PROCEDURE — 84443 ASSAY THYROID STIM HORMONE: CPT

## 2024-10-10 RX ORDER — LAMOTRIGINE 150 MG/1
150 TABLET ORAL DAILY
Qty: 30 TABLET | Refills: 6 | Status: SHIPPED | OUTPATIENT
Start: 2024-10-10 | End: 2024-10-30

## 2024-10-10 RX ORDER — LITHIUM CARBONATE 600 MG/1
600 CAPSULE ORAL DAILY
Qty: 90 CAPSULE | Refills: 6 | Status: SHIPPED | OUTPATIENT
Start: 2024-10-10

## 2024-10-21 ENCOUNTER — OFFICE VISIT (OUTPATIENT)
Dept: URGENT CARE | Facility: PHYSICIAN GROUP | Age: 29
End: 2024-10-21
Payer: COMMERCIAL

## 2024-10-21 VITALS
OXYGEN SATURATION: 98 % | BODY MASS INDEX: 25.69 KG/M2 | SYSTOLIC BLOOD PRESSURE: 110 MMHG | HEIGHT: 63 IN | WEIGHT: 145 LBS | TEMPERATURE: 97.9 F | DIASTOLIC BLOOD PRESSURE: 76 MMHG | HEART RATE: 68 BPM | RESPIRATION RATE: 16 BRPM

## 2024-10-21 DIAGNOSIS — J01.90 ACUTE BACTERIAL SINUSITIS: ICD-10-CM

## 2024-10-21 DIAGNOSIS — B96.89 ACUTE BACTERIAL SINUSITIS: ICD-10-CM

## 2024-10-21 DIAGNOSIS — R05.9 COUGH, UNSPECIFIED TYPE: ICD-10-CM

## 2024-10-21 PROCEDURE — 3074F SYST BP LT 130 MM HG: CPT | Performed by: NURSE PRACTITIONER

## 2024-10-21 PROCEDURE — 99213 OFFICE O/P EST LOW 20 MIN: CPT | Performed by: NURSE PRACTITIONER

## 2024-10-21 PROCEDURE — 3078F DIAST BP <80 MM HG: CPT | Performed by: NURSE PRACTITIONER

## 2024-10-21 RX ORDER — METHYLPREDNISOLONE 4 MG/1
TABLET ORAL
Qty: 21 TABLET | Refills: 0 | Status: SHIPPED | OUTPATIENT
Start: 2024-10-21

## 2024-10-21 RX ORDER — BENZONATATE 200 MG/1
200 CAPSULE ORAL 3 TIMES DAILY PRN
Qty: 60 CAPSULE | Refills: 0 | Status: SHIPPED | OUTPATIENT
Start: 2024-10-21

## 2024-10-21 RX ORDER — AMITRIPTYLINE HYDROCHLORIDE 100 MG/1
TABLET ORAL
COMMUNITY
Start: 2024-10-16

## 2024-10-21 ASSESSMENT — ENCOUNTER SYMPTOMS
EYE DISCHARGE: 0
SHORTNESS OF BREATH: 0
DIARRHEA: 0
CHILLS: 0
SINUS PAIN: 1
WHEEZING: 0
HEADACHES: 1
ORTHOPNEA: 0
COUGH: 1
MYALGIAS: 1
FEVER: 0
SPUTUM PRODUCTION: 1
SORE THROAT: 1
NAUSEA: 0

## 2024-10-21 ASSESSMENT — FIBROSIS 4 INDEX: FIB4 SCORE: 0.25

## 2024-10-30 ENCOUNTER — APPOINTMENT (OUTPATIENT)
Dept: BEHAVIORAL HEALTH | Facility: CLINIC | Age: 29
End: 2024-10-30
Payer: COMMERCIAL

## 2024-10-30 DIAGNOSIS — F90.9 ATTENTION DEFICIT HYPERACTIVITY DISORDER (ADHD), UNSPECIFIED ADHD TYPE: ICD-10-CM

## 2024-10-30 RX ORDER — HYDROXYZINE HYDROCHLORIDE 25 MG/1
25 TABLET, FILM COATED ORAL 3 TIMES DAILY
Qty: 270 TABLET | Refills: 0 | Status: SHIPPED | OUTPATIENT
Start: 2024-10-30 | End: 2025-01-28

## 2024-10-30 RX ORDER — LAMOTRIGINE 25 MG/1
TABLET ORAL
Qty: 84 TABLET | Refills: 0 | Status: SHIPPED | OUTPATIENT
Start: 2024-10-30 | End: 2024-11-27

## 2024-10-30 RX ORDER — LISDEXAMFETAMINE DIMESYLATE 30 MG/1
30 CAPSULE ORAL EVERY MORNING
Qty: 30 CAPSULE | Refills: 0 | Status: SHIPPED | OUTPATIENT
Start: 2024-10-30 | End: 2024-11-29

## 2024-11-12 DIAGNOSIS — F90.9 ATTENTION DEFICIT HYPERACTIVITY DISORDER (ADHD), UNSPECIFIED ADHD TYPE: ICD-10-CM

## 2024-11-12 RX ORDER — LISDEXAMFETAMINE DIMESYLATE 40 MG/1
40 CAPSULE ORAL DAILY
Qty: 30 CAPSULE | Refills: 0 | Status: SHIPPED | OUTPATIENT
Start: 2024-11-12 | End: 2024-12-12

## 2024-11-25 ENCOUNTER — APPOINTMENT (OUTPATIENT)
Dept: NEUROLOGY | Facility: MEDICAL CENTER | Age: 29
End: 2024-11-25
Attending: PSYCHIATRY & NEUROLOGY
Payer: COMMERCIAL

## 2024-12-17 ENCOUNTER — OFFICE VISIT (OUTPATIENT)
Dept: BEHAVIORAL HEALTH | Facility: CLINIC | Age: 29
End: 2024-12-17
Payer: COMMERCIAL

## 2024-12-17 DIAGNOSIS — F31.81 BIPOLAR 2 DISORDER (HCC): ICD-10-CM

## 2024-12-17 DIAGNOSIS — F90.9 ATTENTION DEFICIT HYPERACTIVITY DISORDER (ADHD), UNSPECIFIED ADHD TYPE: ICD-10-CM

## 2024-12-17 PROCEDURE — 99214 OFFICE O/P EST MOD 30 MIN: CPT | Mod: GC

## 2024-12-17 RX ORDER — LAMOTRIGINE 25 MG/1
TABLET ORAL
Qty: 42 TABLET | Refills: 0 | Status: SHIPPED | OUTPATIENT
Start: 2024-12-17 | End: 2025-01-14

## 2024-12-17 RX ORDER — LAMOTRIGINE 100 MG/1
100 TABLET ORAL DAILY
Qty: 90 TABLET | Refills: 0 | Status: SHIPPED | OUTPATIENT
Start: 2024-12-17

## 2024-12-17 NOTE — PROGRESS NOTES
Psychiatric Follow Up Note  Evaluation completed by: Desirae Olson M.D.   Date of Service: 12/17/2024  Appointment type: in-office appointment.  Attending:  Fernie Parsons MD  Information below was collected from: patient    CHIEF COMPLIANT:  bipolar disorder type 2 and ADHD    HPI:   Brenda Westbrook is a 29 y.o. old female who presents today for regularly scheduled follow up for assessment of bipolar disorder type 2 and ADHD. Patient last seen on 10/30 when plan was to continue lithium ER 600mg daily, slowly taper and discontinue lamotrigine 150mg daily, and continue Vyvanse 40mg daily. Patient reports that since discontinuation of lamotrigine she has had difficulty sleeping for 4-5 nights in a week, sleeps for 3 hours per night. She has tried hydroxyzine and  trazodone from previous prescription to sleep but both were ineffective. She reports that she reports that she was up for 36 hours the week before Thanksgiving in mid November.     She reports in the first week of December she had symptoms of hypomania for 10 days including decreased need for sleep (waking up at 5am), elevated mood, cleaning all day, studying all day. She is very aware when she feels like she is having symptoms of hypomania. She then crashes and becomes depressed and irritable afterwards.     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 Denies depression, denies overwhelming anxiety. Continues to do well in school with lisdexamfetamine 40mg daily, she is currently taking drug holiday.       PSYCHIATRIC REVIEW OF SYSTEMS:current symptoms as reported by pt.  Depression: Denies depressed mood or anhedonia  Awilda: Irritability, Elevated mood, Racing thoughts, Increased goal-directed activity, and Decreased need for sleep  Anxiety/Panic Attacks: Denies any anxiety associated symptoms  Trauma: Patient reports no signs or symptoms indicative of PTSD  Psychosis: Patient reports no signs or symptoms indicative of psychosis  ADHD:  fails to give close attention to details or makes careless mistakes in school, has difficulty sustaining attention in tasks or play activities, is often forgetful in daily activities, avoids engaging in tasks that require sustained attention    CURRENT MEDICATIONS    Current Outpatient Medications:     lamoTRIgine (LAMICTAL) 25 MG Tab, Take 1 Tablet by mouth every day for 14 days, THEN 2 Tablets every day for 14 days., Disp: 42 Tablet, Rfl: 0    lamoTRIgine (LAMICTAL) 100 MG Tab, Take 1 Tablet by mouth every day., Disp: 90 Tablet, Rfl: 0    hydrOXYzine HCl (ATARAX) 25 MG Tab, Take 1 Tablet by mouth 3 times a day for 90 days., Disp: 270 Tablet, Rfl: 0    amitriptyline (ELAVIL) 100 MG Tab, , Disp: , Rfl:     methylPREDNISolone (MEDROL DOSEPAK) 4 MG Tablet Therapy Pack, Follow schedule on package instructions., Disp: 21 Tablet, Rfl: 0    benzonatate (TESSALON) 200 MG capsule, Take 1 Capsule by mouth 3 times a day as needed for Cough., Disp: 60 Capsule, Rfl: 0    lithium 600 MG capsule, Take 1 Capsule by mouth every day., Disp: 90 Capsule, Rfl: 6    eletriptan (RELPAX) 40 MG tablet, 1 tab at headache onset; repeat in 1 hour prn (Patient not taking: Reported on 7/15/2024), Disp: 9 Tablet, Rfl: 4    betamethasone, augmented, (DIPROLENE) 0.05 % gel, as needed. (Patient not taking: Reported on 7/15/2024), Disp: , Rfl:     albuterol (PROVENTIL) 2.5 mg/0.5 mL Nebu Soln, Take  by nebulization one time. (Patient not taking: Reported on 7/15/2024), Disp: , Rfl:     budesonide-formoterol (SYMBICORT) 80-4.5 MCG/ACT Aerosol, Inhale 2 Puffs 2 times a day. (Patient not taking: Reported on 7/15/2024), Disp: , Rfl:      REVIEW OF SYSTEMS   ROS  Neurologic: no tics, tremors, dyskinesias. The patient denies dizzniess, syncope, falls. Ambulates independently    PAST MEDICAL HISTORY  Past Medical History:   Diagnosis Date    Asthma, exercise induced     Bronchitis     Chlamydia     Oct 2011    Cold     02/6/15    Endometriosis     Head  ache     Heart burn     Indigestion     Intractable migraine without aura and without status migrainosus 04/19/2017    Referral to neuro    Other specified symptom associated with female genital organs     endometriosis    Pain     pelvic    Pain 08/23/2012    stomach    Sleep apnea     tested twice - one pos, one neg    Sleep disturbances     Unspecified disorder of thyroid 2015    hyperactive in the past not taking any meds currently    Urinary bladder disorder     frequent uti's     Allergies   Allergen Reactions    Ciprofloxacin Vomiting    Codeine Hives     Tolerates oxycodone and hydromorphone    Doxycycline Itching and Vomiting    Keflex Itching    Morphine Unspecified and Hives     Patient reports could not breath.      Past Surgical History:   Procedure Laterality Date    [place] CYSTOURETHROSCOPY N/A 4/12/2023    Procedure: CYSTOSCOPY;  Surgeon: Everardo Lara M.D.;  Location: [facility];  Service: Gen Robotic    [place] TRANSPOSITION, OVARY(S) Bilateral 4/12/2023    Procedure: OOPHOROPEXY;  Surgeon: Everardo Lara M.D.;  Location: [facility];  Service: Gen Robotic    [place] LAP,FULGURATE/EXCISE LESIONS N/A 4/12/2023    Procedure: FULGURATION, ENDOMETRIOSIS, LAPAROSCOPIC;  Surgeon: Everardo Lara M.D.;  Location: [facility];  Service: Gen Robotic    HYSTERECTOMY ROBOTIC XI N/A 4/12/2023    Procedure: ROBOTICALLY ASSISTED TOTAL LAPAROSCOPIC HYSTERECTOMY;  Surgeon: Everardo Lara M.D.;  Location: [facility];  Service: Gen Robotic    OVARIAN CYSTECTOMY Left 4/12/2023    Procedure: LEFT OVARIAN CYSTECTOMY;  Surgeon: Everardo Lara M.D.;  Location: [facility];  Service: Gen Robotic    PB KNEE SCOPE,MED/LAT MENISECTOMY Right 11/16/2022    Procedure: RIGHT KNEE ARTHROSCOPY, RIGHT LATERAL MENISCECTOMY AND HARDWARE REMOVAL REPAIRS AS INDICATED;  Surgeon: Rafat Alva M.D.;  Location: [facility];  Service: Orthopedics    PB KNEE SCOPE,MED/LAT MENISECTOMY  Right 3/14/2022    Procedure: RIGHT KNEE ARTHROSCOPY, CHONDROPLASTY;  Surgeon: Rafat Alva M.D.;  Location: [facility];  Service: Orthopedics    MENISCAL REPAIR Right 3/14/2022    Procedure: REPAIR, MENISCUS, KNEE-lateral;  Surgeon: Rafat Alva M.D.;  Location: [facility];  Service: Orthopedics    REPEAT C SECTOIN WITH SALPINGECTOMY Bilateral 2021    Procedure:  SECTION, REPEAT, WITH SALPINGECTOMY;  Surgeon: Jorge Alvarenga M.D.;  [facility];  Service: Labor and Delivery    REPEAT C SECTION N/A 9/15/2018    Procedure: REPEAT C SECTION;  Surgeon: Rachna Patterson M.D.;  Location: [facility];  Service: Labor and Delivery    PRIMARY C SECTION Bilateral 2016    Procedure: PRIMARY C SECTION;  Surgeon: Trever Beckman M.D.;  Location: [facility];  Service:     MANNY BY LAPAROSCOPY  3/12/2015    Procedure: MANNY BY LAPAROSCOPY;  Surgeon: Sean Mitchell M.D.;  Location: SURGERY [facility];  Service:     PELVISCOPY  2013    CYSTOSCOPY  2012    Performed by QUINTIN LOPES at SURGERY [facility]    PELVISCOPY  2012    Performed by CHRISTIANO PUGH at SURGERY SAME DAY [facility]    MYRINGOTOMY      STRABISMUS REPAIR      gerardo eyes    TONSILLECTOMY        Family History   Problem Relation Age of Onset    Other Mother         Migraine    Other Brother         Migraine    Diabetes Other     Hypertension Other     Stroke Other     Cancer Other     Heart Disease Other      Social History     Socioeconomic History    Marital status:     Highest education level: 12th grade   Tobacco Use    Smoking status: Never     Passive exposure: Never    Smokeless tobacco: Never   Vaping Use    Vaping status: Never Used   Substance and Sexual Activity    Alcohol use: Not Currently     Comment: occasionally - 2 times per month    Drug use: No    Sexual activity: Yes     Partners: Male      Birth control/protection: Condom, Female Sterilization   Social History Narrative    ** Merged History Encounter **          Social Drivers of Health     Financial Resource Strain: Low Risk  (2/4/2021)    Overall Financial Resource Strain (CARDIA)     Difficulty of Paying Living Expenses: Not very hard   Food Insecurity: No Food Insecurity (2/4/2021)    Hunger Vital Sign     Worried About Running Out of Food in the Last Year: Never true     Ran Out of Food in the Last Year: Never true   Transportation Needs: No Transportation Needs (2/4/2021)    PRAPARE - Transportation     Lack of Transportation (Medical): No     Lack of Transportation (Non-Medical): No   Physical Activity: Inactive (2/4/2021)    Exercise Vital Sign     Days of Exercise per Week: 0 days     Minutes of Exercise per Session: 0 min   Stress: No Stress Concern Present (2/4/2021)    [place] [employer] - Occupational Stress Questionnaire     Feeling of Stress : Only a little   Social Connections: Moderately Isolated (2/4/2021)    Social Connection and Isolation Panel [NHANES]     Frequency of Communication with Friends and Family: Three times a week     Frequency of Social Gatherings with Friends and Family: Once a week     Attends Druze Services: Never     Active Member of Clubs or Organizations: No     Attends Club or Organization Meetings: Never     Marital Status:    Housing Stability: Low Risk  (2/4/2021)    Housing Stability Vital Sign     Unable to Pay for Housing in the Last Year: No     Number of Places Lived in the Last Year: 2     Unstable Housing in the Last Year: No     Past Surgical History:   Procedure Laterality Date    [place] CYSTOURETHROSCOPY N/A 4/12/2023    Procedure: CYSTOSCOPY;  Surgeon: Everardo Lara M.D.;  Location: SURGERY [facility];  Service: Gen Robotic    [place] TRANSPOSITION, OVARY(S) Bilateral 4/12/2023    Procedure: OOPHOROPEXY;  Surgeon: Everardo Lara M.D.;  Location: [facility];   Service: Gen Robotic    [place] LAP,FULGURATE/EXCISE LESIONS N/A 2023    Procedure: FULGURATION, ENDOMETRIOSIS, LAPAROSCOPIC;  Surgeon: Everardo Lara M.D.;  Location: [facility];  Service: Gen Robotic    HYSTERECTOMY ROBOTIC XI N/A 2023    Procedure: ROBOTICALLY ASSISTED TOTAL LAPAROSCOPIC HYSTERECTOMY;  Surgeon: Everardo Lara M.D.;  Location: [facility];  Service: Gen Robotic    OVARIAN CYSTECTOMY Left 2023    Procedure: LEFT OVARIAN CYSTECTOMY;  Surgeon: Everardo Lara M.D.;  Location: [facility];  Service: Gen Robotic    PB KNEE SCOPE,MED/LAT MENISECTOMY Right 2022    Procedure: RIGHT KNEE ARTHROSCOPY, RIGHT LATERAL MENISCECTOMY AND HARDWARE REMOVAL REPAIRS AS INDICATED;  Surgeon: Rafat Alva M.D.;  Location: [facility];  Service: Orthopedics    PB KNEE SCOPE,MED/LAT MENISECTOMY Right 3/14/2022    Procedure: RIGHT KNEE ARTHROSCOPY, CHONDROPLASTY;  Surgeon: Rafat Alva M.D.;  Location: [facility];  Service: Orthopedics    MENISCAL REPAIR Right 3/14/2022    Procedure: REPAIR, MENISCUS, KNEE-lateral;  Surgeon: Rafat Alva M.D.;  Location: [facility];  Service: Orthopedics    REPEAT C SECTOIN WITH SALPINGECTOMY Bilateral 2021    Procedure:  SECTION, REPEAT, WITH SALPINGECTOMY;  Surgeon: Jorge Alvarenga M.D.;  Location: [facility];  Service: Labor and Delivery    REPEAT C SECTION N/A 9/15/2018    Procedure: REPEAT C SECTION;  Surgeon: Rachna Patterson M.D.;  Location: [facility];  Service: Labor and Delivery    PRIMARY C SECTION Bilateral 2016    Procedure: PRIMARY C SECTION;  Surgeon: Trever Beckman M.D.;  Location: [facility];  Service:     MANNY BY LAPAROSCOPY  3/12/2015    Procedure: MANNY BY LAPAROSCOPY;  Surgeon: Sean Mitchell M.D.;  [facility];  Service:     PELVISCOPY  2013    CYSTOSCOPY  2012     "Performed by QUINTIN LOPES at SURGERY [facility]    PELVISCOPY  4/19/2012    Performed by CHRISTIANO PUGH at SURGERY [facility] DAY [facility]    MYRINGOTOMY      STRABISMUS REPAIR      gerardo eyes    TONSILLECTOMY         PSYCHIATRIC EXAMINATION   LMP 02/22/2023   Musculoskeletal: No abnormal movements noted  Appearance: well-developed and well-nourished, cooperative and engaged  Thought Process:  linear and coherent  Abnormal or Psychotic Thoughts: No evidence of auditory or visual hallucinations, and no overt delusions noted  Speech: regular rate, rhythm, volume, tone, and syntax and coherent  Mood: \"good\"  Affect: euthymic and congruent with mood  SI/HI: Denies SI and HI  Orientation: alert and oriented  Recent and Remote Memory: no gross impairment in immediate, recent, or remote memory  Attention Span and Concentration: intact  Insight/Judgement into symptoms: good  Neurological Testing (MSSE Score and/or clock drawing): MMSE not performed during this encounter    SCREENINGS:      4/1/2022     1:40 PM 11/20/2023    10:00 AM 1/3/2024     2:00 PM   Depression Screen (PHQ-2/PHQ-9)   PHQ-2 Total Score 0 0 0         1/16/2024     3:21 PM 1/7/2024     9:10 PM    JAMAL-7 ANXIETY SCALE FLOWSHEET   Feeling nervous, anxious, or on edge 1 1   Not being able to stop or control worrying 0 0   Worrying too much about different things 0 0   Trouble relaxing 0 0   Being so restless that it is hard to sit still 0 0   Becoming easily annoyed or irritable 1 1   Feeling afraid as if something awful might happen 0 0   JAMAL-7 Total Score 2 2   How difficult have these problems made it for you to do your work, take care of things at home, or get along with other people? Not difficult at all Not difficult at all       [place]  records   reviewed.  No concerns about misuse of controlled substance.    CURRENT RISK ASSESSMENT       Suicide: Low       Homicide: Low       Self-Harm: Low       Relapse: Low       Crisis Safety " Plan Reviewed Not Indicated    ASSESSMENT/DIAGNOSES/PLAN   Brenda Westbrook is a 29F with history of bipolar disorder type 2 and ADHD. Patient last seen on 10/30 when plan was to continue Lithium ER 600mg daily and slowly taper and discontinue lamotrigine 150mg daily to reduce risk of polypharmacy. Patient reports that she has not been able to sleep since discontinuation of medication, averaging 3 hours of sleep 4-5 nights per week. In the first week of December she had breakthrough symptoms of hypomania, decreased need for sleep for 10 days with elevated mood, goal-oriented activity of cleaning and studying all day. Patient has tried Lithium ER 900mg daily and it caused too much sedation, she was not able to stay up all day past 2pm in the afternoon. Discussed risks and benefits of restarting lamotrigine as adjunct therapy for mood stabilization due to good efficacy at reducing betzy episodes from March to December with Lithium. Will slowly taper and increase lamotrigine, starting at 25mg daily for 14 days, increase to 50mg daily for 14 days and continue lamotrigine 100mg daily. She was previously at 150mg daily, but we will try lowest possible dose. Discussed risks of SJS with titration, monitor for symptoms of painful rash around mouth and face. Will follow up during Fall break when patient is back in [place], around 12 weeks.  Problem List Items Addressed This Visit          Psychiatry Problems    Bipolar 2 disorder (HCC)     Other Visit Diagnoses       Attention deficit hyperactivity disorder (ADHD), unspecified ADHD type            -Continue Lithium ER 600mg daily  -Restart lamotrigine 100mg daily (25mg for 14 days, 50mg for 14 days, then increase to 100mg daily)  -Continue lisdexamfetamine 40mg daily        Medication options, alternatives (including no medications) and medication risks/benefits/side effects were discussed in detail.  The patient was advised to call, message clinician on [employer], or come in to  the clinic if symptoms worsen or if questions/issues regarding their medications arise.  The patient verbalized understanding and agreement.    The patient was educated to call 911, call the suicide hotline, or go to the local ER if having thoughts of suicide or homicide.  The patient verbalized understanding and agreement.   The proposed treatment plan was discussed with the patient who was provided the opportunity to ask questions and make suggestions regarding alternative treatment. Patient verbalized understanding and expressed agreement with the plan.      No follow-ups on file.      This appointment was supervised by attending psychiatrist, Fernie Parsons MD who agrees with assessment and treatment plan.  See attending attestation for more details.

## 2025-01-03 ENCOUNTER — HOSPITAL ENCOUNTER (EMERGENCY)
Facility: MEDICAL CENTER | Age: 30
End: 2025-01-03
Attending: EMERGENCY MEDICINE
Payer: COMMERCIAL

## 2025-01-03 ENCOUNTER — APPOINTMENT (OUTPATIENT)
Dept: RADIOLOGY | Facility: MEDICAL CENTER | Age: 30
End: 2025-01-03
Attending: EMERGENCY MEDICINE
Payer: COMMERCIAL

## 2025-01-03 VITALS
HEART RATE: 72 BPM | RESPIRATION RATE: 17 BRPM | TEMPERATURE: 97 F | OXYGEN SATURATION: 96 % | WEIGHT: 160.27 LBS | DIASTOLIC BLOOD PRESSURE: 67 MMHG | HEIGHT: 63 IN | BODY MASS INDEX: 28.4 KG/M2 | SYSTOLIC BLOOD PRESSURE: 104 MMHG

## 2025-01-03 DIAGNOSIS — N12 PYELONEPHRITIS: ICD-10-CM

## 2025-01-03 DIAGNOSIS — R10.9 FLANK PAIN: ICD-10-CM

## 2025-01-03 DIAGNOSIS — N30.91 HEMORRHAGIC CYSTITIS: ICD-10-CM

## 2025-01-03 LAB
ALBUMIN SERPL BCP-MCNC: 4.5 G/DL (ref 3.2–4.9)
ALBUMIN/GLOB SERPL: 1.7 G/DL
ALP SERPL-CCNC: 70 U/L (ref 30–99)
ALT SERPL-CCNC: 10 U/L (ref 2–50)
ANION GAP SERPL CALC-SCNC: 10 MMOL/L (ref 7–16)
APPEARANCE UR: ABNORMAL
AST SERPL-CCNC: 17 U/L (ref 12–45)
BACTERIA #/AREA URNS HPF: ABNORMAL /HPF
BASOPHILS # BLD AUTO: 0.5 % (ref 0–1.8)
BASOPHILS # BLD: 0.06 K/UL (ref 0–0.12)
BILIRUB SERPL-MCNC: 0.3 MG/DL (ref 0.1–1.5)
BILIRUB UR QL STRIP.AUTO: NEGATIVE
BUN SERPL-MCNC: 11 MG/DL (ref 8–22)
CALCIUM ALBUM COR SERPL-MCNC: 9.4 MG/DL (ref 8.5–10.5)
CALCIUM SERPL-MCNC: 9.8 MG/DL (ref 8.5–10.5)
CASTS URNS QL MICRO: ABNORMAL /LPF (ref 0–2)
CHLORIDE SERPL-SCNC: 105 MMOL/L (ref 96–112)
CO2 SERPL-SCNC: 25 MMOL/L (ref 20–33)
COLOR UR: ABNORMAL
CREAT SERPL-MCNC: 1.19 MG/DL (ref 0.5–1.4)
EOSINOPHIL # BLD AUTO: 0.23 K/UL (ref 0–0.51)
EOSINOPHIL NFR BLD: 1.9 % (ref 0–6.9)
EPITHELIAL CELLS 1715: ABNORMAL /HPF (ref 0–5)
ERYTHROCYTE [DISTWIDTH] IN BLOOD BY AUTOMATED COUNT: 45.1 FL (ref 35.9–50)
GFR SERPLBLD CREATININE-BSD FMLA CKD-EPI: 63 ML/MIN/1.73 M 2
GLOBULIN SER CALC-MCNC: 2.6 G/DL (ref 1.9–3.5)
GLUCOSE SERPL-MCNC: 104 MG/DL (ref 65–99)
GLUCOSE UR STRIP.AUTO-MCNC: NEGATIVE MG/DL
HCG SERPL QL: NEGATIVE
HCT VFR BLD AUTO: 41.9 % (ref 37–47)
HGB BLD-MCNC: 13.3 G/DL (ref 12–16)
HYALINE CAST   1831: PRESENT /LPF
IMM GRANULOCYTES # BLD AUTO: 0.04 K/UL (ref 0–0.11)
IMM GRANULOCYTES NFR BLD AUTO: 0.3 % (ref 0–0.9)
KETONES UR STRIP.AUTO-MCNC: ABNORMAL MG/DL
LEUKOCYTE ESTERASE UR QL STRIP.AUTO: ABNORMAL
LIPASE SERPL-CCNC: 33 U/L (ref 11–82)
LYMPHOCYTES # BLD AUTO: 2.14 K/UL (ref 1–4.8)
LYMPHOCYTES NFR BLD: 18.1 % (ref 22–41)
MCH RBC QN AUTO: 28.8 PG (ref 27–33)
MCHC RBC AUTO-ENTMCNC: 31.7 G/DL (ref 32.2–35.5)
MCV RBC AUTO: 90.7 FL (ref 81.4–97.8)
MICRO URNS: ABNORMAL
MONOCYTES # BLD AUTO: 0.6 K/UL (ref 0–0.85)
MONOCYTES NFR BLD AUTO: 5.1 % (ref 0–13.4)
NEUTROPHILS # BLD AUTO: 8.77 K/UL (ref 1.82–7.42)
NEUTROPHILS NFR BLD: 74.1 % (ref 44–72)
NITRITE UR QL STRIP.AUTO: NEGATIVE
NRBC # BLD AUTO: 0 K/UL
NRBC BLD-RTO: 0 /100 WBC (ref 0–0.2)
PH UR STRIP.AUTO: 7 [PH] (ref 5–8)
PLATELET # BLD AUTO: 347 K/UL (ref 164–446)
PMV BLD AUTO: 8.8 FL (ref 9–12.9)
POTASSIUM SERPL-SCNC: 4.3 MMOL/L (ref 3.6–5.5)
PROT SERPL-MCNC: 7.1 G/DL (ref 6–8.2)
PROT UR QL STRIP: 100 MG/DL
RBC # BLD AUTO: 4.62 M/UL (ref 4.2–5.4)
RBC # URNS HPF: ABNORMAL /HPF (ref 0–2)
RBC UR QL AUTO: ABNORMAL
SODIUM SERPL-SCNC: 140 MMOL/L (ref 135–145)
SP GR UR STRIP.AUTO: 1.02
TRANS CELLS URNS QL MICRO: PRESENT /HPF
UROBILINOGEN UR STRIP.AUTO-MCNC: 0.2 EU/DL
WBC # BLD AUTO: 11.8 K/UL (ref 4.8–10.8)
WBC #/AREA URNS HPF: >100 /HPF

## 2025-01-03 PROCEDURE — 96374 THER/PROPH/DIAG INJ IV PUSH: CPT

## 2025-01-03 PROCEDURE — 85025 COMPLETE CBC W/AUTO DIFF WBC: CPT

## 2025-01-03 PROCEDURE — 700111 HCHG RX REV CODE 636 W/ 250 OVERRIDE (IP): Mod: JZ | Performed by: EMERGENCY MEDICINE

## 2025-01-03 PROCEDURE — 81001 URINALYSIS AUTO W/SCOPE: CPT

## 2025-01-03 PROCEDURE — 36415 COLL VENOUS BLD VENIPUNCTURE: CPT

## 2025-01-03 PROCEDURE — 74176 CT ABD & PELVIS W/O CONTRAST: CPT

## 2025-01-03 PROCEDURE — 99284 EMERGENCY DEPT VISIT MOD MDM: CPT

## 2025-01-03 PROCEDURE — 700105 HCHG RX REV CODE 258: Performed by: EMERGENCY MEDICINE

## 2025-01-03 PROCEDURE — 80053 COMPREHEN METABOLIC PANEL: CPT

## 2025-01-03 PROCEDURE — 83690 ASSAY OF LIPASE: CPT

## 2025-01-03 PROCEDURE — 96375 TX/PRO/DX INJ NEW DRUG ADDON: CPT

## 2025-01-03 PROCEDURE — 84703 CHORIONIC GONADOTROPIN ASSAY: CPT

## 2025-01-03 RX ORDER — KETOROLAC TROMETHAMINE 15 MG/ML
15 INJECTION, SOLUTION INTRAMUSCULAR; INTRAVENOUS ONCE
Status: COMPLETED | OUTPATIENT
Start: 2025-01-03 | End: 2025-01-03

## 2025-01-03 RX ORDER — ONDANSETRON 2 MG/ML
4 INJECTION INTRAMUSCULAR; INTRAVENOUS ONCE
Status: COMPLETED | OUTPATIENT
Start: 2025-01-03 | End: 2025-01-03

## 2025-01-03 RX ORDER — SULFAMETHOXAZOLE AND TRIMETHOPRIM 800; 160 MG/1; MG/1
1 TABLET ORAL 2 TIMES DAILY
Qty: 14 TABLET | Refills: 0 | Status: ACTIVE | OUTPATIENT
Start: 2025-01-03 | End: 2025-01-09

## 2025-01-03 RX ORDER — SODIUM CHLORIDE 9 MG/ML
1000 INJECTION, SOLUTION INTRAVENOUS ONCE
Status: COMPLETED | OUTPATIENT
Start: 2025-01-03 | End: 2025-01-03

## 2025-01-03 RX ORDER — CEFTRIAXONE 2 G/1
2000 INJECTION, POWDER, FOR SOLUTION INTRAMUSCULAR; INTRAVENOUS ONCE
Status: COMPLETED | OUTPATIENT
Start: 2025-01-03 | End: 2025-01-03

## 2025-01-03 RX ADMIN — KETOROLAC TROMETHAMINE 15 MG: 15 INJECTION, SOLUTION INTRAMUSCULAR; INTRAVENOUS at 09:42

## 2025-01-03 RX ADMIN — ONDANSETRON 4 MG: 2 INJECTION INTRAMUSCULAR; INTRAVENOUS at 10:52

## 2025-01-03 RX ADMIN — FENTANYL CITRATE 50 MCG: 50 INJECTION, SOLUTION INTRAMUSCULAR; INTRAVENOUS at 10:52

## 2025-01-03 RX ADMIN — SODIUM CHLORIDE 1000 ML: 9 INJECTION, SOLUTION INTRAVENOUS at 09:36

## 2025-01-03 RX ADMIN — CEFTRIAXONE SODIUM 2000 MG: 2 INJECTION, POWDER, FOR SOLUTION INTRAMUSCULAR; INTRAVENOUS at 13:32

## 2025-01-03 ASSESSMENT — PAIN DESCRIPTION - PAIN TYPE
TYPE: ACUTE PAIN

## 2025-01-03 ASSESSMENT — FIBROSIS 4 INDEX: FIB4 SCORE: 0.25

## 2025-01-03 NOTE — ED NOTES
Patient ambulatory back to ortho 1 with  a steady gait. Labs drawn in triage. She has urine cup and was encouraged to pee when she can. She is changing into gown, chart up for ERP. Visitor at bedside, call bell within reach

## 2025-01-03 NOTE — ED TRIAGE NOTES
Chief Complaint   Patient presents with    Blood in Urine     Began this morning. Pt states she is concerned she has a kidney infection    Flank Pain     Left sided    Nausea     Pt ambulates to triage for above.     Provided urine cup for sample, protocol initiated, pt returned to lobby.

## 2025-01-03 NOTE — ED PROVIDER NOTES
ED Provider Note    CHIEF COMPLAINT  Chief Complaint   Patient presents with    Blood in Urine     Began this morning. Pt states she is concerned she has a kidney infection    Flank Pain     Left sided    Nausea       EXTERNAL RECORDS REVIEWED  Patient encounter from 12/17/2024 the patient was seen for outpatient follow-up meant of bipolar disorder and ADHD.  They discussed medication changes,    HPI/ROS  LIMITATION TO HISTORY   Select: : None  OUTSIDE HISTORIAN(S):  Significant other at bedside    Brenda Westbrook is a 29 y.o. female who presents the emergency room for concerns regarding hematuria and left-sided flank discomfort.  The patient states that over the last week she has not been having urinary frequency or urgency, she is noting that this started on the left side, occasionally radiates lower, was noted with isolated hematuria with no vaginal bleeding, vaginal discharge or rectal pain or rectal bleeding.  She has had repeat urinary tract infection several years ago that eventually did spread to her kidney and was treated with antibiotics alone.  She says the pain was similar in distribution is similar but she does feel that this is somewhat less intense in that event.  She has not been having urinary frequency or burning sensations at this time.  She denies any fevers or chills on initial assessment her blood pressure is stable and she is having a very subtle tachycardia    PAST MEDICAL HISTORY   has a past medical history of Asthma, exercise induced, Bronchitis, Chlamydia, Cold, Endometriosis, Head ache, Heart burn, Indigestion, Intractable migraine without aura and without status migrainosus (04/19/2017), Other specified symptom associated with female genital organs, Pain, Pain (08/23/2012), Sleep apnea, Sleep disturbances, Unspecified disorder of thyroid (2015), and Urinary bladder disorder.    SURGICAL HISTORY   has a past surgical history that includes tonsillectomy; strabismus repair;  myringotomy; cystoscopy (8/30/2012); pelviscopy (4/19/2012); pelviscopy (2013); joshua by laparoscopy (3/12/2015); primary c section (Bilateral, 6/11/2016); repeat c section (N/A, 9/15/2018); repeat c sectoin with salpingectomy (Bilateral, 5/9/2021); knee scope,med/lat menisectomy (Right, 3/14/2022); meniscal repair (Right, 3/14/2022); knee scope,med/lat menisectomy (Right, 11/16/2022); cystourethroscopy (N/A, 4/12/2023); transposition, ovary(s) (Bilateral, 4/12/2023); lap,fulgurate/excise lesions (N/A, 4/12/2023); hysterectomy robotic xi (N/A, 4/12/2023); and ovarian cystectomy (Left, 4/12/2023).    FAMILY HISTORY  Family History   Problem Relation Age of Onset    Other Mother         Migraine    Other Brother         Migraine    Diabetes Other     Hypertension Other     Stroke Other     Cancer Other     Heart Disease Other      SOCIAL HISTORY  Social History     Tobacco Use    Smoking status: Never     Passive exposure: Never    Smokeless tobacco: Never   Vaping Use    Vaping status: Never Used   Substance and Sexual Activity    Alcohol use: Not Currently     Comment: occasionally - 2 times per month    Drug use: No    Sexual activity: Yes     Partners: Male     Birth control/protection: Condom, Female Sterilization     CURRENT MEDICATIONS  Home Medications       Reviewed by Belkis Cruz R.N. (Registered Nurse) on 01/03/25 at 0823  Med List Status: Partial     Medication Last Dose Status   albuterol (PROVENTIL) 2.5 mg/0.5 mL Nebu Soln  Active   amitriptyline (ELAVIL) 100 MG Tab  Active   benzonatate (TESSALON) 200 MG capsule  Active   betamethasone, augmented, (DIPROLENE) 0.05 % gel  Active   budesonide-formoterol (SYMBICORT) 80-4.5 MCG/ACT Aerosol  Active   eletriptan (RELPAX) 40 MG tablet  Active   hydrOXYzine HCl (ATARAX) 25 MG Tab  Active   lamoTRIgine (LAMICTAL) 100 MG Tab  Active   lamoTRIgine (LAMICTAL) 25 MG Tab  Active   lithium 600 MG capsule  Active   methylPREDNISolone (MEDROL DOSEPAK) 4 MG Tablet  "Therapy Pack  Active                  Audit from Redirected Encounters    **Home medications have not yet been reviewed for this encounter**       ALLERGIES  Allergies   Allergen Reactions    Ciprofloxacin Vomiting    Codeine Hives     Tolerates oxycodone and hydromorphone    Doxycycline Itching and Vomiting    Keflex Itching    Morphine Unspecified and Hives     Patient reports could not breath.      PHYSICAL EXAM  VITAL SIGNS: /64   Pulse 81   Temp 35.8 °C (96.5 °F) (Temporal)   Resp 16   Ht 1.6 m (5' 3\")   Wt 72.7 kg (160 lb 4.4 oz)   LMP 02/22/2023   SpO2 95%   BMI 28.39 kg/m²    Genl: F sitting in gurney comfortably, speaking clearly, appears in very mild distress   Head: NC/AT   ENT: Mucous membranes moist, posterior pharynx clear, uvula midline, nares patent bilaterally   Pulmonary: Lungs are clear to auscultation bilaterally  Chest: No TTP  CV:  Tachycardia (99), no murmur appreciated  Abdomen: soft, no upper abdominal discomfort, no lower abdominal discomfort, left-sided flank discomfort without rebound or guarding. ND; no rebound/guarding, no masses palpated, no HSM   : no CVA or suprapubic tenderness   Musculoskeletal: Pain free ROM of the neck. Moving upper and lower extremities in spontaneous and coordinated fashion  Neuro: A&Ox4 (person, place, time, situation), speech fluent, gait steady, no focal deficits appreciated  Skin: No rash or lesions.  No pallor or jaundice.  No cyanosis.  Warm and dry.     EKG/LABS  Labs Reviewed   CBC WITH DIFFERENTIAL - Abnormal; Notable for the following components:       Result Value    WBC 11.8 (*)     MCHC 31.7 (*)     MPV 8.8 (*)     Neutrophils-Polys 74.10 (*)     Lymphocytes 18.10 (*)     Neutrophils (Absolute) 8.77 (*)     All other components within normal limits   COMP METABOLIC PANEL - Abnormal; Notable for the following components:    Glucose 104 (*)     All other components within normal limits   URINALYSIS - Abnormal; Notable for the " following components:    Character Cloudy (*)     Ketones Trace (*)     Protein 100 (*)     Leukocyte Esterase Moderate (*)     Occult Blood Moderate (*)     All other components within normal limits   URINE MICROSCOPIC (W/UA) - Abnormal; Notable for the following components:    WBC >100 (*)     RBC  (*)     Bacteria Few (*)     Trans Epithelial Cells Present (*)     All other components within normal limits   LIPASE   ESTIMATED GFR   HCG QUAL SERUM     RADIOLOGY/PROCEDURES   I have independently interpreted the diagnostic imaging associated with this visit and am waiting the final reading from the radiologist.   My preliminary interpretation is as follows: No ureteral stones, no structural changes to be kidneys, no obvious inflammatory evidence in the abdomen pelvis  Radiologist interpretation:  CT-RENAL COLIC EVALUATION(A/P W/O)   Final Result      1. No urinary tract calculus identified. No renal collecting system dilatation.      2. No evidence of inflammatory change in the abdomen or pelvis. The study is limited due to nonuse of intravenous contrast.      3. Absent gallbladder.        COURSE & MEDICAL DECISION MAKING    ASSESSMENT, COURSE AND PLAN  Care Narrative: Evaluated for symptoms as described above.  On initial assessment she had some lability with initial tachycardia but is afebrile and hemodynamically stable.  She has left-sided flank discomfort with hematuria that point towards likely nephrolithiasis that cannot exclude hemorrhagic cystitis.  She has no vaginal or rectal symptoms, she has had a hysterectomy, she is any significant history of ovarian cysts and I would doubt that this is ovarian in nature.    Lab work shows scant leukocytosis 11.8, leftward shift but no bandemia.  There is no endorgan dysfunction or LFT changes, no TOBY and her pregnancy test is negative.  She has bacteria, blood and greater than 100 WBCs with some leukocyte esterase.  This could be consistent for hemorrhagic  cystitis but cannot fully exclude the possibility of infection in the setting of stone.  She has nonspecific flank pain she does not have true CVA tenderness and she is very fluid responsive.  He feels improved, she will be given a dose of Rocephin here for likely Harjinder versus hemorrhagic cystitis.  We have discussed antibiotics in the past and I do believe she has had an adverse reaction to Keflex and after discussion with pharmacy I do believe she can tolerate a dose of Rocephin here.  Plan will be for initiation of 10 days of Bactrim.  Will follow the cultures, she will return to the emergency department for breakthrough fevers, chills or generally not improving within the next 48 hours.    Hydration: Based on the patient's presentation of Dehydration and Tachycardia the patient was given IV fluids. IV Hydration was used because oral hydration was not adequate alone. Upon recheck following hydration, the patient was improving.    DISPOSITION AND DISCUSSIONS  I have discussed management of the patient with the following physicians and MARC's:  none    Discussion of management with other QHP or appropriate source(s): Pharmacy regarding Rx choices      Escalation of care considered, and ultimately not performed:acute inpatient care management, however at this time, the patient is most appropriate for outpatient management    Barriers to care at this time, including but not limited to: none.     Decision tools and prescription drugs considered including, but not limited to: Antibiotics bactrim .    FINAL DIAGNOSIS  1. Flank pain    2. Hemorrhagic cystitis    3. Pyelonephritis      Electronically signed by: Glenn Hernandez M.D., 1/3/2025 8:49 AM

## 2025-01-07 DIAGNOSIS — F90.9 ATTENTION DEFICIT HYPERACTIVITY DISORDER (ADHD), UNSPECIFIED ADHD TYPE: ICD-10-CM

## 2025-01-07 RX ORDER — LISDEXAMFETAMINE DIMESYLATE 40 MG/1
40 CAPSULE ORAL DAILY
Qty: 30 CAPSULE | Refills: 0 | Status: SHIPPED | OUTPATIENT
Start: 2025-03-10 | End: 2025-01-07

## 2025-01-07 RX ORDER — LISDEXAMFETAMINE DIMESYLATE 40 MG/1
40 CAPSULE ORAL DAILY
Qty: 30 CAPSULE | Refills: 0 | Status: SHIPPED | OUTPATIENT
Start: 2025-01-07 | End: 2025-02-06

## 2025-01-07 RX ORDER — LISDEXAMFETAMINE DIMESYLATE 40 MG/1
40 CAPSULE ORAL DAILY
Qty: 30 CAPSULE | Refills: 0 | Status: SHIPPED | OUTPATIENT
Start: 2025-02-07 | End: 2025-01-07

## 2025-01-07 RX ORDER — LISDEXAMFETAMINE DIMESYLATE 40 MG/1
40 CAPSULE ORAL DAILY
Qty: 30 CAPSULE | Refills: 0 | Status: SHIPPED | OUTPATIENT
Start: 2025-01-07 | End: 2025-01-07

## 2025-01-07 RX ORDER — LISDEXAMFETAMINE DIMESYLATE 40 MG/1
40 CAPSULE ORAL DAILY
Qty: 30 CAPSULE | Refills: 0 | Status: SHIPPED | OUTPATIENT
Start: 2025-02-07 | End: 2025-03-09

## 2025-01-07 RX ORDER — LISDEXAMFETAMINE DIMESYLATE 40 MG/1
40 CAPSULE ORAL DAILY
Qty: 30 CAPSULE | Refills: 0 | Status: SHIPPED | OUTPATIENT
Start: 2025-03-10 | End: 2025-04-09

## 2025-03-11 ENCOUNTER — APPOINTMENT (OUTPATIENT)
Dept: NEUROLOGY | Facility: MEDICAL CENTER | Age: 30
End: 2025-03-11
Attending: PSYCHIATRY & NEUROLOGY
Payer: COMMERCIAL

## 2025-03-12 ENCOUNTER — OFFICE VISIT (OUTPATIENT)
Dept: BEHAVIORAL HEALTH | Facility: CLINIC | Age: 30
End: 2025-03-12
Payer: COMMERCIAL

## 2025-03-12 DIAGNOSIS — F90.9 ATTENTION DEFICIT HYPERACTIVITY DISORDER (ADHD), UNSPECIFIED ADHD TYPE: ICD-10-CM

## 2025-03-12 DIAGNOSIS — G47.00 INSOMNIA, UNSPECIFIED TYPE: ICD-10-CM

## 2025-03-12 DIAGNOSIS — F31.81 BIPOLAR 2 DISORDER (HCC): ICD-10-CM

## 2025-03-12 PROCEDURE — 99214 OFFICE O/P EST MOD 30 MIN: CPT | Mod: GC

## 2025-03-12 RX ORDER — LITHIUM CARBONATE 450 MG
900 TABLET, EXTENDED RELEASE ORAL EVERY EVENING
Qty: 120 TABLET | Refills: 0 | Status: SHIPPED | OUTPATIENT
Start: 2025-03-12 | End: 2025-05-11

## 2025-03-12 RX ORDER — LITHIUM CARBONATE 450 MG
900 TABLET, EXTENDED RELEASE ORAL EVERY EVENING
Qty: 120 TABLET | Refills: 0 | Status: SHIPPED | OUTPATIENT
Start: 2025-03-12 | End: 2025-03-12 | Stop reason: SDUPTHER

## 2025-03-12 RX ORDER — LISDEXAMFETAMINE DIMESYLATE 40 MG/1
40 CAPSULE ORAL DAILY
Qty: 30 CAPSULE | Refills: 0 | Status: SHIPPED | OUTPATIENT
Start: 2025-03-12 | End: 2025-03-12

## 2025-03-12 RX ORDER — LISDEXAMFETAMINE DIMESYLATE 40 MG/1
40 CAPSULE ORAL DAILY
Qty: 30 CAPSULE | Refills: 0 | Status: SHIPPED | OUTPATIENT
Start: 2025-03-12 | End: 2025-04-11

## 2025-03-12 NOTE — PROGRESS NOTES
Psychiatric Follow Up Note  Evaluation completed by: Desirae Olson M.D.   Date of Service: 2025  Appointment type: in-office appointment.  Attending:  Fernie Parsons MD  Information below was collected from: patient    CHIEF COMPLIANT:  bipolar disorder type 2, ADHD    HPI:   Brenda Westbrook is a 30 y.o. old female who presents today for regularly scheduled follow up for assessment of bipolar disorder type 2 and ADHD. Patient last seen on  when plan was to continue Lithium ER 600mg, restart lamotrigine 100mg and continue Vyvanse 40mg daily. 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 The wife of the client overheard their conversation and later that day committed suicide and killed their 3-year-old child. Patient subsequently experienced a period of severe depression for around 3 weeks after this event, feeling guilt and decreased sleep, decreased appetite, did not go to class or see friends. She established with a therapist at her law school, friends in [place] are very supportive and are holding her accountable to not drink alcohol, eat and socialize. Patient reports that she was drinking alcohol heavily during this period of depression, reports drinking for 4 days in a row during one weekend. Patient reports that her last alcoholic beverage was .     Patient reports her primary concern at this time is having periods without sleep for 36 hours, denies feeling too anxious or activated from her stimulant medication. She takes drug holidays on weekends and does not effect her insomnia. She has documented the dates in the past 6 months where she has stayed up over 36 hours, denies associated hypomania symptoms at this time:     Nights up for 36+ hours   - grandpa , lamotrigine discontinued   - home for  - home for winter break   - In  [place], school studying  March 4 - In [place]    Patient reports taking Lithium ER, lamotrigine 100mg, and amitryptline 100mg (for migraines) at bedtime.     PSYCHIATRIC REVIEW OF SYSTEMS:current symptoms as reported by pt.  Depression: Depressed mood, Difficulty sleeping, Low energy, and Low appetite  Awilda: Decreased need for sleep  Anxiety/Panic Attacks: Denies any anxiety associated symptoms  Trauma: Patient reports no signs or symptoms indicative of PTSD  Psychosis: Patient reports no signs or symptoms indicative of psychosis  ADHD: fails to give close attention to details or makes careless mistakes in school    CURRENT MEDICATIONS    Current Outpatient Medications:     Lisdexamfetamine Dimesylate 40 MG Cap, Take 1 Capsule by mouth every day for 30 days., Disp: 30 Capsule, Rfl: 0    lithium carbonate  MG Tab CR tablet, Take 2 Tablets by mouth every evening for 60 days., Disp: 120 Tablet, Rfl: 0    lamoTRIgine (LAMICTAL) 100 MG Tab, Take 1 Tablet by mouth every day., Disp: 90 Tablet, Rfl: 0    amitriptyline (ELAVIL) 100 MG Tab, , Disp: , Rfl:     methylPREDNISolone (MEDROL DOSEPAK) 4 MG Tablet Therapy Pack, Follow schedule on package instructions., Disp: 21 Tablet, Rfl: 0    benzonatate (TESSALON) 200 MG capsule, Take 1 Capsule by mouth 3 times a day as needed for Cough., Disp: 60 Capsule, Rfl: 0    eletriptan (RELPAX) 40 MG tablet, 1 tab at headache onset; repeat in 1 hour prn (Patient not taking: Reported on 7/15/2024), Disp: 9 Tablet, Rfl: 4    betamethasone, augmented, (DIPROLENE) 0.05 % gel, as needed. (Patient not taking: Reported on 7/15/2024), Disp: , Rfl:     albuterol (PROVENTIL) 2.5 mg/0.5 mL Nebu Soln, Take  by nebulization one time. (Patient not taking: Reported on 7/15/2024), Disp: , Rfl:     budesonide-formoterol (SYMBICORT) 80-4.5 MCG/ACT Aerosol, Inhale 2 Puffs 2 times a day. (Patient not taking: Reported on 7/15/2024), Disp: , Rfl:      REVIEW OF SYSTEMS   ROS  Neurologic: no tics, tremors,  dyskinesias. The patient denies dizzniess, syncope, falls. Ambulates independently    PAST MEDICAL HISTORY  Past Medical History:   Diagnosis Date    Asthma, exercise induced     Bronchitis     Chlamydia     Oct 2011    Cold     02/6/15    Endometriosis     Head ache     Heart burn     Indigestion     Intractable migraine without aura and without status migrainosus 04/19/2017    Referral to neuro    Other specified symptom associated with female genital organs     endometriosis    Pain     pelvic    Pain 08/23/2012    stomach    Sleep apnea     tested twice - one pos, one neg    Sleep disturbances     Unspecified disorder of thyroid 2015    hyperactive in the past not taking any meds currently    Urinary bladder disorder     frequent uti's     Allergies   Allergen Reactions    Ciprofloxacin Vomiting    Codeine Hives     Tolerates oxycodone and hydromorphone    Doxycycline Itching and Vomiting    Keflex Itching    Morphine Unspecified and Hives     Patient reports could not breath.      Past Surgical History:   Procedure Laterality Date    MN CYSTOURETHROSCOPY N/A 4/12/2023    Procedure: CYSTOSCOPY;  Surgeon: Everardo Lara M.D.;  Location: [facility];  Service: Gen Robotic    MN TRANSPOSITION, OVARY(S) Bilateral 4/12/2023    Procedure: OOPHOROPEXY;  Surgeon: Everardo Lara M.D.;  Location: [facility];  Service: Gen Robotic    MN LAP,FULGURATE/EXCISE LESIONS N/A 4/12/2023    Procedure: FULGURATION, ENDOMETRIOSIS, LAPAROSCOPIC;  Surgeon: Everardo Lara M.D.;  Location: [facility];  Service: Gen Robotic    HYSTERECTOMY ROBOTIC XI N/A 4/12/2023    Procedure: ROBOTICALLY ASSISTED TOTAL LAPAROSCOPIC HYSTERECTOMY;  Surgeon: Everardo Lara M.D.;  Location: [facility];  Service: Gen Robotic    OVARIAN CYSTECTOMY Left 4/12/2023    Procedure: LEFT OVARIAN CYSTECTOMY;  Surgeon: Everardo Lara M.D.;  Location: [facility];  Service: Gen Robotic    PB KNEE SCOPE,MED/LAT MENISECTOMY  Right 2022    Procedure: RIGHT KNEE ARTHROSCOPY, RIGHT LATERAL MENISCECTOMY AND HARDWARE REMOVAL REPAIRS AS INDICATED;  Surgeon: Rafat Alva M.D.;  Location: [facility];  Service: Orthopedics    PB KNEE SCOPE,MED/LAT MENISECTOMY Right 3/14/2022    Procedure: RIGHT KNEE ARTHROSCOPY, CHONDROPLASTY;  Surgeon: Rafat Alva M.D.;  Location: [facility];  Service: Orthopedics    MENISCAL REPAIR Right 3/14/2022    Procedure: REPAIR, MENISCUS, KNEE-lateral;  Surgeon: Rafat Alva M.D.;  Location: [facility];  Service: Orthopedics    REPEAT C SECTOIN WITH SALPINGECTOMY Bilateral 2021    Procedure:  SECTION, REPEAT, WITH SALPINGECTOMY;  Surgeon: Jorge Alvarenga M.D.;  [facility];  Service: Labor and Delivery    REPEAT C SECTION N/A 9/15/2018    Procedure: REPEAT C SECTION;  Surgeon: Rachna Patterson M.D.;  [facility];  Service: Labor and Delivery    PRIMARY C SECTION Bilateral 2016    Procedure: PRIMARY C SECTION;  Surgeon: Trever Beckman M.D.;  Location: [facility];  Service:     MANNY BY LAPAROSCOPY  3/12/2015    Procedure: MANNY BY LAPAROSCOPY;  Surgeon: Sean Mitchell M.D.;  Location: [facility];  Service:     PELVISCOPY      CYSTOSCOPY  2012    Performed by QUINTIN LOPES at SURGERY [facility]    PELVISCOPY  2012    Performed by CHRISTIANO PUGH at SURGERY SAME DAY [facility]    MYRINGOTOMY      STRABISMUS REPAIR      gerardo eyes    TONSILLECTOMY        Family History   Problem Relation Age of Onset    Other Mother         Migraine    Other Brother         Migraine    Diabetes Other     Hypertension Other     Stroke Other     Cancer Other     Heart Disease Other      Social History     Socioeconomic History    Marital status:     Highest education level: 12th grade   Tobacco Use    Smoking status: Never     Passive  exposure: Never    Smokeless tobacco: Never   Vaping Use    Vaping status: Never Used   Substance and Sexual Activity    Alcohol use: Not Currently     Comment: occasionally - 2 times per month    Drug use: No    Sexual activity: Yes     Partners: Male     Birth control/protection: Condom, Female Sterilization   Social History Narrative    ** Merged History Encounter **          Social Drivers of Health     Financial Resource Strain: Not on File (2024)    Received from [facility]    Financial Resource Strain     Financial Resource Strain: 0   Food Insecurity: Not on File (2024)    Received from [facility]    Food Insecurity     Food: 0   Transportation Needs: Not on File (2024)    Received from [facility]    Transportation Needs     Transportation: 0   Physical Activity: Not on File (2024)    Received from [facility]    Physical Activity     Physical Activity: 0   Stress: Not on File (2024)    Received from [facility]    Stress     Stress: 0   Social Connections: Not on File (2024)    Received from [facility]    Social Connections     Connectedness: 0   Housing Stability: Not on File (2024)    Received from [facility]    Housing Stability     Housin     Past Surgical History:   Procedure Laterality Date    RI CYSTOURETHROSCOPY N/A 2023    Procedure: CYSTOSCOPY;  Surgeon: Everardo Lara M.D.;  Location: SURGERY [facility];  Service: Gen Robotic    RI TRANSPOSITION, OVARY(S) Bilateral 2023    Procedure: OOPHOROPEXY;  Surgeon: Everardo Lara M.D.;  Location: SURGERY [facility];  Service: Gen Robotic    RI LAP,FULGURATE/EXCISE LESIONS N/A 2023    Procedure: FULGURATION, ENDOMETRIOSIS, LAPAROSCOPIC;  Surgeon: Everardo Lara M.D.;  Location: SURGERY [facility];  Service: Gen Robotic    HYSTERECTOMY ROBOTIC XI N/A 2023    Procedure: ROBOTICALLY ASSISTED TOTAL LAPAROSCOPIC HYSTERECTOMY;  Surgeon: Everardo Lara M.D.;  Location: SURGERY [facility];  Service: Gen Robotic    OVARIAN CYSTECTOMY  Left 2023    Procedure: LEFT OVARIAN CYSTECTOMY;  Surgeon: Everardo Lara M.D.;  Location: SURGERY [facility];  Service: Gen Robotic    PB KNEE SCOPE,MED/LAT MENISECTOMY Right 2022    Procedure: RIGHT KNEE ARTHROSCOPY, RIGHT LATERAL MENISCECTOMY AND HARDWARE REMOVAL REPAIRS AS INDICATED;  Surgeon: Rafat Alva M.D.;  Location: [facility];  Service: Orthopedics    PB KNEE SCOPE,MED/LAT MENISECTOMY Right 3/14/2022    Procedure: RIGHT KNEE ARTHROSCOPY, CHONDROPLASTY;  Surgeon: Rafat Alva M.D.;  Location: [facility];  Service: Orthopedics    MENISCAL REPAIR Right 3/14/2022    Procedure: REPAIR, MENISCUS, KNEE-lateral;  Surgeon: Rafat Alva M.D.;  Location: [facility];  Service: Orthopedics    REPEAT C SECTOIN WITH SALPINGECTOMY Bilateral 2021    Procedure:  SECTION, REPEAT, WITH SALPINGECTOMY;  Surgeon: Jorge Alvarenga M.D.;  [facility]: [facility];  Service: Labor and Delivery    REPEAT C SECTION N/A 9/15/2018    Procedure: REPEAT C SECTION;  Surgeon: Rachna Patterson M.D.;  Location: [facility];  Service: Labor and Delivery    PRIMARY C SECTION Bilateral 2016    Procedure: PRIMARY C SECTION;  Surgeon: Trever Beckman M.D.;  Location: [facility];  Service:     MANNY BY LAPAROSCOPY  3/12/2015    Procedure: MANNY BY LAPAROSCOPY;  Surgeon: Sean Mitchell M.D.;  Location: SUR[facility]ERY [facility];  Service:     PELVISCOPY  2013    CYSTOSCOPY  2012    Performed by QUINTIN LOPES at SURGERY [facility]    PELVISCOPY  2012    Performed by CHRISTIANO PUGH at SURGERY SAME DAY [facility]    MYRINGOTOMY      STRABISMUS REPAIR      gerardo eyes    TONSILLECTOMY         PSYCHIATRIC EXAMINATION   LMP 2023   Musculoskeletal: No abnormal movements noted and wnl  Appearance: well-developed and well-nourished, cooperative and engaged  Thought Process:  linear and  "non-linear  Abnormal or Psychotic Thoughts: No evidence of auditory or visual hallucinations, and no overt delusions noted  Speech: regular rate, rhythm, volume, tone, and syntax  Mood: \"depressed\"  Affect: euthymic  SI/HI: Denies SI and HI  Orientation: alert and oriented  Recent and Remote Memory: no gross impairment in immediate, recent, or remote memory  Attention Span and Concentration:  Insight/Judgement into symptoms: good  Neurological Testing (MSSE Score and/or clock drawing): MMSE not performed during this encounter     SCREENINGS:      4/1/2022     1:40 PM 11/20/2023    10:00 AM 1/3/2024     2:00 PM   Depression Screen (PHQ-2/PHQ-9)   PHQ-2 Total Score 0 0 0         1/16/2024     3:21 PM 1/7/2024     9:10 PM    JAMAL-7 ANXIETY SCALE FLOWSHEET   Feeling nervous, anxious, or on edge 1 1   Not being able to stop or control worrying 0 0   Worrying too much about different things 0 0   Trouble relaxing 0 0   Being so restless that it is hard to sit still 0 0   Becoming easily annoyed or irritable 1 1   Feeling afraid as if something awful might happen 0 0   JAMAL-7 Total Score 2 2   How difficult have these problems made it for you to do your work, take care of things at home, or get along with other people? Not difficult at all Not difficult at all       [place]  records   reviewed.  No concerns about misuse of controlled substance.    CURRENT RISK ASSESSMENT       Suicide: Low       Homicide: Low       Self-Harm: Low       Relapse: Low       Crisis Safety Plan Reviewed Not Indicated    ASSESSMENT/DIAGNOSES/PLAN  Brenda Westbrook is a 30F with history of bipolar disorder type II, ADHD, insomnia who returns for follow-up for medication management. Patient last seen on 12/17 when plan was to continue Lithium ER 600mg qHS and restart lamotrigine 100mg qHS due to good benefit in mood. 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 Patient reports severe depressive episode lasting around 3-4 weeks, decreased appetite, decreased sleep, feelings of guilt, was not attending school or socializing. Denies any suicidal ideation at this time or currently. Patient reports that she established weekly therapy in [place] which has been helpful. She has felt better since the event and reports good social support from friends in [place], friends keeping her accountable to go to glass, eat food, not drink alcohol. Patient reports that she had a period of time where she was drinking increasingly (up to 4 days in a row one weekend). Currently she reports she is struggling with sleep, approximately every month for the past 6 weeks she has a period of not sleeping for 36 hours, denies any associated hypomania symptoms at this time. Due to the context of her acute event, depressed mood and history of bipolar disorder, will increase Lithium ER from 600mg to 900mg at this time for an acute period of time in order to further regulate sleep, mood. She has previously taken Lithium (IR) with 300mg in the morning which made her too tired to focus in school during the day, will monitor for symptoms but will plan to administer the entire dose of 900mg at bedtime. Plan to order metabolic labs (CMP, A1c, Lipid, Lithium trough) during next appointment when she is back in [place] after the semester is over. She takes amitriptyline 100 mg qHS for migraines which is effective most nights at helping her feel tired and sleeping. Patient denies any interference of Vyvanse with her sleep or anxiety, she takes drug holidays over the weekend. Offered supportive therapy to patient in office today that he event was out of her control and not her fault. Will follow up in 6-8 weeks.   Problem List Items Addressed This Visit          Psychiatry Problems    Bipolar 2 disorder (HCC)       Other    Insomnia     Other Visit  Diagnoses         Attention deficit hyperactivity disorder (ADHD), unspecified ADHD type        Relevant Medications    Lisdexamfetamine Dimesylate 40 MG Cap         -Increase Lithium ER from 600mg to 900mg  -Continue lamotrigine 100mg qHS  -Continue lisdexamfetamine 40mg daily   -Continue amitriptyline 100mg qHS (migraines)    Medication options, alternatives (including no medications) and medication risks/benefits/side effects were discussed in detail.  The patient was advised to call, message clinician on [facility], or come in to the clinic if symptoms worsen or if questions/issues regarding their medications arise.  The patient verbalized understanding and agreement.    The patient was educated to call 911, call the suicide hotline, or go to the local ER if having thoughts of suicide or homicide.  The patient verbalized understanding and agreement.   The proposed treatment plan was discussed with the patient who was provided the opportunity to ask questions and make suggestions regarding alternative treatment. Patient verbalized understanding and expressed agreement with the plan.      Follow up in 6-8 weeks    This appointment was supervised by attending psychiatrist, Fernie Parsons MD who agrees with assessment and treatment plan.  See attending attestation for more details.

## 2025-03-13 ENCOUNTER — APPOINTMENT (OUTPATIENT)
Dept: NEUROLOGY | Facility: MEDICAL CENTER | Age: 30
End: 2025-03-13
Attending: PSYCHIATRY & NEUROLOGY
Payer: COMMERCIAL

## 2025-04-18 RX ORDER — LAMOTRIGINE 100 MG/1
100 TABLET ORAL DAILY
Qty: 90 TABLET | Refills: 0 | Status: SHIPPED | OUTPATIENT
Start: 2025-04-18

## 2025-04-18 RX ORDER — LITHIUM CARBONATE 450 MG
900 TABLET, EXTENDED RELEASE ORAL EVERY EVENING
Qty: 120 TABLET | Refills: 0 | Status: SHIPPED | OUTPATIENT
Start: 2025-04-18 | End: 2025-04-18

## 2025-04-18 RX ORDER — LITHIUM CARBONATE 450 MG
900 TABLET, EXTENDED RELEASE ORAL EVERY EVENING
Qty: 180 TABLET | Refills: 0 | Status: SHIPPED | OUTPATIENT
Start: 2025-04-18 | End: 2025-07-17

## 2025-04-23 ENCOUNTER — APPOINTMENT (OUTPATIENT)
Dept: BEHAVIORAL HEALTH | Facility: CLINIC | Age: 30
End: 2025-04-23
Payer: COMMERCIAL

## 2025-04-24 ENCOUNTER — PHARMACY VISIT (OUTPATIENT)
Dept: PHARMACY | Facility: MEDICAL CENTER | Age: 30
End: 2025-04-24
Payer: COMMERCIAL

## 2025-04-24 ENCOUNTER — OFFICE VISIT (OUTPATIENT)
Dept: NEUROLOGY | Facility: MEDICAL CENTER | Age: 30
End: 2025-04-24
Attending: PSYCHIATRY & NEUROLOGY
Payer: COMMERCIAL

## 2025-04-24 VITALS
OXYGEN SATURATION: 100 % | HEART RATE: 63 BPM | SYSTOLIC BLOOD PRESSURE: 98 MMHG | BODY MASS INDEX: 25.29 KG/M2 | DIASTOLIC BLOOD PRESSURE: 68 MMHG | RESPIRATION RATE: 16 BRPM | HEIGHT: 64 IN | TEMPERATURE: 98.1 F | WEIGHT: 148.15 LBS

## 2025-04-24 DIAGNOSIS — G43.009 MIGRAINE WITHOUT AURA AND WITHOUT STATUS MIGRAINOSUS, NOT INTRACTABLE: ICD-10-CM

## 2025-04-24 PROCEDURE — 99213 OFFICE O/P EST LOW 20 MIN: CPT | Performed by: PSYCHIATRY & NEUROLOGY

## 2025-04-24 PROCEDURE — RXMED WILLOW AMBULATORY MEDICATION CHARGE: Performed by: PSYCHIATRY & NEUROLOGY

## 2025-04-24 PROCEDURE — 3078F DIAST BP <80 MM HG: CPT | Performed by: PSYCHIATRY & NEUROLOGY

## 2025-04-24 PROCEDURE — 3074F SYST BP LT 130 MM HG: CPT | Performed by: PSYCHIATRY & NEUROLOGY

## 2025-04-24 PROCEDURE — 99211 OFF/OP EST MAY X REQ PHY/QHP: CPT | Performed by: PSYCHIATRY & NEUROLOGY

## 2025-04-24 RX ORDER — HYDROXYZINE HYDROCHLORIDE 25 MG/1
25 TABLET, FILM COATED ORAL PRN
COMMUNITY
Start: 2024-10-30

## 2025-04-24 RX ORDER — LISDEXAMFETAMINE DIMESYLATE 40 MG/1
40 CAPSULE ORAL PRN
COMMUNITY

## 2025-04-24 RX ORDER — UBROGEPANT 100 MG/1
1 TABLET ORAL PRN
Qty: 6 TABLET | Refills: 0 | Status: SHIPPED | OUTPATIENT
Start: 2025-04-24

## 2025-04-24 ASSESSMENT — ENCOUNTER SYMPTOMS
LOSS OF CONSCIOUSNESS: 0
HEADACHES: 1
DEPRESSION: 0
SEIZURES: 0
CONSTIPATION: 0

## 2025-04-24 ASSESSMENT — PATIENT HEALTH QUESTIONNAIRE - PHQ9
SUM OF ALL RESPONSES TO PHQ QUESTIONS 1-9: 11
CLINICAL INTERPRETATION OF PHQ2 SCORE: 2
5. POOR APPETITE OR OVEREATING: 3 - NEARLY EVERY DAY

## 2025-04-24 ASSESSMENT — FIBROSIS 4 INDEX: FIB4 SCORE: 0.48

## 2025-04-24 NOTE — PROGRESS NOTES
Subjective     Brenda Westbrook is a 30 y.o. female who presents for follow-up, with a history of migraine without aura.     HPI    When last seen, she had actually had a very good benefit on Elavil 50 mg, transition from Tofranil at a similar dose.  We then increase to 100 mg every evening, she has done even better, whereas before she was having 1-2 headaches in a week for treatment, she now gets 2-3 headaches in a month!  Unfortunately eletriptan provided no additional benefit for rescue, not much better than Nurtec ODT.  She has tolerated the Elavil without issue.    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  This to allow her to submit and find states in the [place] without having to sit for bar exams.  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  Her  Doni is working from home, and is caring for the 3 children.    Medical, surgical and family histories are reviewed, there are no new drug allergies.  There have been some changes in her psychotropic regimen since she was last seen, Vyvanse was added late last year, she remains on Lamictal 100 mg daily, lithium is still at 900 mg in the evenings, Cymbalta was discontinued.    She is on Elavil 100 mg nightly, Relpax 40 mg as needed, Lamictal 100 mg daily, Vyvanse 40 mg daily, 5 days a week, and lithium 900 mg every evening.    Review of Systems   Gastrointestinal:  Negative for constipation.   Genitourinary:  Negative for urgency.   Neurological:  Positive for headaches. Negative for seizures and loss of consciousness.   Psychiatric/Behavioral:  Negative for depression and suicidal ideas.    All other systems reviewed and are negative.    Objective     BP 98/68 (BP Location: Left arm, Patient Position: Sitting, BP Cuff Size: Adult)   Pulse 63   Temp 36.7 °C (98.1 °F) (Temporal)    "Resp 16   Ht 1.626 m (5' 4\") Comment: pt reported  Wt 67.2 kg (148 lb 2.4 oz)   LMP 02/22/2023   SpO2 100%   BMI 25.43 kg/m²      Physical Exam    She appears in no acute distress.  Vital signs are stable.  There is no malar rash or jaw claudication.  The neck is supple.  Cardiac evaluation is unremarkable.     Neurological Exam    In quick and cursory fashion, with observation, mental status, cranial nerve, musculoskeletal and coordination evaluations are intact.  Reflex and sensory exams are deferred.  Assessment & Plan  Migraine without aura and without status migrainosus, not intractable  Doing much better, she still has a need for rescue even though the needed self has become minimal.  Ubrelvy 100 mg tablets will be tried, samples are provided.  Initial dosing is taken when the pain begins, she was reassured she can try a second dose within an hour or longer if needed.  Side effects were reviewed.  She will notify the office as to efficacy and tolerability.  It is safe to take with the other medications that she is on.  We will see each other in 6 months or so towards the end of this year.    Orders:    Ubrogepant (UBRELVY) 100 MG Tab; Take 1 Tablet by mouth as needed (1 tab at headache onset, repeat in 2 hour as needed).    Time: 20 minutes in total spent on patient care including.  From charting, record review, discussion with healthcare staff and documentation.  This includes face-to-face time for exam, review, discussion, as well as counseling and coordinating care.            "

## 2025-04-24 NOTE — ASSESSMENT & PLAN NOTE
Doing much better, she still has a need for rescue even though the needed self has become minimal.  Ubrelvy 100 mg tablets will be tried, samples are provided.  Initial dosing is taken when the pain begins, she was reassured she can try a second dose within an hour or longer if needed.  Side effects were reviewed.  She will notify the office as to efficacy and tolerability.  It is safe to take with the other medications that she is on.  We will see each other in 6 months or so towards the end of this year.    Orders:    Ubrogepant (UBRELVY) 100 MG Tab; Take 1 Tablet by mouth as needed (1 tab at headache onset, repeat in 2 hour as needed).

## 2025-05-02 DIAGNOSIS — F90.9 ATTENTION DEFICIT HYPERACTIVITY DISORDER (ADHD), UNSPECIFIED ADHD TYPE: ICD-10-CM

## 2025-05-02 RX ORDER — LISDEXAMFETAMINE DIMESYLATE 40 MG/1
40 CAPSULE ORAL DAILY
Qty: 30 CAPSULE | Refills: 0 | Status: SHIPPED | OUTPATIENT
Start: 2025-05-02 | End: 2025-06-01

## 2025-05-02 RX ORDER — LAMOTRIGINE 100 MG/1
100 TABLET ORAL DAILY
Qty: 90 TABLET | Refills: 0 | Status: SHIPPED | OUTPATIENT
Start: 2025-05-02

## 2025-06-17 DIAGNOSIS — F90.9 ATTENTION DEFICIT HYPERACTIVITY DISORDER (ADHD), UNSPECIFIED ADHD TYPE: ICD-10-CM

## 2025-06-17 RX ORDER — LAMOTRIGINE 100 MG/1
100 TABLET ORAL DAILY
Qty: 90 TABLET | Refills: 0 | Status: CANCELLED | OUTPATIENT
Start: 2025-06-17

## 2025-06-17 NOTE — TELEPHONE ENCOUNTER
GENEVA BRENNER HAS APPT WITH ETHAN ON 8/6    Received request via: Patient    Was the patient seen in the last year in this department? Yes    Does the patient have an active prescription (recently filled or refills available) for medication(s) requested? No    Pharmacy Name: [facility]     Does the patient have residential Plus and need 100-day supply? (This applies to ALL medications) Patient does not have SCP

## 2025-06-17 NOTE — TELEPHONE ENCOUNTER
Received request via: Patient    Was the patient seen in the last year in this department? Yes    Does the patient have an active prescription (recently filled or refills available) for medication(s) requested? No    Pharmacy Name: [facility]    Does the patient have [facility] Plus and need 100-day supply? (This applies to ALL medications) Patient does not have SCP

## 2025-06-18 RX ORDER — LISDEXAMFETAMINE DIMESYLATE 40 MG/1
40 CAPSULE ORAL DAILY
Qty: 30 CAPSULE | Refills: 0 | Status: SHIPPED | OUTPATIENT
Start: 2025-06-18 | End: 2025-07-18

## 2025-06-18 RX ORDER — LAMOTRIGINE 100 MG/1
100 TABLET ORAL DAILY
Qty: 90 TABLET | Refills: 0 | Status: SHIPPED | OUTPATIENT
Start: 2025-06-18

## 2025-06-18 NOTE — TELEPHONE ENCOUNTER
GENEVA PT APPT WITH ETHAN ON 8/6    Received request via: Patient    Was the patient seen in the last year in this department? Yes    Does the patient have an active prescription (recently filled or refills available) for medication(s) requested? No    Pharmacy Name: [facility]     Does the patient have residential Plus and need 100-day supply? (This applies to ALL medications) Patient does not have SCP

## 2025-06-19 RX ORDER — AMITRIPTYLINE HYDROCHLORIDE 100 MG/1
100 TABLET ORAL EVERY EVENING
Qty: 90 TABLET | Refills: 3 | Status: SHIPPED | OUTPATIENT
Start: 2025-06-19

## 2025-06-19 NOTE — TELEPHONE ENCOUNTER
Received request via: Pharmacy    Medication Name/Dosage Amitriptyline 100 mg     When was medication last prescribed 6/17/24    How many refills were previously provided 11    How many Refills does he patient have left from last prescription 0    Was the patient seen in the last year in this department? Yes   Date of last office visit 4/24/25     Per last Neurology Office Visit, when was the date of next follow up visit set for?                          6 mths  Date of office visit follow up request 10/24/25     Does the patient have an upcoming appointment? Yes   If yes, when 12/29/25             If no, schedule appointment     Does the patient have [facility] Plus and need 100 day supply (blood pressure, diabetes and cholesterol meds only)? Medication is not for blood pressure, diabetes or cholesterol
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
## 2025-07-28 ENCOUNTER — OFFICE VISIT (OUTPATIENT)
Dept: BEHAVIORAL HEALTH | Facility: CLINIC | Age: 30
End: 2025-07-28
Payer: COMMERCIAL

## 2025-07-28 DIAGNOSIS — F31.81 BIPOLAR 2 DISORDER (HCC): Primary | ICD-10-CM

## 2025-07-28 DIAGNOSIS — F43.9 TRAUMA AND STRESSOR-RELATED DISORDER: ICD-10-CM

## 2025-07-28 DIAGNOSIS — F90.2 ATTENTION DEFICIT HYPERACTIVITY DISORDER (ADHD), COMBINED TYPE: ICD-10-CM

## 2025-07-28 DIAGNOSIS — G47.00 INSOMNIA, UNSPECIFIED TYPE: ICD-10-CM

## 2025-07-28 PROCEDURE — 90792 PSYCH DIAG EVAL W/MED SRVCS: CPT | Performed by: STUDENT IN AN ORGANIZED HEALTH CARE EDUCATION/TRAINING PROGRAM

## 2025-07-28 RX ORDER — HYDROXYZINE HYDROCHLORIDE 25 MG/1
25-50 TABLET, FILM COATED ORAL PRN
Qty: 30 TABLET | Refills: 2 | Status: SHIPPED | OUTPATIENT
Start: 2025-07-28

## 2025-07-28 RX ORDER — LITHIUM CARBONATE 300 MG/1
600 TABLET, FILM COATED, EXTENDED RELEASE ORAL NIGHTLY
Qty: 60 TABLET | Refills: 3 | Status: SHIPPED | OUTPATIENT
Start: 2025-07-28

## 2025-07-28 RX ORDER — RAMELTEON 8 MG/1
8 TABLET ORAL NIGHTLY
Qty: 30 TABLET | Refills: 3 | Status: SHIPPED | OUTPATIENT
Start: 2025-07-28 | End: 2025-07-28

## 2025-07-28 RX ORDER — RAMELTEON 8 MG/1
8 TABLET ORAL NIGHTLY PRN
Qty: 30 TABLET | Refills: 3 | Status: SHIPPED | OUTPATIENT
Start: 2025-07-28

## 2025-07-28 RX ORDER — LISDEXAMFETAMINE DIMESYLATE 40 MG/1
40 CAPSULE ORAL EVERY MORNING
Qty: 30 CAPSULE | Refills: 0 | Status: SHIPPED | OUTPATIENT
Start: 2025-08-27 | End: 2025-09-26

## 2025-07-28 RX ORDER — LAMOTRIGINE 100 MG/1
100 TABLET ORAL DAILY
Qty: 90 TABLET | Refills: 1 | Status: SHIPPED | OUTPATIENT
Start: 2025-07-28

## 2025-07-28 RX ORDER — LISDEXAMFETAMINE DIMESYLATE 40 MG/1
40 CAPSULE ORAL EVERY MORNING
Qty: 30 CAPSULE | Refills: 0 | Status: SHIPPED | OUTPATIENT
Start: 2025-07-28 | End: 2025-08-27

## 2025-07-28 NOTE — PATIENT INSTRUCTIONS
- sleep restriction therapy, CBT for insomnia  - if can't fall asleep in 15 min, go outside bedroom in dark room and wait until sleepy.  - write down worries before trying to sleep.

## 2025-08-13 ENCOUNTER — APPOINTMENT (OUTPATIENT)
Dept: RADIOLOGY | Facility: MEDICAL CENTER | Age: 30
End: 2025-08-13
Attending: EMERGENCY MEDICINE
Payer: COMMERCIAL

## 2025-08-13 ENCOUNTER — HOSPITAL ENCOUNTER (EMERGENCY)
Facility: MEDICAL CENTER | Age: 30
End: 2025-08-13
Attending: EMERGENCY MEDICINE
Payer: COMMERCIAL

## 2025-08-13 VITALS
DIASTOLIC BLOOD PRESSURE: 58 MMHG | HEART RATE: 80 BPM | OXYGEN SATURATION: 97 % | SYSTOLIC BLOOD PRESSURE: 108 MMHG | TEMPERATURE: 98.9 F | WEIGHT: 144.84 LBS | RESPIRATION RATE: 16 BRPM | BODY MASS INDEX: 24.73 KG/M2 | HEIGHT: 64 IN

## 2025-08-13 DIAGNOSIS — N12 PYELONEPHRITIS: Primary | ICD-10-CM

## 2025-08-13 LAB
ALBUMIN SERPL BCP-MCNC: 4.5 G/DL (ref 3.2–4.9)
ALBUMIN/GLOB SERPL: 1.7 G/DL
ALP SERPL-CCNC: 61 U/L (ref 30–99)
ALT SERPL-CCNC: 11 U/L (ref 2–50)
ANION GAP SERPL CALC-SCNC: 14 MMOL/L (ref 7–16)
APPEARANCE UR: ABNORMAL
AST SERPL-CCNC: 11 U/L (ref 12–45)
BACTERIA #/AREA URNS HPF: ABNORMAL /HPF
BASOPHILS # BLD AUTO: 0.5 % (ref 0–1.8)
BASOPHILS # BLD: 0.06 K/UL (ref 0–0.12)
BILIRUB SERPL-MCNC: 0.6 MG/DL (ref 0.1–1.5)
BILIRUB UR QL STRIP.AUTO: NEGATIVE
BUN SERPL-MCNC: 13 MG/DL (ref 8–22)
CALCIUM ALBUM COR SERPL-MCNC: 9.7 MG/DL (ref 8.5–10.5)
CALCIUM SERPL-MCNC: 10.1 MG/DL (ref 8.5–10.5)
CASTS URNS QL MICRO: ABNORMAL /LPF (ref 0–2)
CHLORIDE SERPL-SCNC: 107 MMOL/L (ref 96–112)
CO2 SERPL-SCNC: 18 MMOL/L (ref 20–33)
COLOR UR: YELLOW
CREAT SERPL-MCNC: 1.17 MG/DL (ref 0.5–1.4)
EOSINOPHIL # BLD AUTO: 0.08 K/UL (ref 0–0.51)
EOSINOPHIL NFR BLD: 0.7 % (ref 0–6.9)
EPITHELIAL CELLS 1715: ABNORMAL /HPF (ref 0–5)
ERYTHROCYTE [DISTWIDTH] IN BLOOD BY AUTOMATED COUNT: 44.2 FL (ref 35.9–50)
GFR SERPLBLD CREATININE-BSD FMLA CKD-EPI: 64 ML/MIN/1.73 M 2
GLOBULIN SER CALC-MCNC: 2.6 G/DL (ref 1.9–3.5)
GLUCOSE SERPL-MCNC: 85 MG/DL (ref 65–99)
GLUCOSE UR STRIP.AUTO-MCNC: NEGATIVE MG/DL
HCG SERPL QL: NEGATIVE
HCT VFR BLD AUTO: 40.5 % (ref 37–47)
HGB BLD-MCNC: 13.2 G/DL (ref 12–16)
IMM GRANULOCYTES # BLD AUTO: 0.04 K/UL (ref 0–0.11)
IMM GRANULOCYTES NFR BLD AUTO: 0.3 % (ref 0–0.9)
KETONES UR STRIP.AUTO-MCNC: NEGATIVE MG/DL
LEUKOCYTE ESTERASE UR QL STRIP.AUTO: ABNORMAL
LIPASE SERPL-CCNC: 29 U/L (ref 11–82)
LYMPHOCYTES # BLD AUTO: 2.23 K/UL (ref 1–4.8)
LYMPHOCYTES NFR BLD: 19.4 % (ref 22–41)
MCH RBC QN AUTO: 29.5 PG (ref 27–33)
MCHC RBC AUTO-ENTMCNC: 32.6 G/DL (ref 32.2–35.5)
MCV RBC AUTO: 90.6 FL (ref 81.4–97.8)
MICRO URNS: ABNORMAL
MONOCYTES # BLD AUTO: 0.72 K/UL (ref 0–0.85)
MONOCYTES NFR BLD AUTO: 6.3 % (ref 0–13.4)
NEUTROPHILS # BLD AUTO: 8.34 K/UL (ref 1.82–7.42)
NEUTROPHILS NFR BLD: 72.8 % (ref 44–72)
NITRITE UR QL STRIP.AUTO: NEGATIVE
NRBC # BLD AUTO: 0 K/UL
NRBC BLD-RTO: 0 /100 WBC (ref 0–0.2)
PH UR STRIP.AUTO: 7.5 [PH] (ref 5–8)
PLATELET # BLD AUTO: 317 K/UL (ref 164–446)
PMV BLD AUTO: 9.6 FL (ref 9–12.9)
POTASSIUM SERPL-SCNC: 3.7 MMOL/L (ref 3.6–5.5)
PROT SERPL-MCNC: 7.1 G/DL (ref 6–8.2)
PROT UR QL STRIP: 300 MG/DL
RBC # BLD AUTO: 4.47 M/UL (ref 4.2–5.4)
RBC # URNS HPF: >100 /HPF
RBC UR QL AUTO: ABNORMAL
SODIUM SERPL-SCNC: 139 MMOL/L (ref 135–145)
SP GR UR STRIP.AUTO: 1.02
UROBILINOGEN UR STRIP.AUTO-MCNC: 1 EU/DL
WBC # BLD AUTO: 11.5 K/UL (ref 4.8–10.8)
WBC #/AREA URNS HPF: >100 /HPF

## 2025-08-13 PROCEDURE — 81001 URINALYSIS AUTO W/SCOPE: CPT

## 2025-08-13 PROCEDURE — 85025 COMPLETE CBC W/AUTO DIFF WBC: CPT

## 2025-08-13 PROCEDURE — 96374 THER/PROPH/DIAG INJ IV PUSH: CPT

## 2025-08-13 PROCEDURE — 84703 CHORIONIC GONADOTROPIN ASSAY: CPT

## 2025-08-13 PROCEDURE — 99284 EMERGENCY DEPT VISIT MOD MDM: CPT

## 2025-08-13 PROCEDURE — 74177 CT ABD & PELVIS W/CONTRAST: CPT

## 2025-08-13 PROCEDURE — 96376 TX/PRO/DX INJ SAME DRUG ADON: CPT

## 2025-08-13 PROCEDURE — 700105 HCHG RX REV CODE 258: Performed by: EMERGENCY MEDICINE

## 2025-08-13 PROCEDURE — 96375 TX/PRO/DX INJ NEW DRUG ADDON: CPT

## 2025-08-13 PROCEDURE — 80053 COMPREHEN METABOLIC PANEL: CPT

## 2025-08-13 PROCEDURE — 700117 HCHG RX CONTRAST REV CODE 255: Performed by: EMERGENCY MEDICINE

## 2025-08-13 PROCEDURE — 700111 HCHG RX REV CODE 636 W/ 250 OVERRIDE (IP): Mod: JZ | Performed by: EMERGENCY MEDICINE

## 2025-08-13 PROCEDURE — 36415 COLL VENOUS BLD VENIPUNCTURE: CPT

## 2025-08-13 PROCEDURE — 83690 ASSAY OF LIPASE: CPT

## 2025-08-13 RX ORDER — SODIUM CHLORIDE 9 MG/ML
1000 INJECTION, SOLUTION INTRAVENOUS ONCE
Status: COMPLETED | OUTPATIENT
Start: 2025-08-13 | End: 2025-08-13

## 2025-08-13 RX ORDER — HYDROCODONE BITARTRATE AND ACETAMINOPHEN 5; 325 MG/1; MG/1
1-2 TABLET ORAL EVERY 6 HOURS PRN
Qty: 10 TABLET | Refills: 0 | Status: SHIPPED | OUTPATIENT
Start: 2025-08-13 | End: 2025-08-16

## 2025-08-13 RX ORDER — HYDROMORPHONE HYDROCHLORIDE 1 MG/ML
0.5 INJECTION, SOLUTION INTRAMUSCULAR; INTRAVENOUS; SUBCUTANEOUS ONCE
Status: COMPLETED | OUTPATIENT
Start: 2025-08-13 | End: 2025-08-13

## 2025-08-13 RX ORDER — HYDROMORPHONE HYDROCHLORIDE 1 MG/ML
1 INJECTION, SOLUTION INTRAMUSCULAR; INTRAVENOUS; SUBCUTANEOUS ONCE
Status: COMPLETED | OUTPATIENT
Start: 2025-08-13 | End: 2025-08-13

## 2025-08-13 RX ORDER — SULFAMETHOXAZOLE AND TRIMETHOPRIM 800; 160 MG/1; MG/1
1 TABLET ORAL 2 TIMES DAILY
Qty: 20 TABLET | Refills: 0 | Status: ACTIVE | OUTPATIENT
Start: 2025-08-13 | End: 2025-08-23

## 2025-08-13 RX ORDER — KETOROLAC TROMETHAMINE 15 MG/ML
15 INJECTION, SOLUTION INTRAMUSCULAR; INTRAVENOUS ONCE
Status: COMPLETED | OUTPATIENT
Start: 2025-08-13 | End: 2025-08-13

## 2025-08-13 RX ORDER — ONDANSETRON 2 MG/ML
4 INJECTION INTRAMUSCULAR; INTRAVENOUS ONCE
Status: COMPLETED | OUTPATIENT
Start: 2025-08-13 | End: 2025-08-13

## 2025-08-13 RX ORDER — CEFTRIAXONE 2 G/1
2000 INJECTION, POWDER, FOR SOLUTION INTRAMUSCULAR; INTRAVENOUS ONCE
Status: COMPLETED | OUTPATIENT
Start: 2025-08-13 | End: 2025-08-13

## 2025-08-13 RX ORDER — ONDANSETRON 4 MG/1
4 TABLET, ORALLY DISINTEGRATING ORAL EVERY 6 HOURS PRN
Qty: 10 TABLET | Refills: 0 | Status: SHIPPED | OUTPATIENT
Start: 2025-08-13

## 2025-08-13 RX ORDER — LITHIUM CARBONATE 450 MG
450 TABLET, EXTENDED RELEASE ORAL DAILY
COMMUNITY

## 2025-08-13 RX ADMIN — HYDROMORPHONE HYDROCHLORIDE 0.5 MG: 1 INJECTION, SOLUTION INTRAMUSCULAR; INTRAVENOUS; SUBCUTANEOUS at 14:51

## 2025-08-13 RX ADMIN — ONDANSETRON 4 MG: 2 INJECTION INTRAMUSCULAR; INTRAVENOUS at 14:50

## 2025-08-13 RX ADMIN — IOHEXOL 100 ML: 350 INJECTION, SOLUTION INTRAVENOUS at 16:29

## 2025-08-13 RX ADMIN — SODIUM CHLORIDE 1000 ML: 9 INJECTION, SOLUTION INTRAVENOUS at 14:50

## 2025-08-13 RX ADMIN — KETOROLAC TROMETHAMINE 15 MG: 15 INJECTION, SOLUTION INTRAMUSCULAR; INTRAVENOUS at 17:25

## 2025-08-13 RX ADMIN — CEFTRIAXONE SODIUM 2000 MG: 2 INJECTION, POWDER, FOR SOLUTION INTRAMUSCULAR; INTRAVENOUS at 17:24

## 2025-08-13 RX ADMIN — HYDROMORPHONE HYDROCHLORIDE 1 MG: 1 INJECTION, SOLUTION INTRAMUSCULAR; INTRAVENOUS; SUBCUTANEOUS at 17:14

## 2025-08-13 ASSESSMENT — PAIN DESCRIPTION - PAIN TYPE
TYPE: ACUTE PAIN

## 2025-08-13 ASSESSMENT — LIFESTYLE VARIABLES
HOW OFTEN DO YOU HAVE SIX OR MORE DRINKS ON ONE OCCASION: NEVER
HOW MANY STANDARD DRINKS CONTAINING ALCOHOL DO YOU HAVE ON A TYPICAL DAY: 1 OR 2
HOW OFTEN DO YOU HAVE A DRINK CONTAINING ALCOHOL: MONTHLY OR LESS
SKIP TO QUESTIONS 9-10: 1
AUDIT-C TOTAL SCORE: 1

## 2025-08-13 ASSESSMENT — FIBROSIS 4 INDEX: FIB4 SCORE: 0.48

## (undated) DEVICE — TUBING CLEARLINK DUO-VENT - C-FLO (48EA/CA)

## (undated) DEVICE — KIT  I.V. START (100EA/CA)

## (undated) DEVICE — GLOVE BIOGEL SZ 8.5 SURGICAL PF LTX - (50PR/BX 4BX/CA)

## (undated) DEVICE — SLEEVE VASO CALF MED - (10PR/CA)

## (undated) DEVICE — APPLICATOR ENDOSCOPIC SURGICEL (5EA/BX)

## (undated) DEVICE — DETERGENT RENUZYME PLUS 10 OZ PACKET (50/BX)

## (undated) DEVICE — PACK C-SECTION (2EA/CA)

## (undated) DEVICE — BAG SPONGE COUNT 10.25 X 32 - BLUE (250/CA)

## (undated) DEVICE — ELECTRODE DUAL RETURN W/ CORD - (50/PK)

## (undated) DEVICE — SENSOR OXIMETER ADULT SPO2 RD SET (20EA/BX)

## (undated) DEVICE — GLOVE BIOGEL SZ 7 SURGICAL PF LTX - (50PR/BX 4BX/CA)

## (undated) DEVICE — ARMREST CRADLE FOAM - (2PR/PK 12PR/CA)

## (undated) DEVICE — TRAY SPINAL ANESTHESIA NON-SAFETY (10/CA)

## (undated) DEVICE — HEAD HOLDER JUNIOR/ADULT

## (undated) DEVICE — SUTURE 2-0 CHROMIC GUT SH 27 (36PK/BX)"

## (undated) DEVICE — TAPE CLOTH MEDIPORE 6 INCH - (12RL/CA)

## (undated) DEVICE — DRESSING POST OP BORDER 4 X 10 (5EA/BX)

## (undated) DEVICE — OBTURATOR BLADELESS STANDARD 8MM (6EA/BX)

## (undated) DEVICE — DRESSING NON-ADHERING 8 X 3 - (50/BX)

## (undated) DEVICE — SUTURE 3-0 VICRYL PLUS CT-1 - 36 INCH (36/BX)

## (undated) DEVICE — CANISTER SUCTION 3000ML MECHANICAL FILTER AUTO SHUTOFF MEDI-VAC NONSTERILE LF DISP  (40EA/CA)

## (undated) DEVICE — SYRINGE DISP. 12 CC LL - (100/BX)

## (undated) DEVICE — SPONGE GAUZESTER 4 X 4 4PLY - (128PK/CA)

## (undated) DEVICE — SEALER VESSEL EXTEND FROM DAVINCI ENERGY (6EA/BX)

## (undated) DEVICE — UTERINE MANIP RUMI 6.7X8 - (5/BX)

## (undated) DEVICE — WATER IRRIG. STER 3000 ML - (4/CA)

## (undated) DEVICE — GLOVE BIOGEL SZ 6.5 SURGICAL PF LTX (50PR/BX 4BX/CA)

## (undated) DEVICE — SUTURE 0 VICRYL PLUS CT-1 - 36 INCH (36/BX)

## (undated) DEVICE — DRAPE ARM  BOX OF 20

## (undated) DEVICE — PACK GYN DAVINCI (1EA/CA)

## (undated) DEVICE — CATHETER IV NON-SAFETY 18 GA X 1 1/4 (50/BX 4BX/CA)

## (undated) DEVICE — NEEDLE INSUFFLATION FOR STEP - (12/BX)

## (undated) DEVICE — 0 CHROMIC CT-1

## (undated) DEVICE — SEAL 5MM-8MM UNIVERSAL  BOX OF 10

## (undated) DEVICE — UTERINE MANIP RUMI 6.7X6 - (5/BX)

## (undated) DEVICE — COVER FOOT UNIVERSAL DISP. - (25EA/CA)

## (undated) DEVICE — HOOK PERMANENT CAUTERY DA VINCI 10X'S REUSABLE

## (undated) DEVICE — SET EXTENSION WITH 2 PORTS (48EA/CA) ***PART #2C8610 IS A SUBSTITUTE*****

## (undated) DEVICE — SYRINGE 30 ML LL (56/BX)

## (undated) DEVICE — PAD SANITARY 11IN MAXI IND WRAPPED  (12EA/PK 24PK/CA)

## (undated) DEVICE — WATER IRRIG. STER. 1500 ML - (9/CA)

## (undated) DEVICE — TRAY CATHETER FOLEY URINE METER W/STATLOCK 350ML (10EA/CA)

## (undated) DEVICE — SUTURE GENERAL

## (undated) DEVICE — SET LEADWIRE 5 LEAD BEDSIDE DISPOSABLE ECG (1SET OF 5/EA)

## (undated) DEVICE — ELECTROSURGICAL KOH EFFICIENT 3.5CM

## (undated) DEVICE — DEVICE CLOSURE ABSORBABLE SUTURE VLOC TAPER GS 22 12IN (12EA/CT)

## (undated) DEVICE — SET SUCTION/IRRIGATION WITH DISPOSABLE TIP (6/CA )PART #0250-070-520 IS A SUB

## (undated) DEVICE — HEMOSTAT ABSORBABLE POWDER SURGICEL 3G (5EA/BX)

## (undated) DEVICE — CHLORAPREP 26 ML APPLICATOR - ORANGE TINT(25/CA)

## (undated) DEVICE — SODIUM CHL IRRIGATION 0.9% 1000ML (12EA/CA)

## (undated) DEVICE — SUCTION INSTRUMENT YANKAUER BULBOUS TIP W/O VENT (50EA/CA)

## (undated) DEVICE — BIPOLAR FORCE DA VINCI 12X'S REISABLE

## (undated) DEVICE — SCRUB SOLUTION EXIDINE 4% 40Z - 48/CS CHLORAHEXADINE GLUCONATE

## (undated) DEVICE — DRAPE COLUMN  BOX OF 20

## (undated) DEVICE — STAPLER SKIN DISP - (6/BX 10BX/CA) VISISTAT

## (undated) DEVICE — SUTURE 0 VICRYL PLUS CT-2 - 27 INCH (36/BX)

## (undated) DEVICE — WATER IRRIGATION STERILE 1000ML (12EA/CA)

## (undated) DEVICE — GLOVE, BIOGEL ECLIPSE, SZ 7.0, PF LTX (50/BX)

## (undated) DEVICE — SOLUTION PLASMA-LYTE PH 7.4 INJ 1000ML  (14EA/CA)

## (undated) DEVICE — SYSTEM CLEARIFY VISUALIZATION (10EA/PK)

## (undated) DEVICE — TOWEL STOP TIMEOUT SAFETY FLAG (40EA/CA)

## (undated) DEVICE — DERMABOND ADVANCED - (12EA/BX)

## (undated) DEVICE — TRAY SRGPRP PVP IOD WT PRP - (20/CA)

## (undated) DEVICE — SLEEVE, VASO, THIGH, MED

## (undated) DEVICE — SUTURE 1 CHROMIC GUTCT-1 27 (36PK/BX)"

## (undated) DEVICE — ROBOTIC SURGERY SERVICES

## (undated) DEVICE — DRIVER LARGE SUTURECUT NEEDLE (15UN/EA)

## (undated) DEVICE — PACK TRENGUARD 450 PROCEDURE (12EA/CA)

## (undated) DEVICE — GLOVE BIOGEL SZ 8 SURGICAL PF LTX - (50PR/BX 4BX/CA)

## (undated) DEVICE — GOWN WARMING STANDARD FLEX - (30/CA)

## (undated) DEVICE — PAD OR TABLE DA VINCI 2IN X 20IN X 72IN - (12EA/CA)

## (undated) DEVICE — LACTATED RINGERS INJ 1000 ML - (14EA/CA 60CA/PF)

## (undated) DEVICE — BRIEF STRETCH MATERNITY XL - FITS 45-70IN (5EA/BG 20BG/CA)

## (undated) DEVICE — GOWN SURGICAL XX-LARGE - (28EA/CA) SIRUS NON REINFORCED

## (undated) DEVICE — COVER LIGHT HANDLE ALC PLUS DISP (18EA/BX)

## (undated) DEVICE — PACK ROOM TURNOVER L&D (12/CA)

## (undated) DEVICE — JELLY SURGILUBE STERILE TUBE 4.25 OZ (1/EA)

## (undated) DEVICE — LIGASURE SM JAW SEALER CRVD - (6EA/CA)

## (undated) DEVICE — PENCIL ELECTSURG 10FT HLSTR - WITH BLADE (50EA/CA)

## (undated) DEVICE — SUTURE 4-0 MONOCRYL PLUS PS-2 - 27 INCH (36/BX)